# Patient Record
Sex: MALE | Race: WHITE | NOT HISPANIC OR LATINO | URBAN - METROPOLITAN AREA
[De-identification: names, ages, dates, MRNs, and addresses within clinical notes are randomized per-mention and may not be internally consistent; named-entity substitution may affect disease eponyms.]

---

## 2017-12-01 ENCOUNTER — EMERGENCY (EMERGENCY)
Facility: HOSPITAL | Age: 77
LOS: 0 days | Discharge: HOME | End: 2017-12-01

## 2017-12-01 DIAGNOSIS — R11.2 NAUSEA WITH VOMITING, UNSPECIFIED: ICD-10-CM

## 2017-12-01 DIAGNOSIS — R10.11 RIGHT UPPER QUADRANT PAIN: ICD-10-CM

## 2018-03-05 ENCOUNTER — RESULT REVIEW (OUTPATIENT)
Age: 78
End: 2018-03-05

## 2018-03-05 ENCOUNTER — OUTPATIENT (OUTPATIENT)
Dept: OUTPATIENT SERVICES | Facility: HOSPITAL | Age: 78
LOS: 1 days | Discharge: HOME | End: 2018-03-05

## 2018-03-05 VITALS
HEIGHT: 63 IN | RESPIRATION RATE: 20 BRPM | TEMPERATURE: 97 F | DIASTOLIC BLOOD PRESSURE: 73 MMHG | WEIGHT: 115.08 LBS | SYSTOLIC BLOOD PRESSURE: 139 MMHG | HEART RATE: 58 BPM

## 2018-03-05 VITALS — RESPIRATION RATE: 20 BRPM | DIASTOLIC BLOOD PRESSURE: 70 MMHG | HEART RATE: 60 BPM | SYSTOLIC BLOOD PRESSURE: 155 MMHG

## 2018-03-05 DIAGNOSIS — K26.9 DUODENAL ULCER, UNSPECIFIED AS ACUTE OR CHRONIC, WITHOUT HEMORRHAGE OR PERFORATION: ICD-10-CM

## 2018-03-05 DIAGNOSIS — D13.1 BENIGN NEOPLASM OF STOMACH: Chronic | ICD-10-CM

## 2018-03-05 DIAGNOSIS — Z96.611 PRESENCE OF RIGHT ARTIFICIAL SHOULDER JOINT: Chronic | ICD-10-CM

## 2018-03-06 LAB — SURGICAL PATHOLOGY STUDY: SIGNIFICANT CHANGE UP

## 2018-03-09 DIAGNOSIS — R10.13 EPIGASTRIC PAIN: ICD-10-CM

## 2018-03-09 DIAGNOSIS — K29.50 UNSPECIFIED CHRONIC GASTRITIS WITHOUT BLEEDING: ICD-10-CM

## 2018-03-09 DIAGNOSIS — K29.80 DUODENITIS WITHOUT BLEEDING: ICD-10-CM

## 2018-03-09 DIAGNOSIS — K26.9 DUODENAL ULCER, UNSPECIFIED AS ACUTE OR CHRONIC, WITHOUT HEMORRHAGE OR PERFORATION: ICD-10-CM

## 2018-03-09 DIAGNOSIS — K20.9 ESOPHAGITIS, UNSPECIFIED: ICD-10-CM

## 2018-03-09 DIAGNOSIS — K44.9 DIAPHRAGMATIC HERNIA WITHOUT OBSTRUCTION OR GANGRENE: ICD-10-CM

## 2018-03-26 ENCOUNTER — EMERGENCY (EMERGENCY)
Facility: HOSPITAL | Age: 78
LOS: 0 days | Discharge: HOME | End: 2018-03-27
Attending: EMERGENCY MEDICINE

## 2018-03-26 VITALS
OXYGEN SATURATION: 100 % | HEIGHT: 63 IN | HEART RATE: 71 BPM | RESPIRATION RATE: 20 BRPM | TEMPERATURE: 96 F | DIASTOLIC BLOOD PRESSURE: 86 MMHG | SYSTOLIC BLOOD PRESSURE: 153 MMHG | WEIGHT: 119.05 LBS
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
## 2018-03-26 DIAGNOSIS — S80.211A ABRASION, RIGHT KNEE, INITIAL ENCOUNTER: ICD-10-CM

## 2018-03-26 DIAGNOSIS — S60.512A ABRASION OF LEFT HAND, INITIAL ENCOUNTER: ICD-10-CM

## 2018-03-26 DIAGNOSIS — S80.212A ABRASION, LEFT KNEE, INITIAL ENCOUNTER: ICD-10-CM

## 2018-03-26 DIAGNOSIS — D13.1 BENIGN NEOPLASM OF STOMACH: Chronic | ICD-10-CM
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
## 2018-03-26 DIAGNOSIS — Z96.611 PRESENCE OF RIGHT ARTIFICIAL SHOULDER JOINT: Chronic | ICD-10-CM

## 2018-03-26 DIAGNOSIS — S62.336A DISPLACED FRACTURE OF NECK OF FIFTH METACARPAL BONE, RIGHT HAND, INITIAL ENCOUNTER FOR CLOSED FRACTURE: ICD-10-CM

## 2018-03-26 DIAGNOSIS — Z23 ENCOUNTER FOR IMMUNIZATION: ICD-10-CM

## 2018-03-26 DIAGNOSIS — S01.81XA LACERATION WITHOUT FOREIGN BODY OF OTHER PART OF HEAD, INITIAL ENCOUNTER: ICD-10-CM

## 2018-03-26 DIAGNOSIS — S00.31XA ABRASION OF NOSE, INITIAL ENCOUNTER: ICD-10-CM

## 2018-03-26 DIAGNOSIS — F32.9 MAJOR DEPRESSIVE DISORDER, SINGLE EPISODE, UNSPECIFIED: ICD-10-CM
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
## 2018-03-26 RX ORDER — AMANTADINE HCL 100 MG
0 CAPSULE ORAL
Qty: 0 | Refills: 0 | COMMUNITY

## 2018-03-26 RX ORDER — METOCLOPRAMIDE HCL 10 MG
0 TABLET ORAL
Qty: 0 | Refills: 0 | COMMUNITY

## 2018-03-26 RX ORDER — ONDANSETRON 8 MG/1
0 TABLET, FILM COATED ORAL
Qty: 0 | Refills: 0 | COMMUNITY

## 2018-03-26 RX ORDER — TETANUS TOXOID, REDUCED DIPHTHERIA TOXOID AND ACELLULAR PERTUSSIS VACCINE, ADSORBED 5; 2.5; 8; 8; 2.5 [IU]/.5ML; [IU]/.5ML; UG/.5ML; UG/.5ML; UG/.5ML
0.5 SUSPENSION INTRAMUSCULAR ONCE
Qty: 0 | Refills: 0 | Status: COMPLETED | OUTPATIENT
Start: 2018-03-26 | End: 2018-03-26

## 2018-03-26 NOTE — ED PROVIDER NOTE - OBJECTIVE STATEMENT
77 year old male with a PMHx of HIV presenting with forehead laceration after fall 1 hour ago. Pt was walking outside his house when he tripped and fell onto asphalt. Pt fell onto his hands/knees and hit his forehead on the ground. He denies any LOC, changes in vision, neck pain. Pt is not on any blood thinners 77 year old male with a PMHx of HIV presenting with forehead laceration after fall 1 hour ago. Pt was walking outside his house when he tripped and fell onto asphalt. Pt fell onto his hands/knees and hit his forehead on the ground. He was able to walk right after the event. He denies any LOC, changes in vision, neck pain. Pt is not on any blood thinners 77 year old male with a PMHx of HIV presenting with forehead laceration after fall 1 hour ago. Pt was walking outside his house when he tripped and fell onto asphalt. Pt fell onto his hands/knees and hit his forehead on the ground. He was able to walk right after the event. He denies any LOC, changes in vision, neck pain, paresthesias, lightheadedness. Pt is not on any blood thinners

## 2018-03-26 NOTE — ED PROVIDER NOTE - PHYSICAL EXAMINATION
CONST: Well appearing in NAD  EYES: PERRL, EOMI, Sclera and conjunctiva clear.  NECK: Non-tender cervical spine. Full ROM of cervical spine  CARD: Normal S1 S2; Normal rate and rhythm  RESP: Equal BS B/L, No wheezes, rhonchi or rales. No distress  MS: Normal ROM in all extremities. No patella tenderness. Full ROM of knees  SKIN: + 1.5 cm laceration medial to right eyebrow. + abrasion over nasal bridge. + abrasions over b/l patella.   NEURO: A&Ox3, No focal deficits. Cerebellar intact. No facial droop. No tongue deviation.

## 2018-03-26 NOTE — ED PROVIDER NOTE - NS ED ROS FT
CARD: No chest pain, palpitations  RESP: No SOB, cough, hemoptysis.   GI: No abdominal pain, N/V/D  MS: No joint pain, back pain or extremity pain/injury  SKIN: No rashes  NEURO: No headache, dizziness, paresthesias or LOC CARD: No chest pain, palpitations  RESP: No SOB, cough, hemoptysis.   GI: No abdominal pain, N/V/D  MS: denies neck/back pain  SKIN: + abrasions over b/l hands, nose, b/l knees  NEURO: No headache, dizziness, paresthesias or LOC

## 2018-03-26 NOTE — ED PROVIDER NOTE - CARE PLAN
Principal Discharge DX:	Laceration of face without complication  Secondary Diagnosis:	Boxers fracture

## 2018-03-26 NOTE — ED PROVIDER NOTE - ATTENDING CONTRIBUTION TO CARE
77yM pmhx as above,  not on antiplatelet or Anticoagulation p./w mechanical fall - [facility] -  slipped on wet  ground, fell prone -  no LOC  no vomiting  got up with help, ambulating  since fall. no headahce neck pain chest wall pain no sob abdominal pain +  pain to  right hand. PE alert nontoxic  laceration  to forehead,  ttp nasal bridge no septal hematoma  no c spine vertebral tenderness,  perrl eomi  cvs rrr resp cta chest wall b/l equal excursion nontender  abd soft nontender FROM all extremities  chronic deformity of right shoulder  NVI. ao x3 cn2-12intact,  Plan Ct, xray tetanus 77yM pmhx as above,  not on antiplatelet or Anticoagulation p./w mechanical fall - [facility] -  slipped on wet  ground, fell prone -  no LOC  no vomiting  got up with help, ambulating  since fall. no headahce neck pain chest wall pain no sob abdominal pain +  pain to  right hand. PE alert nontoxic  laceration  to forehead,  ttp nasal bridge no septal hematoma  no c spine vertebral tenderness,  perrl eomi  cvs rrr resp cta chest wall b/l equal excursion nontender  abd soft nontender FROM all extremities  chronic deformity of right shoulder ttp right 5th MCP  NVI. ao x3 cn2-12intact,  Plan Ct, xray tetanus

## 2018-03-26 NOTE — ED PROVIDER NOTE - PROGRESS NOTE DETAILS
Pt instructed to return to ED in 5 days for suture removal. I was directly involved in the care of this patient. PA Fellow Anita note/plan reviewed and agreed.

## 2018-03-26 NOTE — ED ADULT TRIAGE NOTE - CHIEF COMPLAINT QUOTE
BIBA. Patient fell outside. Patient has laceration to forehead and abrasion to chin and right hand. No LOC.

## 2018-03-27 VITALS
OXYGEN SATURATION: 99 % | HEART RATE: 61 BPM | SYSTOLIC BLOOD PRESSURE: 145 MMHG | DIASTOLIC BLOOD PRESSURE: 75 MMHG | TEMPERATURE: 97 F | RESPIRATION RATE: 20 BRPM

## 2018-03-27 RX ADMIN — TETANUS TOXOID, REDUCED DIPHTHERIA TOXOID AND ACELLULAR PERTUSSIS VACCINE, ADSORBED 0.5 MILLILITER(S): 5; 2.5; 8; 8; 2.5 SUSPENSION INTRAMUSCULAR at 00:15

## 2018-03-27 NOTE — ED PROCEDURE NOTE - ATTENDING CONTRIBUTION TO CARE
I was present for and supervised the key and critical aspects of the procedures performed during the care of the patient.
I was present for and supervised the key and critical aspects of the procedures performed during the care of the patient.

## 2018-04-03 ENCOUNTER — EMERGENCY (EMERGENCY)
Facility: HOSPITAL | Age: 78
LOS: 0 days | Discharge: HOME | End: 2018-04-03
Attending: EMERGENCY MEDICINE

## 2018-04-03 VITALS
OXYGEN SATURATION: 100 % | TEMPERATURE: 96 F | HEART RATE: 67 BPM | DIASTOLIC BLOOD PRESSURE: 69 MMHG | SYSTOLIC BLOOD PRESSURE: 126 MMHG | RESPIRATION RATE: 18 BRPM
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
## 2018-04-03 DIAGNOSIS — Z96.611 PRESENCE OF RIGHT ARTIFICIAL SHOULDER JOINT: Chronic | ICD-10-CM

## 2018-04-03 DIAGNOSIS — R20.2 PARESTHESIA OF SKIN: ICD-10-CM

## 2018-04-03 DIAGNOSIS — S01.81XD LACERATION WITHOUT FOREIGN BODY OF OTHER PART OF HEAD, SUBSEQUENT ENCOUNTER: ICD-10-CM
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
## 2018-04-03 DIAGNOSIS — Z79.899 OTHER LONG TERM (CURRENT) DRUG THERAPY: ICD-10-CM

## 2018-04-03 DIAGNOSIS — D13.1 BENIGN NEOPLASM OF STOMACH: Chronic | ICD-10-CM

## 2018-04-03 RX ORDER — ACETAMINOPHEN 500 MG
1000 TABLET ORAL ONCE
Qty: 0 | Refills: 0 | Status: COMPLETED | OUTPATIENT
Start: 2018-04-03 | End: 2018-04-03

## 2018-04-03 RX ADMIN — Medication 1000 MILLIGRAM(S): at 11:59

## 2018-04-03 NOTE — ED PROVIDER NOTE - NS ED ROS FT
Constitutional: See HPI.  Cardiac: No chest pain, SOB or edema. No chest pain with exertion.  Respiratory: No cough or respiratory distress.   MS: +numbness/tingling in right hand;   Neuro: No headache or weakness. No LOC.  Skin: +ecchymosis to right hand

## 2018-04-03 NOTE — ED PROVIDER NOTE - OBJECTIVE STATEMENT
76 y/o male with pmhx of HIV, stomach ulcers presents for suture removal. Patient was here 8 days ago s/p fall, sustained laceration to forehead and 5th metacarpal and phalanges fracture. 78 y/o male with pmhx of HIV, stomach ulcers presents for suture removal. Patient was here 8 days ago s/p fall, sustained laceration to forehead and right 5th metacarpal and phalanges fracture. Patient is also complaining of numbness/tingling to right sided numbness/tingling in the fingers that began on Sunday. Patient denies pain in hand, fevers/chills, SOB, chest pain.

## 2018-04-03 NOTE — ED ADULT NURSE NOTE - CHPI ED SYMPTOMS NEG
no vomiting/no nausea/no pain/no weakness/no decreased eating/drinking/no dizziness/no fever/no chills

## 2018-04-03 NOTE — ED PROVIDER NOTE - PROGRESS NOTE DETAILS
Advised patient to follow up with ortho today regarding symptoms in right hand. ATTENDING NOTE:   78 y/o M with PMH of HIV presents to ED for suture removal. Was seen last week s/p mechanical fall with forehead laceration that was repaired with sutures, also sustained right 5th digit fracture, placed in ulnar gutter splint, discharged to follow up with orthopedist (has appointment in 4 days). Pt additionally notes tingling to right second and third digits over the last 2 days. Denies new fall or trauma.   On exam: Pt is non-toxic, WA. Well healed wound to mid fore-head. Pt with no swelling inside of cast to R hand, good capillary refill, NVIT.   Sutures removed in ED. Plan to have pt go to hand clinic today for follow up.

## 2018-04-03 NOTE — ED ADULT NURSE NOTE - CAS TRG GEN SKIN CONDITION
soft splint to right hand had fx of finger fingers to right hand ecchymotic and more swollen then left c/o of having pins and needs to right hand soft cast does not appear tight/Warm

## 2018-04-03 NOTE — ED PROVIDER NOTE - PHYSICAL EXAMINATION
CONSTITUTIONAL: Well-developed; well-nourished; in no acute distress.   SKIN: warm, dry  HEAD: 1.5cm laceration to center forehead, well-healed, scab formed overlying sutures  EXT: Normal ROM to right hand. No edema noted to right hand  NEURO: decreased sensation to right 2nd and 3rd digits;   PSYCH: Cooperative, appropriate.

## 2018-05-08 ENCOUNTER — INPATIENT (INPATIENT)
Facility: HOSPITAL | Age: 78
LOS: 14 days | Discharge: HOME | End: 2018-05-23
Attending: STUDENT IN AN ORGANIZED HEALTH CARE EDUCATION/TRAINING PROGRAM | Admitting: STUDENT IN AN ORGANIZED HEALTH CARE EDUCATION/TRAINING PROGRAM
Payer: MEDICARE

## 2018-05-08 VITALS
SYSTOLIC BLOOD PRESSURE: 137 MMHG | OXYGEN SATURATION: 100 % | TEMPERATURE: 96 F | RESPIRATION RATE: 20 BRPM | DIASTOLIC BLOOD PRESSURE: 94 MMHG | HEART RATE: 59 BPM

## 2018-05-08 DIAGNOSIS — Z96.611 PRESENCE OF RIGHT ARTIFICIAL SHOULDER JOINT: Chronic | ICD-10-CM

## 2018-05-08 DIAGNOSIS — F32.9 MAJOR DEPRESSIVE DISORDER, SINGLE EPISODE, UNSPECIFIED: ICD-10-CM

## 2018-05-08 DIAGNOSIS — M50.00 CERVICAL DISC DISORDER WITH MYELOPATHY, UNSPECIFIED CERVICAL REGION: ICD-10-CM

## 2018-05-08 DIAGNOSIS — Z21 ASYMPTOMATIC HUMAN IMMUNODEFICIENCY VIRUS [HIV] INFECTION STATUS: ICD-10-CM

## 2018-05-08 DIAGNOSIS — M48.02 SPINAL STENOSIS, CERVICAL REGION: ICD-10-CM

## 2018-05-08 DIAGNOSIS — D13.1 BENIGN NEOPLASM OF STOMACH: Chronic | ICD-10-CM

## 2018-05-08 DIAGNOSIS — G95.20 UNSPECIFIED CORD COMPRESSION: ICD-10-CM

## 2018-05-08 DIAGNOSIS — M48.00 SPINAL STENOSIS, SITE UNSPECIFIED: ICD-10-CM

## 2018-05-08 DIAGNOSIS — Z96.611 PRESENCE OF RIGHT ARTIFICIAL SHOULDER JOINT: ICD-10-CM

## 2018-05-08 DIAGNOSIS — M50.10 CERVICAL DISC DISORDER WITH RADICULOPATHY, UNSPECIFIED CERVICAL REGION: ICD-10-CM

## 2018-05-08 DIAGNOSIS — Z87.891 PERSONAL HISTORY OF NICOTINE DEPENDENCE: ICD-10-CM

## 2018-05-08 DIAGNOSIS — R29.6 REPEATED FALLS: ICD-10-CM

## 2018-05-08 DIAGNOSIS — R26.81 UNSTEADINESS ON FEET: ICD-10-CM

## 2018-05-08 LAB
ALBUMIN SERPL ELPH-MCNC: 4.1 G/DL — SIGNIFICANT CHANGE UP (ref 3.5–5.2)
ALP SERPL-CCNC: 77 U/L — SIGNIFICANT CHANGE UP (ref 30–115)
ALT FLD-CCNC: 28 U/L — SIGNIFICANT CHANGE UP (ref 0–41)
ANION GAP SERPL CALC-SCNC: 13 MMOL/L — SIGNIFICANT CHANGE UP (ref 7–14)
APPEARANCE UR: (no result)
AST SERPL-CCNC: 31 U/L — SIGNIFICANT CHANGE UP (ref 0–41)
BACTERIA # UR AUTO: (no result) /HPF
BILIRUB DIRECT SERPL-MCNC: <0.2 MG/DL — SIGNIFICANT CHANGE UP (ref 0–0.2)
BILIRUB INDIRECT FLD-MCNC: >0.2 MG/DL — SIGNIFICANT CHANGE UP (ref 0.2–1.2)
BILIRUB SERPL-MCNC: 0.4 MG/DL — SIGNIFICANT CHANGE UP (ref 0.2–1.2)
BILIRUB UR-MCNC: NEGATIVE — SIGNIFICANT CHANGE UP
BUN SERPL-MCNC: 31 MG/DL — HIGH (ref 10–20)
CALCIUM SERPL-MCNC: 9.2 MG/DL — SIGNIFICANT CHANGE UP (ref 8.5–10.1)
CHLORIDE SERPL-SCNC: 105 MMOL/L — SIGNIFICANT CHANGE UP (ref 98–110)
CK MB CFR SERPL CALC: 6.9 NG/ML — HIGH (ref 0.6–6.3)
CK SERPL-CCNC: 215 U/L — SIGNIFICANT CHANGE UP (ref 0–225)
CO2 SERPL-SCNC: 22 MMOL/L — SIGNIFICANT CHANGE UP (ref 17–32)
COLOR SPEC: YELLOW — SIGNIFICANT CHANGE UP
CREAT SERPL-MCNC: 1.4 MG/DL — SIGNIFICANT CHANGE UP (ref 0.7–1.5)
DIFF PNL FLD: NEGATIVE — SIGNIFICANT CHANGE UP
GLUCOSE SERPL-MCNC: 100 MG/DL — HIGH (ref 70–99)
GLUCOSE UR QL: NEGATIVE MG/DL — SIGNIFICANT CHANGE UP
HCT VFR BLD CALC: 39.5 % — LOW (ref 42–52)
HGB BLD-MCNC: 13.5 G/DL — LOW (ref 14–18)
KETONES UR-MCNC: NEGATIVE — SIGNIFICANT CHANGE UP
LEUKOCYTE ESTERASE UR-ACNC: NEGATIVE — SIGNIFICANT CHANGE UP
MCHC RBC-ENTMCNC: 30.5 PG — SIGNIFICANT CHANGE UP (ref 27–31)
MCHC RBC-ENTMCNC: 34.2 G/DL — SIGNIFICANT CHANGE UP (ref 32–37)
MCV RBC AUTO: 89.2 FL — SIGNIFICANT CHANGE UP (ref 80–94)
NITRITE UR-MCNC: NEGATIVE — SIGNIFICANT CHANGE UP
NRBC # BLD: 0 /100 WBCS — SIGNIFICANT CHANGE UP (ref 0–0)
PH UR: 7.5 — SIGNIFICANT CHANGE UP (ref 5–8)
PLATELET # BLD AUTO: 139 K/UL — SIGNIFICANT CHANGE UP (ref 130–400)
POTASSIUM SERPL-MCNC: 4.9 MMOL/L — SIGNIFICANT CHANGE UP (ref 3.5–5)
POTASSIUM SERPL-SCNC: 4.9 MMOL/L — SIGNIFICANT CHANGE UP (ref 3.5–5)
PROT SERPL-MCNC: 6.7 G/DL — SIGNIFICANT CHANGE UP (ref 6–8)
PROT UR-MCNC: (no result) MG/DL
RBC # BLD: 4.43 M/UL — LOW (ref 4.7–6.1)
RBC # FLD: 14.2 % — SIGNIFICANT CHANGE UP (ref 11.5–14.5)
SODIUM SERPL-SCNC: 140 MMOL/L — SIGNIFICANT CHANGE UP (ref 135–146)
SP GR SPEC: 1.02 — SIGNIFICANT CHANGE UP (ref 1.01–1.03)
TROPONIN T SERPL-MCNC: <0.01 NG/ML — SIGNIFICANT CHANGE UP
UROBILINOGEN FLD QL: 0.2 MG/DL — SIGNIFICANT CHANGE UP (ref 0.2–0.2)
WBC # BLD: 7.02 K/UL — SIGNIFICANT CHANGE UP (ref 4.8–10.8)
WBC # FLD AUTO: 7.02 K/UL — SIGNIFICANT CHANGE UP (ref 4.8–10.8)

## 2018-05-08 RX ORDER — ONDANSETRON 8 MG/1
4 TABLET, FILM COATED ORAL EVERY 8 HOURS
Qty: 0 | Refills: 0 | Status: DISCONTINUED | OUTPATIENT
Start: 2018-05-08 | End: 2018-05-19

## 2018-05-08 RX ORDER — FLUOXETINE HCL 10 MG
40 CAPSULE ORAL DAILY
Qty: 0 | Refills: 0 | Status: DISCONTINUED | OUTPATIENT
Start: 2018-05-08 | End: 2018-05-19

## 2018-05-08 RX ORDER — EFAVIRENZ, EMTRICITABINE AND TENOFOVIR DISOPROXIL FUMARATE 600; 200; 300 MG/1; MG/1; MG/1
1 TABLET, FILM COATED ORAL AT BEDTIME
Qty: 0 | Refills: 0 | Status: DISCONTINUED | OUTPATIENT
Start: 2018-05-08 | End: 2018-05-19

## 2018-05-08 RX ORDER — PANTOPRAZOLE SODIUM 20 MG/1
40 TABLET, DELAYED RELEASE ORAL
Qty: 0 | Refills: 0 | Status: DISCONTINUED | OUTPATIENT
Start: 2018-05-08 | End: 2018-05-19

## 2018-05-08 RX ORDER — OXYCODONE AND ACETAMINOPHEN 5; 325 MG/1; MG/1
1 TABLET ORAL EVERY 6 HOURS
Qty: 0 | Refills: 0 | Status: DISCONTINUED | OUTPATIENT
Start: 2018-05-08 | End: 2018-05-08

## 2018-05-08 RX ORDER — SODIUM CHLORIDE 9 MG/ML
1000 INJECTION INTRAMUSCULAR; INTRAVENOUS; SUBCUTANEOUS
Qty: 0 | Refills: 0 | Status: DISCONTINUED | OUTPATIENT
Start: 2018-05-08 | End: 2018-05-10

## 2018-05-08 RX ORDER — METOCLOPRAMIDE HCL 10 MG
10 TABLET ORAL ONCE
Qty: 0 | Refills: 0 | Status: COMPLETED | OUTPATIENT
Start: 2018-05-08 | End: 2018-05-08

## 2018-05-08 RX ORDER — ENOXAPARIN SODIUM 100 MG/ML
30 INJECTION SUBCUTANEOUS EVERY 24 HOURS
Qty: 0 | Refills: 0 | Status: DISCONTINUED | OUTPATIENT
Start: 2018-05-08 | End: 2018-05-18

## 2018-05-08 RX ORDER — AMANTADINE HCL 100 MG
100 CAPSULE ORAL DAILY
Qty: 0 | Refills: 0 | Status: DISCONTINUED | OUTPATIENT
Start: 2018-05-08 | End: 2018-05-19

## 2018-05-08 RX ADMIN — ENOXAPARIN SODIUM 30 MILLIGRAM(S): 100 INJECTION SUBCUTANEOUS at 22:56

## 2018-05-08 RX ADMIN — Medication 100 MILLIGRAM(S): at 22:59

## 2018-05-08 RX ADMIN — Medication 40 MILLIGRAM(S): at 22:59

## 2018-05-08 RX ADMIN — Medication 10 MILLIGRAM(S): at 16:09

## 2018-05-08 RX ADMIN — EFAVIRENZ, EMTRICITABINE AND TENOFOVIR DISOPROXIL FUMARATE 1 TABLET(S): 600; 200; 300 TABLET, FILM COATED ORAL at 22:59

## 2018-05-08 RX ADMIN — SODIUM CHLORIDE 125 MILLILITER(S): 9 INJECTION INTRAMUSCULAR; INTRAVENOUS; SUBCUTANEOUS at 20:06

## 2018-05-08 RX ADMIN — SODIUM CHLORIDE 125 MILLILITER(S): 9 INJECTION INTRAMUSCULAR; INTRAVENOUS; SUBCUTANEOUS at 09:41

## 2018-05-08 RX ADMIN — OXYCODONE AND ACETAMINOPHEN 1 TABLET(S): 5; 325 TABLET ORAL at 18:06

## 2018-05-08 RX ADMIN — OXYCODONE AND ACETAMINOPHEN 1 TABLET(S): 5; 325 TABLET ORAL at 16:19

## 2018-05-08 NOTE — H&P ADULT - HISTORY OF PRESENT ILLNESS
78 y/o male with PMH of HIV on HAART and depression p/w the above complaint. yesterday patient had a mechanical fall and landed on his left shoulder. he hit his head but the was no LOC. No fever, n/v/d, CP, SOB, tingling, numbness or headache. patient is following with Dr. José Nunn for possible parkinson's disease. He was supposed to get MRI soon but he came to the ED.  Patient had been falling a lot lately secondary to ataxia. His hand tremors where getting worse for the last 2 months. otherwise he is at his baseline.    In patient was given 1 L of NS

## 2018-05-08 NOTE — H&P ADULT - NSHPPHYSICALEXAM_GEN_ALL_CORE
T(C): 35.8 (05-08-18 @ 08:14), Max: 35.8 (05-08-18 @ 08:14)  HR: 59 (05-08-18 @ 08:14) (59 - 59)  BP: 137/94 (05-08-18 @ 08:14) (137/94 - 137/94)  RR: 20 (05-08-18 @ 08:14) (20 - 20)  SpO2: 100% (05-08-18 @ 08:14) (100% - 100%)    PHYSICAL EXAM:  GENERAL: NAD, well-developed  HEAD:  Atraumatic, Normocephalic  EYES: EOMI, PERRLA, conjunctiva and sclera clear  ENT:No nasal obstruction or discharge. No tonsillar exudate, swelling or erythema.  NECK: Supple, No JVD  CHEST/LUNG: Clear to auscultation bilaterally; No wheeze  HEART: Regular rate and rhythm; No murmurs, rubs, or gallops  ABDOMEN: Soft, Nontender, Nondistended; Bowel sounds present  EXTREMITIES:  Right shoulder limited ROM, left arm in a sling  PSYCH: AAOx3  NEUROLOGY: non-focal  SKIN: No rashes or lesions

## 2018-05-08 NOTE — SBIRT NOTE. - NSSBIRTSERVICES_GEN_A_ED_FT
Provided SBIRT services:  Full Screen Positive. Brief Intervention Performed.    Screening results were reviewed with the patient and patient was provided information about healthy guidelines and potential negative consequences associated with level of risk. Motivation and readiness to reduce or stop use was discussed and goals and activities to make changes were suggested/offered.      AUDIT Score: 5  DAST-10 Score: 1  Duration = 15 Minutes     Naloxone Rescue Kit dispensed: Pt was educated about Naloxone and trained on how to assemble and utilize the kit.

## 2018-05-08 NOTE — ED PROVIDER NOTE - MEDICAL DECISION MAKING DETAILS
XRay shows a likely non displaced humeral head fracture, based on clinical exam.  CT shows no acute findings.  Given his frequent falls, inability to use a walker given his pre-existing right upper extremity disability and new left upper extremity disability, will admit.  Spoke with Dr. Gaviria for admission.  Neurology team at bedside.  MAR notified.

## 2018-05-08 NOTE — CONSULT NOTE ADULT - ASSESSMENT
Assessment:  This is a 77y Male with h/o falls who presents with a fall post putting on his shoes and falling.      Plan:   -follow up cxr, ct head, r shoulder xr  -c/w home medications  -monitor vitals    Pending attending attestation. Assessment:  This is a 77y Male with h/o falls who presents with a fall post putting on his shoes and falling.      Plan:   -follow up cxr, r shoulder xr  -c/w home medications  -monitor vitals    Pending attending attestation.
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
## 2018-05-08 NOTE — ED ADULT TRIAGE NOTE - CHIEF COMPLAINT QUOTE
Pt fell from standing yesterday, c/o L arm pain. Pt having frequent falls at home, unsteady gait. Pt denies head injury, no use of blood thinners. Pt aox3.

## 2018-05-08 NOTE — H&P ADULT - NSHPLABSRESULTS_GEN_ALL_CORE
13.5   7.02  )-----------( 139      ( 08 May 2018 09:29 )             39.5       05-08    140  |  105  |  31<H>  ----------------------------<  100<H>  4.9   |  22  |  1.4    Ca    9.2      08 May 2018 09:29    TPro  6.7  /  Alb  4.1  /  TBili  0.4  /  DBili  <0.2  /  AST  31  /  ALT  28  /  AlkPhos  77  05-08              Urinalysis Basic - ( 08 May 2018 11:53 )    Color: Yellow / Appearance: Cloudy / S.025 / pH: x  Gluc: x / Ketone: Negative  / Bili: Negative / Urobili: 0.2 mg/dL   Blood: x / Protein: Trace mg/dL / Nitrite: Negative   Leuk Esterase: Negative / RBC: x / WBC x   Sq Epi: x / Non Sq Epi: x / Bacteria: Moderate /HPF            CARDIAC MARKERS ( 08 May 2018 09:29 )  x     / <0.01 ng/mL / 215 U/L / x     / 6.9 ng/mL      Imaging:  Left shoulder and humerus xray  no evidence of fracture

## 2018-05-08 NOTE — ED PROVIDER NOTE - NS ED ROS FT
Constitutional: (-) fever  (-)chills  (-)sweats  Eyes/ENT: (-) blurry vision, (-) epistaxis  (-)rhinorrhea   (-) sore throat    Cardiovascular: (-) chest pain, (-) palpitations (-) edema   Respiratory: (-) cough, (-) shortness of breath   Gastrointestinal: (+)chronic nausea, (-) diarrhea  (-) abdominal pain   Musculoskeletal: (-) neck pain, (-) back pain, (+) left shoulder pain  Integumentary: (-) rash, (-) edema  Neurological: (-) headache, (-) altered mental status  (-)LOC  Psychiatric: (-) hallucinations  Allergic/Immunologic: (-) pruritus

## 2018-05-08 NOTE — ED ADULT NURSE NOTE - OBJECTIVE STATEMENT
s/p fall, increased unsteady gait over past few weeks, increased tremors. left arm pain from recent fall.

## 2018-05-08 NOTE — H&P ADULT - ASSESSMENT
78 y/o male with PMH of HIV on HAART and depression p/w frequent falls and imbalance.      #Frequent falls and imbalance  No evidence of fracture or brain bleed   pain control   PT and rehab    #Imbalance and tremors   r/o parkinson's disease VS HIV releated central etiologyy    - check CD4/CD8, VL  - MRI Brain w/w/o gado  - MRI Cervical w/w/o gado  - check ACE, B12, folate, Lyme, toxo Ag, SPEP, UPEP, serum immunofixation, anti-Hu ab, anti-Yo ab, anti-Tr ab, anti-Ri abs  -PT eval  - continue amantadine   -f/u neurology eval    #HIV  - check CD4/CD8, VL  -c/w home medications    #others  Regular diet  out of bed to chair  DVT and GI PPX  from home  Full code 76 y/o male with PMH of HIV on HAART and depression p/w frequent falls and imbalance.      #Frequent falls and imbalance  No evidence of fracture or brain bleed   pain control   PT and rehab    #Imbalance and tremors   r/o parkinson's disease VS HIV releated central etiologyy    - check CD4/CD8, VL  - MRI Brain w/w/o gado  - MRI Cervical w/w/o gado  - check ACE, B12, folate, Lyme, toxo Ag, SPEP, UPEP, serum immunofixation, anti-Hu ab, anti-Yo ab, anti-Tr ab, anti-Ri abs  -PT eval  - continue amantadine   -f/u neurology eval    #HIV  - check CD4/CD8, VL  -c/w home medications    pt with chronic nausea so will need anti-emetics prn    #others  Regular diet  out of bed to chair  DVT and GI PPX  from home  Full code

## 2018-05-08 NOTE — ED PROVIDER NOTE - CARE PLAN
Principal Discharge DX:	Frequent falls  Secondary Diagnosis:	Humerus head fracture, left, closed, initial encounter  Secondary Diagnosis:	HIV (human immunodeficiency virus infection)

## 2018-05-08 NOTE — ED PROVIDER NOTE - PHYSICAL EXAMINATION
Vital Signs: I have reviewed the initial vital signs.   Constitutional: WDWN in NAD.   Integumentary: No rash. MMM, good turgor  HEENT:  No conjunctival pallor or icterus.  PERRL, no nystagmus  NECK: Supple, non-tender, no JVD.  No midline tenderness. Cardiovascular: RRR, no murmur  Respiratory: CTA b/l, no wheeze or rales.   Gastrointestinal: Soft, NT/ND, no rebound or guarding.  Musculoskeletal: Left shoulder TTP at humeral head.  Pain with ROM.  No pelvic tenderness with rock.  Distal N/V normal.   Neurologic: AAOx3, motor strength 5/5 and sensation intact throughout upper and lowe ext, CN II-XII intact, No facial droop or slurring of speech. (+) tremor at rest.  (+) dysmetria.  (+) unsteady gait.
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
## 2018-05-09 LAB
% ALBUMIN: 60.2 % — SIGNIFICANT CHANGE UP
% ALPHA 1: 4.2 % — SIGNIFICANT CHANGE UP
% ALPHA 2: 9.4 % — SIGNIFICANT CHANGE UP
% BETA: 11 % — SIGNIFICANT CHANGE UP
% GAMMA: 15.2 % — SIGNIFICANT CHANGE UP
4/8 RATIO: 0.6 RATIO — LOW (ref 1.2–3.4)
ABS CD8: 708 /UL — HIGH (ref 206–494)
ALBUMIN SERPL ELPH-MCNC: 3.9 G/DL — SIGNIFICANT CHANGE UP (ref 3.6–5.5)
ALBUMIN/GLOB SERPL ELPH: 1.6 RATIO — SIGNIFICANT CHANGE UP
ALPHA1 GLOB SERPL ELPH-MCNC: 0.3 G/DL — SIGNIFICANT CHANGE UP (ref 0.1–0.4)
ALPHA2 GLOB SERPL ELPH-MCNC: 0.6 G/DL — SIGNIFICANT CHANGE UP (ref 0.5–1)
B BURGDOR C6 AB SER-ACNC: NEGATIVE — SIGNIFICANT CHANGE UP
B BURGDOR IGG+IGM SER-ACNC: <0.01 INDEX — SIGNIFICANT CHANGE UP (ref 0.01–0.89)
B-GLOBULIN SERPL ELPH-MCNC: 0.7 G/DL — SIGNIFICANT CHANGE UP (ref 0.5–1)
CD16+CD56+ CELLS NFR BLD: 10 % — SIGNIFICANT CHANGE UP (ref 4–20)
CD16+CD56+ CELLS NFR SPEC: 135 /UL — SIGNIFICANT CHANGE UP (ref 89–385)
CD19 BLASTS SPEC-ACNC: 2 % — LOW (ref 10–28)
CD19 BLASTS SPEC-ACNC: 28 /UL — SIGNIFICANT CHANGE UP (ref 72–502)
CD3 BLASTS SPEC-ACNC: 1158 /UL — SIGNIFICANT CHANGE UP (ref 800–2012)
CD3 BLASTS SPEC-ACNC: 86 % — HIGH (ref 59–81)
CD4 %: 33 % — SIGNIFICANT CHANGE UP (ref 42–58)
CD8 %: 53 % — SIGNIFICANT CHANGE UP (ref 14–30)
FOLATE SERPL-MCNC: >20 NG/ML — SIGNIFICANT CHANGE UP
GAMMA GLOBULIN: 1 G/DL — SIGNIFICANT CHANGE UP (ref 0.6–1.6)
INTERPRETATION SERPL IFE-IMP: SIGNIFICANT CHANGE UP
PROT PATTERN SERPL ELPH-IMP: SIGNIFICANT CHANGE UP
PROT SERPL-MCNC: 6.4 G/DL — SIGNIFICANT CHANGE UP (ref 6–8.3)
PROT SERPL-MCNC: 6.4 G/DL — SIGNIFICANT CHANGE UP (ref 6–8.3)
T GONDII IGM SER QL: <3 AU/ML — SIGNIFICANT CHANGE UP
T GONDII IGM SER QL: NEGATIVE — SIGNIFICANT CHANGE UP
T-CELL CD4 SUBSET PNL BLD: 445 /UL — LOW (ref 495–1312)
VIT B12 SERPL-MCNC: 990 PG/ML — SIGNIFICANT CHANGE UP (ref 232–1245)

## 2018-05-09 RX ADMIN — EFAVIRENZ, EMTRICITABINE AND TENOFOVIR DISOPROXIL FUMARATE 1 TABLET(S): 600; 200; 300 TABLET, FILM COATED ORAL at 22:37

## 2018-05-09 RX ADMIN — PANTOPRAZOLE SODIUM 40 MILLIGRAM(S): 20 TABLET, DELAYED RELEASE ORAL at 05:29

## 2018-05-09 RX ADMIN — Medication 40 MILLIGRAM(S): at 12:27

## 2018-05-09 RX ADMIN — SODIUM CHLORIDE 125 MILLILITER(S): 9 INJECTION INTRAMUSCULAR; INTRAVENOUS; SUBCUTANEOUS at 04:10

## 2018-05-09 RX ADMIN — Medication 100 MILLIGRAM(S): at 12:27

## 2018-05-09 RX ADMIN — ENOXAPARIN SODIUM 30 MILLIGRAM(S): 100 INJECTION SUBCUTANEOUS at 22:37

## 2018-05-09 NOTE — ED ADULT NURSE REASSESSMENT NOTE - NS ED NURSE REASSESS COMMENT FT1
report taken from MADDIE Larson. pt stable. awaiting a bed. no needs at this time.
Pt assessed, alert and oriented at this time. Pt s/p fall, pain scale 6/10 at this time, resting comfortably a this time. B/L lung sounds clear. Pt noted with tremors. Pending MRI of spine and head. IV fluids in progress. Will continue to monitor.

## 2018-05-09 NOTE — CONSULT NOTE ADULT - SUBJECTIVE AND OBJECTIVE BOX
Patient is a 77y old  Male who presents with a chief complaint of frequent Falls and imbalance (08 May 2018 13:49)    HPI:  78 y/o male with PMH of HIV on HAART and depression p/w the above complaint. yesterday patient had a mechanical fall and landed on his left shoulder. he hit his head but the was no LOC. No fever, n/v/d, CP, SOB, tingling, numbness or headache. patient is following with Dr. José Nunn for possible parkinson's disease. He was supposed to get MRI soon but he came to the ED.  Patient had been falling a lot lately secondary to ataxia. His hand tremors where getting worse for the last 2 months. otherwise he is at his baseline.    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      Plan:  - check CD4/CD8, VL  - MRI Brain w/w/o gado  - MRI Cervical w/w/o gado  - check ACE, B12, folate, Lyme, toxo Ag, SPEP, UPEP, serum immunofixation, anti-Hu ab, anti-Yo ab, anti-Tr ab, anti-Ri abs  -PT eval    TODAY'S SUBJECTIVE & REVIEW OF SYMPTOMS:     Constitutional WNL   Cardio WNL   Resp WNL   GI WNL  Heme WNL  Endo WNL  Skin WNL  MSK WNL  Neuro paresthesias feet  Cognitive WNL  Psych WNL  occ urinary retention dejuan when constipated    MEDICATIONS  (STANDING):  amantadine 100 milliGRAM(s) Oral daily  efavirenz 600/emtricitabine 200/tenofovir 300mg 1 Tablet(s) Oral at bedtime  enoxaparin Injectable 30 milliGRAM(s) SubCutaneous every 24 hours  FLUoxetine 40 milliGRAM(s) Oral daily  pantoprazole    Tablet 40 milliGRAM(s) Oral before breakfast  sodium chloride 0.9%. 1000 milliLiter(s) (125 mL/Hr) IV Continuous <Continuous>    MEDICATIONS  (PRN):  ondansetron    Tablet 4 milliGRAM(s) Oral every 8 hours PRN Nausea and/or Vomiting  oxyCODONE    5 mG/acetaminophen 325 mG 1 Tablet(s) Oral every 6 hours PRN Severe Pain (7 - 10)      FAMILY HISTORY:      Allergies    No Known Allergies    Intolerances        SOCIAL HISTORY:    [    ] Etoh  [    ] Smoking  [    ] Substance abuse     Home Environment:  [  x  ] Home Alone  [    ] Lives with Family  [    ] Home Health Aid    Dwelling:  [    ] Apartment  [  x  ] Private House  [    ] Adult Home  [    ] Skilled Nursing Facility      [    ] Short Term  [    ] Long Term  [ x   ] Stairs                           [    ] Elevator     FUNCTIONAL STATUS PTA: (Check all that apply)  Ambulation: [   x  ]Independent    [    ] Dependent     [    ] Non-Ambulatory  Assistive Device: [    ] SA Cane  [    ]  Q Cane  [    ] Walker  [    ]  Wheelchair  ADL : [   x ] Independent  [    ]  Dependent       Vital Signs Last 24 Hrs  T(C): 36.4 (09 May 2018 08:10), Max: 36.6 (09 May 2018 00:39)  T(F): 97.6 (09 May 2018 08:10), Max: 97.9 (09 May 2018 00:39)  HR: 56 (09 May 2018 08:10) (50 - 56)  BP: 155/70 (09 May 2018 08:10) (140/68 - 155/70)  BP(mean): --  RR: 18 (09 May 2018 08:10) (18 - 18)  SpO2: 98% (09 May 2018 08:10) (98% - 99%)      PHYSICAL EXAM: Alert & Oriented X3  GENERAL: NAD, well-groomed, well-developed  HEAD:  Atraumatic, Normocephalic  EYES: EOMI, PERRLA, conjunctiva and sclera clear  NECK: Supple, No JVD, Normal thyroid  CHEST/LUNG: Clear to percussion bilaterally; No rales, rhonchi, wheezing, or rubs  HEART: Regular rate and rhythm; No murmurs, rubs, or gallops  ABDOMEN: Soft, Nontender, Nondistended; Bowel sounds present  EXTREMITIES:  2+ Peripheral Pulses, No clubbing, cyanosis, or edema    NERVOUS SYSTEM:  Cranial Nerves 2-12 intact [  x  ] Abnormal  [    ]  tremors RUE  ROM: WFL all extremities [  x  ]  Abnormal [     ]  Motor Strength: WFL all extremities  [  x  ]  Abnormal [    ]  Sensation: intact to light touch [x    ] Abnormal [    ]  Reflexes: Symmetric [    ]  Abnormal [  x  ]Brisk All ext  +hoffmans, Uriel Plantars Downgoing  FUNCTIONAL STATUS:  Bed Mobility: [   ]  Independent [    ]  Supervision [  x  ]  Needs Assistance [  ]  N/A  Transfers: [    ]  Independent [    ]  Supervision [  x  ]  Needs Assistance [    ]  N/A    Ambulation:  [    ]  Independent [    ]  Supervision [   x ]  Needs Assistance [    ]  N/A   ADL:  [    ]   Independent [  x  ] Requires Assistance [    ] N/A       LABS:                        13.5   7.02  )-----------( 139      ( 08 May 2018 09:29 )             39.5     -08    140  |  105  |  31<H>  ----------------------------<  100<H>  4.9   |  22  |  1.4    Ca    9.2      08 May 2018 09:29    TPro  6.4  /  Alb  x   /  TBili  x   /  DBili  x   /  AST  x   /  ALT  x   /  AlkPhos  x         Urinalysis Basic - ( 08 May 2018 11:53 )    Color: Yellow / Appearance: Cloudy / S.025 / pH: x  Gluc: x / Ketone: Negative  / Bili: Negative / Urobili: 0.2 mg/dL   Blood: x / Protein: Trace mg/dL / Nitrite: Negative   Leuk Esterase: Negative / RBC: x / WBC x   Sq Epi: x / Non Sq Epi: x / Bacteria: Moderate /HPF        RADIOLOGY & ADDITIONAL STUDIES:

## 2018-05-09 NOTE — PROGRESS NOTE ADULT - SUBJECTIVE AND OBJECTIVE BOX
TOMY ESPINOSA 77y Male   MRN#: [id number]    Patient is a 77y old Male who presents with a chief complaint of frequent Falls and imbalance. Currently admitted to medicine with the primary diagnosis of frequent falls, rule out insidious causes. Today is hospital day 1d. This morning he is resting comfortably in bed and reports no new issues or overnight events.     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 1000 milliLiter(s) IV Continuous <Continuous>    PRN MEDICATIONS  ondansetron    Tablet 4 milliGRAM(s) Oral every 8 hours PRN  oxyCODONE    5 mG/acetaminophen 325 mG 1 Tablet(s) Oral every 6 hours PRN      VITALS:   T(F): 97.6  HR: 56  BP: 155/70  RR: 18  SpO2: 98%    LABS:                        13.5   7.02  )-----------( 139      ( 08 May 2018 09:29 )             39.5     05-08    140  |  105  |  31<H>  ----------------------------<  100<H>  4.9   |  22  |  1.4    Ca    9.2      08 May 2018 09:29    TPro  6.4  /  Alb  x   /  TBili  x   /  DBili  x   /  AST  x   /  ALT  x   /  AlkPhos  x   -      Urinalysis Basic - ( 08 May 2018 11:53 )    Color: Yellow / Appearance: Cloudy / S.025 / pH: x  Gluc: x / Ketone: Negative  / Bili: Negative / Urobili: 0.2 mg/dL   Blood: x / Protein: Trace mg/dL / Nitrite: Negative   Leuk Esterase: Negative / RBC: x / WBC x   Sq Epi: x / Non Sq Epi: x / Bacteria: Moderate /HPF      CARDIAC MARKERS ( 08 May 2018 09:29 )  x     / <0.01 ng/mL / 215 U/L / x     / 6.9 ng/mL      RADIOLOGY:  MRI Brain and C-Spine W&WO Reza Pending      PHYSICAL EXAM:    GENERAL: NAD, well-developed, AAOx3  HEENT:  Atraumatic, Normocephalic. EOMI, PERRLA, conjunctiva and sclera clear, No JVD  PULMONARY: Clear to auscultation bilaterally; No wheeze  CARDIOVASCULAR: Regular rate and rhythm; No murmurs, rubs, or gallops  GASTROINTESTINAL: Soft, Nontender, Nondistended; Bowel sounds present  MUSCULOSKELETAL:  2+ Peripheral Pulses, No clubbing, cyanosis, or edema  NEUROLOGY: non-focal  SKIN: No rashes or lesions

## 2018-05-09 NOTE — CONSULT NOTE ADULT - ASSESSMENT
IMPRESSION: Rehab of gait Ab R/O cental neuro process/?cervical myelopathy    PRECAUTIONS: [    ] Cardiac  [    ] Respiratory  [    ] Seizures [    ] Contact Isolation  [    ] Droplet Isolation  [    ] Other    Weight Bearing Status:     RECOMMENDATION:    Out of Bed to Chair     DVT/Decubiti Prophylaxis    REHAB PLAN:     [  x   ] Bedside P/T 3-5 times a week   [     ] Bedside O/T  2-3 times a week   [     ] No Rehab Therapy Indicated   [     ]  Speech Therapy   Conditioning/ROM                                 ADL  Bed Mobility                                            Conditioning/ROM  Transfers                                                  Bed Mobility  Sitting /Standing Balance                      Transfers                                        Gait Training                                            Sitting/Standing Balance  Stair Training [   ]Applicable                 Home equipment Eval                                                                     Splinting  [   ] Only      GOALS:   ADL   [ x   ]   Independent         Transfers  [  x  ] Independent            Ambulation  [   x  ] Independent     [   x  ] With device                            [    ]  CG                                               [    ]  CG                                                    [     ] CG                            [    ] Min A                                          [    ] Min A                                                [     ] Min  A          DISCHARGE PLAN:   [     ]  Good candidate for Intensive Rehabilitation/Hospital based-[facility]A [facility]                                             Will tolerate 3hrs Intensive Rehab Daily                                       [      ]  Short Term Rehab in Skilled Nursing Facility                                       [      ]  Home with Outpatient or VN services                                         [   x   ]  Possible Candidate for Intensive Hospital based Rehab

## 2018-05-10 ENCOUNTER — TRANSCRIPTION ENCOUNTER (OUTPATIENT)
Age: 78
End: 2018-05-10

## 2018-05-10 LAB
4/8 RATIO: 0.8 RATIO — LOW (ref 1.2–3.4)
ABS CD8: 537 /UL — HIGH (ref 206–494)
ACE SERPL-CCNC: 30 U/L — SIGNIFICANT CHANGE UP (ref 14–82)
CD16+CD56+ CELLS NFR BLD: 7 % — SIGNIFICANT CHANGE UP (ref 4–20)
CD16+CD56+ CELLS NFR SPEC: 78 /UL — SIGNIFICANT CHANGE UP (ref 89–385)
CD19 BLASTS SPEC-ACNC: 1 % — LOW (ref 10–28)
CD19 BLASTS SPEC-ACNC: 12 /UL — SIGNIFICANT CHANGE UP (ref 72–502)
CD3 BLASTS SPEC-ACNC: 90 % — HIGH (ref 59–81)
CD3 BLASTS SPEC-ACNC: 955 /UL — SIGNIFICANT CHANGE UP (ref 800–2012)
CD4 %: 40 % — SIGNIFICANT CHANGE UP (ref 42–58)
CD8 %: 51 % — SIGNIFICANT CHANGE UP (ref 14–30)
RIBOSOMAL P AB SER-ACNC: <0.2 AI — SIGNIFICANT CHANGE UP
T-CELL CD4 SUBSET PNL BLD: 419 /UL — LOW (ref 495–1312)

## 2018-05-10 PROCEDURE — 99223 1ST HOSP IP/OBS HIGH 75: CPT

## 2018-05-10 RX ORDER — FLUOXETINE HCL 10 MG
0 CAPSULE ORAL
Qty: 0 | Refills: 0 | COMMUNITY

## 2018-05-10 RX ORDER — ONDANSETRON 8 MG/1
4 TABLET, FILM COATED ORAL EVERY 6 HOURS
Qty: 0 | Refills: 0 | Status: DISCONTINUED | OUTPATIENT
Start: 2018-05-10 | End: 2018-05-10

## 2018-05-10 RX ADMIN — ENOXAPARIN SODIUM 30 MILLIGRAM(S): 100 INJECTION SUBCUTANEOUS at 22:35

## 2018-05-10 RX ADMIN — SODIUM CHLORIDE 125 MILLILITER(S): 9 INJECTION INTRAMUSCULAR; INTRAVENOUS; SUBCUTANEOUS at 12:23

## 2018-05-10 RX ADMIN — Medication 100 MILLIGRAM(S): at 12:22

## 2018-05-10 RX ADMIN — Medication 40 MILLIGRAM(S): at 12:22

## 2018-05-10 RX ADMIN — PANTOPRAZOLE SODIUM 40 MILLIGRAM(S): 20 TABLET, DELAYED RELEASE ORAL at 05:51

## 2018-05-10 RX ADMIN — EFAVIRENZ, EMTRICITABINE AND TENOFOVIR DISOPROXIL FUMARATE 1 TABLET(S): 600; 200; 300 TABLET, FILM COATED ORAL at 22:35

## 2018-05-10 NOTE — PROGRESS NOTE ADULT - SUBJECTIVE AND OBJECTIVE BOX
Name: TOMY ESPINOSA  Age: 77y  Gender: Male    Pt was seen and examined.   c/o: about same, no changes    Allergies:  No Known Allergies      PHYSICAL EXAM:    Vitals:  T(C): 36.2 (05-10-18 @ 16:43), Max: 36.2 (05-10-18 @ 16:43)  HR: 54 (05-10-18 @ 16:43) (52 - 54)  BP: 148/77 (05-10-18 @ 16:43) (148/77 - 153/87)  RR: 18 (05-10-18 @ 16:43) (18 - 20)  SpO2: 98% (05-10-18 @ 16:43) (98% - 100%)  Wt(kg): --Vital Signs Last 24 Hrs  T(C): 36.2 (10 May 2018 16:43), Max: 36.2 (10 May 2018 16:43)  T(F): 97.1 (10 May 2018 16:43), Max: 97.1 (10 May 2018 16:43)  HR: 54 (10 May 2018 16:43) (52 - 54)  BP: 148/77 (10 May 2018 16:43) (148/77 - 153/87)  BP(mean): --  RR: 18 (10 May 2018 16:43) (18 - 20)  SpO2: 98% (10 May 2018 16:43) (98% - 100%)      NECK: Supple, No JVD  CHEST/LUNG: CTA, B/L, No rales, rhonchi, wheezing  HEART: S1,S2, N1 Regular rate and rhythm; No murmurs, rubs, or gallops  ABDOMEN: Soft, Nontender, Nondistended; Bowel sounds present  EXTREMITIES:  2+ Peripheral Pulses, No clubbing, cyanosis, or edema      MEDICATIONS  (STANDING):  amantadine 100 milliGRAM(s) Oral daily  efavirenz 600/emtricitabine 200/tenofovir 300mg 1 Tablet(s) Oral at bedtime  enoxaparin Injectable 30 milliGRAM(s) SubCutaneous every 24 hours  FLUoxetine 40 milliGRAM(s) Oral daily  pantoprazole    Tablet 40 milliGRAM(s) Oral before breakfast        RADIOLOGY & ADDITIONAL TESTS:    Imaging Personally Reviewed:  [ ] YES  [ ] NO    Assessment and Plan:    Assessment:  · Assessment		  76 y/o male with PMH of HIV on HAART and depression p/w frequent falls and imbalance.      #Frequent falls and imbalance/ Ataxia    likely secondary to severe c-spine stenosis  will consult neurosurgery, dr. Goins in am      #Imbalance and tremors   r/o parkinson's disease VS HIV releated central etiologyy    - check CD4/CD8, VL  - MRI Brain w/w/o gado  - MRI Cervical w/w/o gado  - check ACE, B12, folate, Lyme, toxo Ag, SPEP, UPEP, serum immunofixation, anti-Hu ab, anti-Yo ab, anti-Tr ab, anti-Ri abs  -PT eval  - continue amantadine   -f/u neurology eval    #HIV  - check CD4/CD8, VL  -c/w home medications    pt with chronic nausea so will need anti-emetics prn    PLAN - PER NEUROSURGERY             HE HAS FAILED  CONSERVATIVE MEDICAL TREATMENT

## 2018-05-10 NOTE — PROGRESS NOTE ADULT - SUBJECTIVE AND OBJECTIVE BOX
TOMY ESPINOSA 77y Male   MRN#: [id number]    Patient is a 77y old Male who presents with a chief complaint of frequent Falls and imbalance. Currently admitted to medicine with the primary diagnosis of cervical spinal stenosis. Today is hospital day 1d. This morning he is resting at bedside and reports stable condition.     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 1000 milliLiter(s) IV Continuous <Continuous>    PRN MEDICATIONS  ondansetron    Tablet 4 milliGRAM(s) Oral every 8 hours PRN  oxyCODONE    5 mG/acetaminophen 325 mG 1 Tablet(s) Oral every 6 hours PRN      VITALS:   T(F): 96.7  HR: 52  BP: 153/87  RR: 20  SpO2: 100%    LABS:    TPro  6.4  /  Alb  3.9  /  TBili  x   /  DBili  x   /  AST  x   /  ALT  x   /  AlkPhos  x         Urinalysis Basic - ( 08 May 2018 11:53 )    Color: Yellow / Appearance: Cloudy / S.025 / pH: x  Gluc: x / Ketone: Negative  / Bili: Negative / Urobili: 0.2 mg/dL   Blood: x / Protein: Trace mg/dL / Nitrite: Negative   Leuk Esterase: Negative / RBC: x / WBC x   Sq Epi: x / Non Sq Epi: x / Bacteria: Moderate /HPF    RADIOLOGY:  MR Head w/wo IV Cont (18 @ 11:00)  No evidence of acute intracranial pathology. Mild parenchymal volume loss and chronic microvascular ischemic changes.    MR Cervical Spine w/wo IV Cont (18 @ 11:00)  1. Significantly limited study secondary to motion artifact.  2. Cord compression and severe spinal canal stenosis C3-C4, C4-C5 and C5-C6 secondary to chronic degenerative changes. Evaluation of cord   signal is limited.      PHYSICAL EXAM:    GENERAL: NAD, thin, AAOx3, resting tremor, flight of ideas  HEENT:  Atraumatic, Normocephalic. EOMI, PERRLA, conjunctiva and sclera clear, No JVD  PULMONARY: Clear to auscultation bilaterally; No wheeze  CARDIOVASCULAR: Regular rate and rhythm; No murmurs, rubs, or gallops  GASTROINTESTINAL: Soft, Nontender, Nondistended; Bowel sounds present  MUSCULOSKELETAL:  2+ Peripheral Pulses, No clubbing, cyanosis, or edema  NEUROLOGY: non-focal, unsteady gate  SKIN: No rashes or lesions

## 2018-05-10 NOTE — PHYSICAL THERAPY INITIAL EVALUATION ADULT - ADDITIONAL COMMENTS
Pt report he has been having tremors of R UE and R LE ~ 1 yr, has difficulty putting socks on, has had frequent falls past 6 weeks.

## 2018-05-10 NOTE — DISCHARGE NOTE ADULT - MEDICATION SUMMARY - MEDICATIONS TO TAKE
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

## 2018-05-10 NOTE — PROGRESS NOTE ADULT - SUBJECTIVE AND OBJECTIVE BOX
Name: TOMY ESPINOSA  Age: 77y  Gender: Male    Pt was seen and examined.   c/o:    Allergies:  No Known Allergies      PHYSICAL EXAM:    Vitals:  T(C): 35.8 (05-09-18 @ 17:58), Max: 36.6 (05-09-18 @ 00:39)  HR: 55 (05-09-18 @ 17:58) (50 - 56)  BP: 136/67 (05-09-18 @ 17:58) (136/67 - 155/70)  RR: 18 (05-09-18 @ 17:58) (18 - 18)  SpO2: 98% (05-09-18 @ 17:58) (98% - 99%)  Wt(kg): --Vital Signs Last 24 Hrs  T(C): 35.8 (09 May 2018 17:58), Max: 36.6 (09 May 2018 00:39)  T(F): 96.5 (09 May 2018 17:58), Max: 97.9 (09 May 2018 00:39)  HR: 55 (09 May 2018 17:58) (50 - 56)  BP: 136/67 (09 May 2018 17:58) (136/67 - 155/70)  BP(mean): --  RR: 18 (09 May 2018 17:58) (18 - 18)  SpO2: 98% (09 May 2018 17:58) (98% - 99%)      NECK: Supple, No JVD  CHEST/LUNG: CTA, B/L, No rales, rhonchi, wheezing, or rubs  HEART: S1,S2, N1 Regular rate and rhythm; No murmurs, rubs, or gallops  ABDOMEN: Soft, Nontender, Nondistended; Bowel sounds present  EXTREMITIES:  2+ Peripheral Pulses, No clubbing, cyanosis, or edema      LABS:                        13.5   7.02  )-----------( 139      ( 08 May 2018 09:29 )             39.5     05-08    140  |  105  |  31<H>  ----------------------------<  100<H>  4.9   |  22  |  1.4    Ca    9.2      08 May 2018 09:29    TPro  6.4  /  Alb  3.9  /  TBili  x   /  DBili  x   /  AST  x   /  ALT  x   /  AlkPhos  x   05-08    LIVER FUNCTIONS - ( 08 May 2018 18:10 )  Alb: 3.9 g/dL / Pro: 6.4 g/dL / ALK PHOS: x     / ALT: x     / AST: x     / GGT: x           CARDIAC MARKERS ( 08 May 2018 09:29 )  x     / <0.01 ng/mL / 215 U/L / x     / 6.9 ng/mL        MEDICATIONS  (STANDING):  amantadine 100 milliGRAM(s) Oral daily  efavirenz 600/emtricitabine 200/tenofovir 300mg 1 Tablet(s) Oral at bedtime  enoxaparin Injectable 30 milliGRAM(s) SubCutaneous every 24 hours  FLUoxetine 40 milliGRAM(s) Oral daily  pantoprazole    Tablet 40 milliGRAM(s) Oral before breakfast  sodium chloride 0.9%. 1000 milliLiter(s) (125 mL/Hr) IV Continuous <Continuous>        RADIOLOGY & ADDITIONAL TESTS:    Imaging Personally Reviewed:  [ ] YES  [ ] NO    Assessment and Plan:    Assessment:  · Assessment		  78 y/o male with PMH of HIV on HAART and depression p/w frequent falls and imbalance.      #Frequent falls and imbalance/ Ataxia    likely secondary to severe c-spine stenosis  will consult neurosurgery, dr. Goins in am      #Imbalance and tremors   r/o parkinson's disease VS HIV releated central etiologyy    - check CD4/CD8, VL  - MRI Brain w/w/o gado  - MRI Cervical w/w/o gado  - check ACE, B12, folate, Lyme, toxo Ag, SPEP, UPEP, serum immunofixation, anti-Hu ab, anti-Yo ab, anti-Tr ab, anti-Ri abs  -PT eval  - continue amantadine   -f/u neurology eval    #HIV  - check CD4/CD8, VL  -c/w home medications    pt with chronic nausea so will need anti-emetics prn    #others  Regular diet  out of bed to chair  DVT and GI PPX  from home  Full code

## 2018-05-10 NOTE — CONSULT NOTE ADULT - SUBJECTIVE AND OBJECTIVE BOX
GRICELTOMY HARDY  MRN-[id number]    77yMale Patient is a 77y old  Male who presents with a chief complaint of frequent Falls and imbalance (08 May 2018 13:49)        Current issues:    Patient states he has noticed feeling off balance for last 2-4 weeks. having difficult getting OOB and walking.   no incontinence   Hx of right should replacement.    +HIV from blood transfusion years ago  noticed tremor x 1 year    Exam:  Vital Signs Last 24 Hrs  T(C): 35.9 (10 May 2018 07:18), Max: 35.9 (10 May 2018 07:18)  T(F): 96.7 (10 May 2018 07:18), Max: 96.7 (10 May 2018 07:18)  HR: 52 (10 May 2018 07:18) (52 - 55)  BP: 153/87 (10 May 2018 07:18) (136/67 - 153/87)  BP(mean): --  RR: 20 (10 May 2018 07:18) (18 - 20)  SpO2: 100% (10 May 2018 07:18) (98% - 100%)    alert ox3  no neck tenderness  arms + hoffmans b/l  right triceps slightly weak 4+/5 only  legs good strenght bilat        Plan:  < from: MR Cervical Spine w/wo IV Cont (05.09.18 @ 11:00) >    EXAM:  MR SPINE CERVICAL WAW IC            PROCEDURE DATE:  05/09/2018  Impression:    1. Significantly limited study secondary to motion artifact.    2. Cord compression and severe spinal canal stenosis C3-C4, C4-C5 and   C5-C6 secondary to chronic degenerative changes. Evaluation of cord   signal is limited.    < end of copied text >    patient was seen and examined by dr staton ,  she recommended surgical treatment options and Patient will think them over  Preop medical clearance needed if he wishes to go to the OR.      Allergies    No Known Allergies    Intolerances      Home Medications:  amantadine 100 mg oral tablet:  (26 Mar 2018 23:13)  Atripla oral tablet: 1 tab(s) orally once a day (at bedtime) (26 Mar 2018 23:13)  FLUoxetine: 40 mg (26 Mar 2018 23:13)  FLUoxetine 40 mg oral capsule: 1 cap(s) orally once a day (26 Mar 2018 23:13)  metoclopramide 10 mg oral tablet:  (26 Mar 2018 23:13)  omeprazole 40 mg oral delayed release capsule: 1 cap(s) orally once a day (26 Mar 2018 23:13)  ondansetron 4 mg oral tablet:  (26 Mar 2018 23:13)    PAST MEDICAL & SURGICAL HISTORY:  Nausea & vomiting  Depression  HIV disease  H/O total shoulder replacement, right  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 1000 milliLiter(s) (125 mL/Hr) IV Continuous <Continuous>    MEDICATIONS  (PRN):  ondansetron    Tablet 4 milliGRAM(s) Oral every 8 hours PRN Nausea and/or Vomiting  oxyCODONE    5 mG/acetaminophen 325 mG 1 Tablet(s) Oral every 6 hours PRN Severe Pain (7 - 10)      I&O's Summary

## 2018-05-10 NOTE — CONSULT NOTE ADULT - ATTENDING COMMENTS
Pt with recent falls, decreased balance. On exam 4/5 all ext except right triceps 2/5 (?chronic), right shoulder frozen chronic. +blackmon's bilaterally. MRI cervical spine unable to ass cord itself secondary to severe motion degradation. Severe cervical stenosis with cord compression noted from C3-C7. Pt with clinical signs of myelopathy, multiple falls. Discussed with pt need for posterior cervical decompression and instrumented fusion. Pt reticent at this time. Discussed with patient that he at significant risk for continuous falls and could therefore end up with worsening spinal cord injury and paraplegia. PT would like to think about it and see his shaman. I will return to discuss it further with him.
78 y/o male with hx of HIV, on HAART, benign stomach mass, depression currently p/w rapidly progressive acute ataxia with unilateral tremors suspicious for central etiology, r/o BG pathology.      Plan:  - check CD4/CD8, VL  - MRI Brain w/w/o gado  - MRI Cervical w/w/o gado  - check ACE, B12, folate, Lyme, toxo Ag, SPEP, UPEP, serum immunofixation, anti-Hu ab, anti-Yo ab, anti-Tr ab, anti-Ri abs  -PT eval  - continue memantine for now  - get records from Dr. Nunn's office

## 2018-05-10 NOTE — PROGRESS NOTE ADULT - SUBJECTIVE AND OBJECTIVE BOX
Neurology Progress Note    Interval History:      HPI:  76 y/o male with PMH of HIV on HAART and depression p/w the above complaint. yesterday patient had a mechanical fall and landed on his left shoulder. he hit his head but the was no LOC. No fever, n/v/d, CP, SOB, tingling, numbness or headache. patient is following with Dr. José Nunn for possible parkinson's disease. He was supposed to get MRI soon but he came to the ED.  Patient had been falling a lot lately secondary to ataxia. His hand tremors where getting worse for the last 2 months. otherwise he is at his baseline.    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  Recent and remote memory intact.  Naming, repetition and comprehension intact.  Attention/concentration intact.  No dysarthria, no aphasia.  Fund of knowledge appropriate.    Cranial nerves: Pupils equally round and reactive to light, visual fields full, no nystagmus, extraocular muscles intact, V1 through V3 intact bilaterally and symmetric, face symmetric, hearing intact to finger rub, palate elevation symmetric, tongue was midline.  Motor:  MRC grading 5/5 b/l UE/LE.   strength 5/5.  Normal tone and bulk.  No abnormal movements.    Sensation: Intact to light touch, proprioception, and pinprick.   Coordination: No dysmetria on finger-to-nose and heel-to-shin.  No dysdiadokinesia.  Reflexes: 2+ in bilateral UE/LE, downgoing toes bilaterally. (-) [place].  Gait: Narrow and steady. No ataxia.  Romberg negative    amantadine 100 milliGRAM(s) Oral daily  efavirenz 600/emtricitabine 200/tenofovir 300mg 1 Tablet(s) Oral at bedtime  enoxaparin Injectable 30 milliGRAM(s) SubCutaneous every 24 hours  FLUoxetine 40 milliGRAM(s) Oral daily  ondansetron    Tablet 4 milliGRAM(s) Oral every 8 hours PRN  oxyCODONE    5 mG/acetaminophen 325 mG 1 Tablet(s) Oral every 6 hours PRN  pantoprazole    Tablet 40 milliGRAM(s) Oral before breakfast  sodium chloride 0.9%. 1000 milliLiter(s) IV Continuous <Continuous>    TPro  6.4  /  Alb  3.9  /  TBili  x   /  DBili  x   /  AST  x   /  ALT  x   /  AlkPhos  x       LIVER FUNCTIONS - ( 08 May 2018 18:10 )  Alb: 3.9 g/dL / Pro: 6.4 g/dL / ALK PHOS: x     / ALT: x     / AST: x     / GGT: x             Urinalysis Basic - ( 08 May 2018 11:53 )    Color: Yellow / Appearance: Cloudy / S.025 / pH: x  Gluc: x / Ketone: Negative  / Bili: Negative / Urobili: 0.2 mg/dL   Blood: x / Protein: Trace mg/dL / Nitrite: Negative   Leuk Esterase: Negative / RBC: x / WBC x   Sq Epi: x / Non Sq Epi: x / Bacteria: Moderate /HPF    < from: MR Head w/wo IV Cont (18 @ 11:00) >  IMPRESSION:     No evidence of acute intracranial pathology.    Mild parenchymal volume loss and chronic microvascular ischemic changes.    JENAE CANTRELL M.D., ATTENDING RADIOLOGIST    This document has been electronically signed. May  9 2018  2:35PM  < end of copied text >    < from: MR Cervical Spine w/wo IV Cont (18 @ 11:00) >  Impression:    1. Significantly limited study secondary to motion artifact.    2. Cord compression and severe spinal canal stenosis C3-C4, C4-C5 and   C5-C6 secondary to chronic degenerative changes. Evaluation of cord   signal is limited.    JENAE CANTRELL M.D., ATTENDING RADIOLOGIST  This document has been electronically signed. May  9 2018  2:45PM    < end of copied text >

## 2018-05-11 LAB
ALBUMIN SERPL ELPH-MCNC: 4.6 G/DL — SIGNIFICANT CHANGE UP (ref 3.5–5.2)
ALP SERPL-CCNC: 63 U/L — SIGNIFICANT CHANGE UP (ref 30–115)
ALT FLD-CCNC: 31 U/L — SIGNIFICANT CHANGE UP (ref 0–41)
ANION GAP SERPL CALC-SCNC: 17 MMOL/L — HIGH (ref 7–14)
APTT BLD: 30.4 SEC — SIGNIFICANT CHANGE UP (ref 27–39.2)
AST SERPL-CCNC: 40 U/L — SIGNIFICANT CHANGE UP (ref 0–41)
BILIRUB DIRECT SERPL-MCNC: 0.2 MG/DL — SIGNIFICANT CHANGE UP (ref 0–0.2)
BILIRUB INDIRECT FLD-MCNC: 0.6 MG/DL — SIGNIFICANT CHANGE UP (ref 0.2–1.2)
BILIRUB SERPL-MCNC: 0.8 MG/DL — SIGNIFICANT CHANGE UP (ref 0.2–1.2)
BUN SERPL-MCNC: 24 MG/DL — HIGH (ref 10–20)
CALCIUM SERPL-MCNC: 9.6 MG/DL — SIGNIFICANT CHANGE UP (ref 8.5–10.1)
CHLORIDE SERPL-SCNC: 102 MMOL/L — SIGNIFICANT CHANGE UP (ref 98–110)
CO2 SERPL-SCNC: 21 MMOL/L — SIGNIFICANT CHANGE UP (ref 17–32)
CREAT SERPL-MCNC: 1.3 MG/DL — SIGNIFICANT CHANGE UP (ref 0.7–1.5)
GLUCOSE SERPL-MCNC: 84 MG/DL — SIGNIFICANT CHANGE UP (ref 70–99)
HCT VFR BLD CALC: 44.4 % — SIGNIFICANT CHANGE UP (ref 42–52)
HGB BLD-MCNC: 15 G/DL — SIGNIFICANT CHANGE UP (ref 14–18)
HIV-1 VIRAL LOAD RESULT: SIGNIFICANT CHANGE UP
HIV1 RNA # SERPL NAA+PROBE: SIGNIFICANT CHANGE UP
HIV1 RNA SERPL NAA+PROBE-ACNC: SIGNIFICANT CHANGE UP
HIV1 RNA SERPL NAA+PROBE-LOG#: SIGNIFICANT CHANGE UP LG COP/ML
HU1 AB SER-ACNC: NEGATIVE — SIGNIFICANT CHANGE UP
INR BLD: 1.09 RATIO — SIGNIFICANT CHANGE UP (ref 0.65–1.3)
MAGNESIUM SERPL-MCNC: 2 MG/DL — SIGNIFICANT CHANGE UP (ref 1.8–2.4)
MCHC RBC-ENTMCNC: 29.9 PG — SIGNIFICANT CHANGE UP (ref 27–31)
MCHC RBC-ENTMCNC: 33.8 G/DL — SIGNIFICANT CHANGE UP (ref 32–37)
MCV RBC AUTO: 88.6 FL — SIGNIFICANT CHANGE UP (ref 80–94)
NRBC # BLD: 0 /100 WBCS — SIGNIFICANT CHANGE UP (ref 0–0)
PLATELET # BLD AUTO: 148 K/UL — SIGNIFICANT CHANGE UP (ref 130–400)
POTASSIUM SERPL-MCNC: 5.2 MMOL/L — HIGH (ref 3.5–5)
POTASSIUM SERPL-SCNC: 5.2 MMOL/L — HIGH (ref 3.5–5)
PROT SERPL-MCNC: 7.4 G/DL — SIGNIFICANT CHANGE UP (ref 6–8)
PROTHROM AB SERPL-ACNC: 11.8 SEC — SIGNIFICANT CHANGE UP (ref 9.95–12.87)
PURKINJE CELLS AB CSF QL IB: NEGATIVE — SIGNIFICANT CHANGE UP
RBC # BLD: 5.01 M/UL — SIGNIFICANT CHANGE UP (ref 4.7–6.1)
RBC # FLD: 14.1 % — SIGNIFICANT CHANGE UP (ref 11.5–14.5)
SODIUM SERPL-SCNC: 140 MMOL/L — SIGNIFICANT CHANGE UP (ref 135–146)
WBC # BLD: 5.22 K/UL — SIGNIFICANT CHANGE UP (ref 4.8–10.8)
WBC # FLD AUTO: 5.22 K/UL — SIGNIFICANT CHANGE UP (ref 4.8–10.8)

## 2018-05-11 RX ADMIN — Medication 40 MILLIGRAM(S): at 12:46

## 2018-05-11 RX ADMIN — EFAVIRENZ, EMTRICITABINE AND TENOFOVIR DISOPROXIL FUMARATE 1 TABLET(S): 600; 200; 300 TABLET, FILM COATED ORAL at 22:15

## 2018-05-11 RX ADMIN — PANTOPRAZOLE SODIUM 40 MILLIGRAM(S): 20 TABLET, DELAYED RELEASE ORAL at 05:59

## 2018-05-11 RX ADMIN — ENOXAPARIN SODIUM 30 MILLIGRAM(S): 100 INJECTION SUBCUTANEOUS at 22:15

## 2018-05-11 RX ADMIN — Medication 100 MILLIGRAM(S): at 12:46

## 2018-05-11 RX ADMIN — ONDANSETRON 4 MILLIGRAM(S): 8 TABLET, FILM COATED ORAL at 09:47

## 2018-05-11 NOTE — ED ADULT NURSE REASSESSMENT NOTE - REASSESS COMMUNICATION
inpatient nurse report called/Patient has assigned bed but not showing up on the board bed management aware

## 2018-05-11 NOTE — CHART NOTE - NSCHARTNOTEFT_GEN_A_CORE
Spoke on the phone with patient's daughter, Waleska [phone], about the patient's condition. During the call, Waleska patched her other sister Mari into the call.     I explained to the daughters that given their father's findings on the MRI C-spine, Neurosurgery would like to do a decompression. I also spoke with Neurosurgery ([id number]) for a second time today about calling the patient's family to discuss the case with them.    The family had numerous questions regarding whether the patient would be better suited for traction at the chiropractor and if they can get images for their chiropractor to review.    I answered as many of their questions as possible. I encouraged the family to speak with Jeovanny and for all of them to come to the hospital to discuss the plan with the Neurosurgeons in person.     Time spent: 60min    Will follow.

## 2018-05-12 RX ADMIN — ENOXAPARIN SODIUM 30 MILLIGRAM(S): 100 INJECTION SUBCUTANEOUS at 21:13

## 2018-05-12 RX ADMIN — PANTOPRAZOLE SODIUM 40 MILLIGRAM(S): 20 TABLET, DELAYED RELEASE ORAL at 06:29

## 2018-05-12 RX ADMIN — EFAVIRENZ, EMTRICITABINE AND TENOFOVIR DISOPROXIL FUMARATE 1 TABLET(S): 600; 200; 300 TABLET, FILM COATED ORAL at 21:10

## 2018-05-12 RX ADMIN — Medication 100 MILLIGRAM(S): at 11:06

## 2018-05-12 RX ADMIN — ONDANSETRON 4 MILLIGRAM(S): 8 TABLET, FILM COATED ORAL at 11:35

## 2018-05-12 RX ADMIN — Medication 40 MILLIGRAM(S): at 11:06

## 2018-05-12 NOTE — PROGRESS NOTE ADULT - SUBJECTIVE AND OBJECTIVE BOX
SUBJECTIVE:    Patient is a 77y old Male who presents with a chief complaint of frequent Falls and imbalance (08 May 2018 13:49)    Currently admitted to medicine with the primary diagnosis of Cord compression     Today is hospital day 4d. This morning he is resting comfortably in bed and reports no new issues or overnight events.     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     ALLERGIES:  No Known Allergies    MEDICATIONS:  STANDING MEDICATIONS  amantadine 100 milliGRAM(s) Oral daily  diazepam    Tablet 2 milliGRAM(s) Oral once  efavirenz 600/emtricitabine 200/tenofovir 300mg 1 Tablet(s) Oral at bedtime  enoxaparin Injectable 30 milliGRAM(s) SubCutaneous every 24 hours  FLUoxetine 40 milliGRAM(s) Oral daily  pantoprazole    Tablet 40 milliGRAM(s) Oral before breakfast    PRN MEDICATIONS  ondansetron    Tablet 4 milliGRAM(s) Oral every 8 hours PRN  oxyCODONE    5 mG/acetaminophen 325 mG 1 Tablet(s) Oral every 6 hours PRN    VITALS:   T(F): 97  HR: 64  BP: 143/91  RR: 18  SpO2: --    LABS:                        15.0   5.22  )-----------( 148      ( 11 May 2018 09:40 )             44.4     05-11    140  |  102  |  24<H>  ----------------------------<  84  5.2<H>   |  21  |  1.3    Ca    9.6      11 May 2018 09:40  Mg     2.0     05-11    TPro  7.4  /  Alb  4.6  /  TBili  0.8  /  DBili  0.2  /  AST  40  /  ALT  31  /  AlkPhos  63  05-11    PT/INR - ( 11 May 2018 09:40 )   PT: 11.80 sec;   INR: 1.09 ratio         PTT - ( 11 May 2018 09:40 )  PTT:30.4 sec              RADIOLOGY:    MR Head w/wo IV Cont (05.09.18 @ 11:00)  No evidence of acute intracranial pathology. Mild parenchymal volume loss and chronic microvascular ischemic changes.    MR Cervical Spine w/wo IV Cont (05.09.18 @ 11:00)  1. Significantly limited study secondary to motion artifact.  2. Cord compression and severe spinal canal stenosis C3-C4, C4-C5 and C5-C6 secondary to chronic degenerative changes. Evaluation of cord   signal is limited.    PHYSICAL EXAM:    GENERAL: fragile and thin, AAOx3, resting tremor appreciated  HEENT:  Atraumatic, Normocephalic. EOMI, PERRLA, conjunctiva and sclera clear, No JVD  PULMONARY: Clear to auscultation bilaterally; No wheeze  CARDIOVASCULAR: RRR, S1, S2  GASTROINTESTINAL: Soft, Nontender, Nondistended; Bowel sounds present  MUSCULOSKELETAL:  2+ Peripheral Pulses, No clubbing, cyanosis, or edema  NEUROLOGY: unsteady gate otherwise non focal

## 2018-05-12 NOTE — PROGRESS NOTE ADULT - SUBJECTIVE AND OBJECTIVE BOX
Name: TOMY ESPINOSA  Age: 77y  Gender: Male    Pt was seen and examined.   doing good he says    Allergies:  No Known Allergies      PHYSICAL EXAM:    Vitals:  T(C): 36.8 (05-12-18 @ 00:31), Max: 37.3 (05-11-18 @ 15:43)  HR: 53 (05-12-18 @ 00:31) (53 - 63)  BP: 137/69 (05-12-18 @ 00:31) (118/75 - 162/86)  RR: 18 (05-12-18 @ 00:31) (18 - 20)  SpO2: 100% (05-11-18 @ 15:43) (100% - 100%)  Wt(kg): --Vital Signs Last 24 Hrs  T(C): 36.8 (12 May 2018 00:31), Max: 37.3 (11 May 2018 15:43)  T(F): 98.3 (12 May 2018 00:31), Max: 99.1 (11 May 2018 15:43)  HR: 53 (12 May 2018 00:31) (53 - 63)  BP: 137/69 (12 May 2018 00:31) (118/75 - 162/86)  BP(mean): --  RR: 18 (12 May 2018 00:31) (18 - 20)  SpO2: 100% (11 May 2018 15:43) (100% - 100%)      NECK: Supple, No JVD  CHEST/LUNG: CTA, B/L, No rales, rhonchi, wheezing, or rubs  HEART: S1,S2, N1 Regular rate and rhythm; No murmurs, rubs, or gallops  ABDOMEN: Soft, Nontender, Nondistended; Bowel sounds present  EXTREMITIES:  2+ Peripheral Pulses, No clubbing, cyanosis, or edema      LABS:                        15.0   5.22  )-----------( 148      ( 11 May 2018 09:40 )             44.4     05-11    140  |  102  |  24<H>  ----------------------------<  84  5.2<H>   |  21  |  1.3    Ca    9.6      11 May 2018 09:40  Mg     2.0     05-11    TPro  7.4  /  Alb  4.6  /  TBili  0.8  /  DBili  0.2  /  AST  40  /  ALT  31  /  AlkPhos  63  05-11    LIVER FUNCTIONS - ( 11 May 2018 09:40 )  Alb: 4.6 g/dL / Pro: 7.4 g/dL / ALK PHOS: 63 U/L / ALT: 31 U/L / AST: 40 U/L / GGT: x           MEDICATIONS  (STANDING):  amantadine 100 milliGRAM(s) Oral daily  efavirenz 600/emtricitabine 200/tenofovir 300mg 1 Tablet(s) Oral at bedtime  enoxaparin Injectable 30 milliGRAM(s) SubCutaneous every 24 hours  FLUoxetine 40 milliGRAM(s) Oral daily  pantoprazole    Tablet 40 milliGRAM(s) Oral before breakfast        RADIOLOGY & ADDITIONAL TESTS:    Imaging Personally Reviewed:  [ ] YES  [ ] NO    Assessment and Plan:    Assessment:  · Assessment		  78 y/o male with PMH of HIV on HAART and depression p/w frequent falls and imbalance.      #Frequent falls and imbalance/ Ataxia    likely secondary to severe c-spine stenosis  pt is now agreable to surgery,  will recall neurosurgery       #Imbalance and tremors   r/o parkinson's disease VS HIV releated central etiologyy    - check CD4/CD8, VL  - MRI Brain w/w/o gado  - MRI Cervical w/w/o gado  - check ACE, B12, folate, Lyme, toxo Ag, SPEP, UPEP, serum immunofixation, anti-Hu ab, anti-Yo ab, anti-Tr ab, anti-Ri abs  -PT eval  - continue amantadine   -f/u neurology eval    #HIV  - check CD4/CD8, VL  -c/w home medications    pt with chronic nausea so will need anti-emetics prn    PLAN - PER NEUROSURGERY             HE HAS FAILED  CONSERVATIVE MEDICAL TREATMENT

## 2018-05-12 NOTE — PROGRESS NOTE ADULT - SUBJECTIVE AND OBJECTIVE BOX
Pt seen with Dr. Arvizu. Pt may be willing to have surgery however, prior to any surgical planning, pt will need a repeat MRI c-spine with possible valium to remain still. The original C-spine MRI showed a lot of motion artifact. Also we recommend getting a CT - C spine. Discussed this with patient and will discuss with daughter when she arrives later today.

## 2018-05-12 NOTE — PROGRESS NOTE ADULT - SUBJECTIVE AND OBJECTIVE BOX
Name: TOMY ESPINOSA  Age: 77y  Gender: Male    Pt was seen and examined.   c/o:  feels same    Allergies:  No Known Allergies      PHYSICAL EXAM:    Vitals:  T(C): 36.1 (05-12-18 @ 15:47), Max: 36.8 (05-12-18 @ 00:31)  HR: 64 (05-12-18 @ 15:47) (53 - 64)  BP: 143/91 (05-12-18 @ 15:47) (124/59 - 146/76)  RR: 18 (05-12-18 @ 15:47) (17 - 18)  SpO2: --  Wt(kg): --Vital Signs Last 24 Hrs  T(C): 36.1 (12 May 2018 15:47), Max: 36.8 (12 May 2018 00:31)  T(F): 97 (12 May 2018 15:47), Max: 98.3 (12 May 2018 00:31)  HR: 64 (12 May 2018 15:47) (53 - 64)  BP: 143/91 (12 May 2018 15:47) (124/59 - 146/76)  BP(mean): --  RR: 18 (12 May 2018 15:47) (17 - 18)  SpO2: --      NECK: Supple, No JVD  CHEST/LUNG: CTA, B/L, No rales, rhonchi, wheezing, or rubs  HEART: S1,S2, N1 Regular rate and rhythm; No murmurs, rubs, or gallops  ABDOMEN: Soft, Nontender, Nondistended; Bowel sounds present  EXTREMITIES:  2+ Peripheral Pulses, No clubbing, cyanosis, or edema      LABS:                        15.0   5.22  )-----------( 148      ( 11 May 2018 09:40 )             44.4     05-11    140  |  102  |  24<H>  ----------------------------<  84  5.2<H>   |  21  |  1.3    Ca    9.6      11 May 2018 09:40  Mg     2.0     05-11    TPro  7.4  /  Alb  4.6  /  TBili  0.8  /  DBili  0.2  /  AST  40  /  ALT  31  /  AlkPhos  63  05-11    LIVER FUNCTIONS - ( 11 May 2018 09:40 )  Alb: 4.6 g/dL / Pro: 7.4 g/dL / ALK PHOS: 63 U/L / ALT: 31 U/L / AST: 40 U/L / GGT: x                 MEDICATIONS  (STANDING):  amantadine 100 milliGRAM(s) Oral daily  efavirenz 600/emtricitabine 200/tenofovir 300mg 1 Tablet(s) Oral at bedtime  enoxaparin Injectable 30 milliGRAM(s) SubCutaneous every 24 hours  FLUoxetine 40 milliGRAM(s) Oral daily  pantoprazole    Tablet 40 milliGRAM(s) Oral before breakfast        RADIOLOGY & ADDITIONAL TESTS:    Imaging Personally Reviewed:  [ ] YES  [ ] NO    Assessment and Plan:    Assessment:  · Assessment		  76 y/o male with PMH of HIV on HAART and depression p/w frequent falls and imbalance.      #Frequent falls and imbalance/ Ataxia    likely secondary to severe c-spine stenosis  pt is now agreable to surgery,  will recall neurosurgery       #Imbalance and tremors   r/o parkinson's disease VS HIV releated central etiologyy    - check CD4/CD8, VL  - MRI Brain w/w/o gado  - MRI Cervical w/w/o gado  - check ACE, B12, folate, Lyme, toxo Ag, SPEP, UPEP, serum immunofixation, anti-Hu ab, anti-Yo ab, anti-Tr ab, anti-Ri abs  -PT eval  - continue amantadine   -f/u neurology eval    #HIV  - check CD4/CD8, VL  -c/w home medications    pt with chronic nausea so will need anti-emetics prn    PLAN - PER NEUROSURGERY             HE HAS FAILED  CONSERVATIVE MEDICAL TREATMENT

## 2018-05-13 LAB
ALBUMIN SERPL ELPH-MCNC: 4.3 G/DL — SIGNIFICANT CHANGE UP (ref 3.5–5.2)
ALP SERPL-CCNC: 62 U/L — SIGNIFICANT CHANGE UP (ref 30–115)
ALT FLD-CCNC: 57 U/L — HIGH (ref 0–41)
ANION GAP SERPL CALC-SCNC: 16 MMOL/L — HIGH (ref 7–14)
AST SERPL-CCNC: 52 U/L — HIGH (ref 0–41)
BILIRUB DIRECT SERPL-MCNC: <0.2 MG/DL — SIGNIFICANT CHANGE UP (ref 0–0.2)
BILIRUB INDIRECT FLD-MCNC: >0.3 MG/DL — SIGNIFICANT CHANGE UP (ref 0.2–1.2)
BILIRUB SERPL-MCNC: 0.5 MG/DL — SIGNIFICANT CHANGE UP (ref 0.2–1.2)
BUN SERPL-MCNC: 35 MG/DL — HIGH (ref 10–20)
CALCIUM SERPL-MCNC: 9.4 MG/DL — SIGNIFICANT CHANGE UP (ref 8.5–10.1)
CHLORIDE SERPL-SCNC: 104 MMOL/L — SIGNIFICANT CHANGE UP (ref 98–110)
CO2 SERPL-SCNC: 21 MMOL/L — SIGNIFICANT CHANGE UP (ref 17–32)
CREAT SERPL-MCNC: 1.4 MG/DL — SIGNIFICANT CHANGE UP (ref 0.7–1.5)
GLUCOSE SERPL-MCNC: 90 MG/DL — SIGNIFICANT CHANGE UP (ref 70–99)
HCT VFR BLD CALC: 42.2 % — SIGNIFICANT CHANGE UP (ref 42–52)
HGB BLD-MCNC: 14.4 G/DL — SIGNIFICANT CHANGE UP (ref 14–18)
MAGNESIUM SERPL-MCNC: 2.1 MG/DL — SIGNIFICANT CHANGE UP (ref 1.8–2.4)
MCHC RBC-ENTMCNC: 30.2 PG — SIGNIFICANT CHANGE UP (ref 27–31)
MCHC RBC-ENTMCNC: 34.1 G/DL — SIGNIFICANT CHANGE UP (ref 32–37)
MCV RBC AUTO: 88.5 FL — SIGNIFICANT CHANGE UP (ref 80–94)
NRBC # BLD: 0 /100 WBCS — SIGNIFICANT CHANGE UP (ref 0–0)
PLATELET # BLD AUTO: 167 K/UL — SIGNIFICANT CHANGE UP (ref 130–400)
POTASSIUM SERPL-MCNC: 4.5 MMOL/L — SIGNIFICANT CHANGE UP (ref 3.5–5)
POTASSIUM SERPL-SCNC: 4.5 MMOL/L — SIGNIFICANT CHANGE UP (ref 3.5–5)
PROT SERPL-MCNC: 6.9 G/DL — SIGNIFICANT CHANGE UP (ref 6–8)
RBC # BLD: 4.77 M/UL — SIGNIFICANT CHANGE UP (ref 4.7–6.1)
RBC # FLD: 14.2 % — SIGNIFICANT CHANGE UP (ref 11.5–14.5)
SODIUM SERPL-SCNC: 141 MMOL/L — SIGNIFICANT CHANGE UP (ref 135–146)
WBC # BLD: 4.4 K/UL — LOW (ref 4.8–10.8)
WBC # FLD AUTO: 4.4 K/UL — LOW (ref 4.8–10.8)

## 2018-05-13 RX ORDER — POLYETHYLENE GLYCOL 3350 17 G/17G
17 POWDER, FOR SOLUTION ORAL DAILY
Qty: 0 | Refills: 0 | Status: DISCONTINUED | OUTPATIENT
Start: 2018-05-13 | End: 2018-05-19

## 2018-05-13 RX ORDER — SENNA PLUS 8.6 MG/1
2 TABLET ORAL AT BEDTIME
Qty: 0 | Refills: 0 | Status: DISCONTINUED | OUTPATIENT
Start: 2018-05-13 | End: 2018-05-19

## 2018-05-13 RX ORDER — DOCUSATE SODIUM 100 MG
100 CAPSULE ORAL THREE TIMES A DAY
Qty: 0 | Refills: 0 | Status: DISCONTINUED | OUTPATIENT
Start: 2018-05-13 | End: 2018-05-19

## 2018-05-13 RX ADMIN — PANTOPRAZOLE SODIUM 40 MILLIGRAM(S): 20 TABLET, DELAYED RELEASE ORAL at 05:33

## 2018-05-13 RX ADMIN — EFAVIRENZ, EMTRICITABINE AND TENOFOVIR DISOPROXIL FUMARATE 1 TABLET(S): 600; 200; 300 TABLET, FILM COATED ORAL at 21:24

## 2018-05-13 RX ADMIN — ONDANSETRON 4 MILLIGRAM(S): 8 TABLET, FILM COATED ORAL at 13:57

## 2018-05-13 RX ADMIN — Medication 2 MILLIGRAM(S): at 19:13

## 2018-05-13 RX ADMIN — Medication 100 MILLIGRAM(S): at 11:17

## 2018-05-13 RX ADMIN — ENOXAPARIN SODIUM 30 MILLIGRAM(S): 100 INJECTION SUBCUTANEOUS at 21:25

## 2018-05-13 RX ADMIN — Medication 40 MILLIGRAM(S): at 11:17

## 2018-05-13 NOTE — PROGRESS NOTE ADULT - SUBJECTIVE AND OBJECTIVE BOX
Name: TOMY ESPINOSA  Age: 77y  Gender: Male    Pt was seen and examined.   c/o:  no changes    Allergies:  No Known Allergies      PHYSICAL EXAM:    Vitals:  T(C): 35.7 (05-13-18 @ 23:33), Max: 36.8 (05-13-18 @ 15:21)  HR: 65 (05-13-18 @ 23:33) (65 - 109)  BP: 134/68 (05-13-18 @ 23:33) (128/76 - 134/68)  RR: 20 (05-13-18 @ 23:33) (18 - 20)  SpO2: --  Wt(kg): --Vital Signs Last 24 Hrs  T(C): 35.7 (13 May 2018 23:33), Max: 36.8 (13 May 2018 15:21)  T(F): 96.3 (13 May 2018 23:33), Max: 98.2 (13 May 2018 15:21)  HR: 65 (13 May 2018 23:33) (65 - 109)  BP: 134/68 (13 May 2018 23:33) (128/76 - 134/68)  BP(mean): --  RR: 20 (13 May 2018 23:33) (18 - 20)  SpO2: --      NECK: Supple, No JVD  CHEST/LUNG: CTA, B/L, No rales, rhonchi, wheezing,   HEART: S1,S2, N1 Regular rate and rhythm; No murmurs, rubs, or gallops  ABDOMEN: Soft, Nontender, Nondistended; Bowel sounds present  EXTREMITIES:  2+ Peripheral Pulses, No clubbing, cyanosis, or edema      LABS:                        14.4   4.40  )-----------( 167      ( 13 May 2018 11:03 )             42.2     05-13    141  |  104  |  35<H>  ----------------------------<  90  4.5   |  21  |  1.4    Ca    9.4      13 May 2018 11:03  Mg     2.1     05-13    TPro  6.9  /  Alb  4.3  /  TBili  0.5  /  DBili  <0.2  /  AST  52<H>  /  ALT  57<H>  /  AlkPhos  62  05-13    LIVER FUNCTIONS - ( 13 May 2018 11:03 )  Alb: 4.3 g/dL / Pro: 6.9 g/dL / ALK PHOS: 62 U/L / ALT: 57 U/L / AST: 52 U/L / GGT: x                 MEDICATIONS  (STANDING):  amantadine 100 milliGRAM(s) Oral daily  docusate sodium 100 milliGRAM(s) Oral three times a day  efavirenz 600/emtricitabine 200/tenofovir 300mg 1 Tablet(s) Oral at bedtime  enoxaparin Injectable 30 milliGRAM(s) SubCutaneous every 24 hours  FLUoxetine 40 milliGRAM(s) Oral daily  pantoprazole    Tablet 40 milliGRAM(s) Oral before breakfast  senna 2 Tablet(s) Oral at bedtime        RADIOLOGY & ADDITIONAL TESTS:    Imaging Personally Reviewed:  [ ] YES  [ ] NO    Assessment and Plan:    Assessment:  · Assessment		  76 y/o male with PMH of HIV on HAART and depression p/w frequent falls and imbalance.      #Frequent falls and imbalance/ Ataxia    likely secondary to severe c-spine stenosis  pt is now agreable to surgery,  neurosurgery following      #Imbalance and tremors   r/o parkinson's disease VS HIV releated central etiologyy    - check CD4/CD8, VL  - MRI Brain w/w/o gado  - MRI Cervical w/w/o gado  - check ACE, B12, folate, Lyme, toxo Ag, SPEP, UPEP, serum immunofixation, anti-Hu ab, anti-Yo ab, anti-Tr ab, anti-Ri abs  -PT eval  - continue amantadine   -f/u neurology eval    #HIV  - check CD4/CD8, VL  -c/w home medications    pt with chronic nausea so will need anti-emetics prn    PLAN - PER NEUROSURGERY             HE HAS FAILED  CONSERVATIVE MEDICAL TREATMENT

## 2018-05-14 LAB
ALBUMIN SERPL ELPH-MCNC: 4.5 G/DL — SIGNIFICANT CHANGE UP (ref 3.5–5.2)
ALP SERPL-CCNC: 68 U/L — SIGNIFICANT CHANGE UP (ref 30–115)
ALT FLD-CCNC: 69 U/L — HIGH (ref 0–41)
ANION GAP SERPL CALC-SCNC: 15 MMOL/L — HIGH (ref 7–14)
AST SERPL-CCNC: 58 U/L — HIGH (ref 0–41)
B BURGDOR C6 AB SER-ACNC: NEGATIVE — SIGNIFICANT CHANGE UP
B BURGDOR IGG+IGM SER-ACNC: <0.01 INDEX — SIGNIFICANT CHANGE UP (ref 0.01–0.89)
BILIRUB SERPL-MCNC: 0.6 MG/DL — SIGNIFICANT CHANGE UP (ref 0.2–1.2)
BUN SERPL-MCNC: 35 MG/DL — HIGH (ref 10–20)
CALCIUM SERPL-MCNC: 9.7 MG/DL — SIGNIFICANT CHANGE UP (ref 8.5–10.1)
CHLORIDE SERPL-SCNC: 103 MMOL/L — SIGNIFICANT CHANGE UP (ref 98–110)
CO2 SERPL-SCNC: 20 MMOL/L — SIGNIFICANT CHANGE UP (ref 17–32)
CREAT SERPL-MCNC: 1.4 MG/DL — SIGNIFICANT CHANGE UP (ref 0.7–1.5)
FOLATE SERPL-MCNC: >20 NG/ML — SIGNIFICANT CHANGE UP
GLUCOSE SERPL-MCNC: 83 MG/DL — SIGNIFICANT CHANGE UP (ref 70–99)
HCT VFR BLD CALC: 42.3 % — SIGNIFICANT CHANGE UP (ref 42–52)
HGB BLD-MCNC: 14.3 G/DL — SIGNIFICANT CHANGE UP (ref 14–18)
MAGNESIUM SERPL-MCNC: 2.1 MG/DL — SIGNIFICANT CHANGE UP (ref 1.8–2.4)
MCHC RBC-ENTMCNC: 30 PG — SIGNIFICANT CHANGE UP (ref 27–31)
MCHC RBC-ENTMCNC: 33.8 G/DL — SIGNIFICANT CHANGE UP (ref 32–37)
MCV RBC AUTO: 88.7 FL — SIGNIFICANT CHANGE UP (ref 80–94)
NRBC # BLD: 0 /100 WBCS — SIGNIFICANT CHANGE UP (ref 0–0)
PLATELET # BLD AUTO: 176 K/UL — SIGNIFICANT CHANGE UP (ref 130–400)
POTASSIUM SERPL-MCNC: 4.7 MMOL/L — SIGNIFICANT CHANGE UP (ref 3.5–5)
POTASSIUM SERPL-SCNC: 4.7 MMOL/L — SIGNIFICANT CHANGE UP (ref 3.5–5)
PROT SERPL-MCNC: 7 G/DL — SIGNIFICANT CHANGE UP (ref 6–8)
RBC # BLD: 4.77 M/UL — SIGNIFICANT CHANGE UP (ref 4.7–6.1)
RBC # FLD: 14.4 % — SIGNIFICANT CHANGE UP (ref 11.5–14.5)
SODIUM SERPL-SCNC: 138 MMOL/L — SIGNIFICANT CHANGE UP (ref 135–146)
T GONDII IGG SER QL: 41.3 IU/ML — HIGH
T GONDII IGG SER QL: POSITIVE
T GONDII IGM SER QL: <3 AU/ML — SIGNIFICANT CHANGE UP
T GONDII IGM SER QL: NEGATIVE — SIGNIFICANT CHANGE UP
TSH SERPL-MCNC: 2.87 UIU/ML — SIGNIFICANT CHANGE UP (ref 0.27–4.2)
VIT B12 SERPL-MCNC: 1261 PG/ML — HIGH (ref 232–1245)
WBC # BLD: 4.45 K/UL — LOW (ref 4.8–10.8)
WBC # FLD AUTO: 4.45 K/UL — LOW (ref 4.8–10.8)

## 2018-05-14 RX ADMIN — Medication 40 MILLIGRAM(S): at 12:49

## 2018-05-14 RX ADMIN — SENNA PLUS 2 TABLET(S): 8.6 TABLET ORAL at 21:53

## 2018-05-14 RX ADMIN — EFAVIRENZ, EMTRICITABINE AND TENOFOVIR DISOPROXIL FUMARATE 1 TABLET(S): 600; 200; 300 TABLET, FILM COATED ORAL at 21:55

## 2018-05-14 RX ADMIN — ENOXAPARIN SODIUM 30 MILLIGRAM(S): 100 INJECTION SUBCUTANEOUS at 21:55

## 2018-05-14 RX ADMIN — Medication 100 MILLIGRAM(S): at 05:43

## 2018-05-14 RX ADMIN — Medication 100 MILLIGRAM(S): at 12:50

## 2018-05-14 RX ADMIN — Medication 100 MILLIGRAM(S): at 21:53

## 2018-05-14 RX ADMIN — PANTOPRAZOLE SODIUM 40 MILLIGRAM(S): 20 TABLET, DELAYED RELEASE ORAL at 05:43

## 2018-05-14 NOTE — PROGRESS NOTE ADULT - SUBJECTIVE AND OBJECTIVE BOX
SUBJECTIVE:    Patient is a 77y old Male who presents with a chief complaint of frequent Falls and imbalance (08 May 2018 13:49)    Currently admitted to medicine with the primary diagnosis of Cord compression     Today is hospital day 6d. This morning he is resting comfortably in bed and reports no new issues or overnight events.     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     ALLERGIES:  No Known Allergies    MEDICATIONS:  STANDING MEDICATIONS  amantadine 100 milliGRAM(s) Oral daily  docusate sodium 100 milliGRAM(s) Oral three times a day  efavirenz 600/emtricitabine 200/tenofovir 300mg 1 Tablet(s) Oral at bedtime  enoxaparin Injectable 30 milliGRAM(s) SubCutaneous every 24 hours  FLUoxetine 40 milliGRAM(s) Oral daily  pantoprazole    Tablet 40 milliGRAM(s) Oral before breakfast  senna 2 Tablet(s) Oral at bedtime    PRN MEDICATIONS  ondansetron    Tablet 4 milliGRAM(s) Oral every 8 hours PRN  oxyCODONE    5 mG/acetaminophen 325 mG 1 Tablet(s) Oral every 6 hours PRN  polyethylene glycol 3350 17 Gram(s) Oral daily PRN    VITALS:   T(F): 97.5  HR: 61  BP: 132/63  RR: 18  SpO2: --    LABS:                        14.3   4.45  )-----------( 176      ( 14 May 2018 09:19 )             42.3     05-14    138  |  103  |  35<H>  ----------------------------<  83  4.7   |  20  |  1.4    Ca    9.7      14 May 2018 09:19  Mg     2.1     05-14    TPro  7.0  /  Alb  4.5  /  TBili  0.6  /  DBili  x   /  AST  58<H>  /  ALT  69<H>  /  AlkPhos  68  05-14                  RADIOLOGY:  MR Head w/wo IV Cont (05.09.18 @ 11:00)  No evidence of acute intracranial pathology. Mild parenchymal volume loss and chronic microvascular ischemic changes.    MR Cervical Spine w/wo IV Cont (05.09.18 @ 11:00)  1. Significantly limited study secondary to motion artifact.  2. Cord compression and severe spinal canal stenosis C3-C4, C4-C5 and C5-C6 secondary to chronic degenerative changes. Evaluation of cord   signal is limited.    PHYSICAL EXAM:  GENERAL: fragile and thin, AAOx3, resting tremor appreciated  HEENT:  Atraumatic, Normocephalic. EOMI, PERRLA, conjunctiva and sclera clear, poor dentition  PULMONARY: Clear to auscultation bilaterally; No wheeze  CARDIOVASCULAR: RRR, S1, S2  GASTROINTESTINAL: Soft, Nontender, Nondistended; Bowel sounds present  MUSCULOSKELETAL:  2+ Peripheral Pulses, No clubbing, cyanosis, or edema  NEUROLOGY: unsteady gate otherwise non focal

## 2018-05-14 NOTE — PROGRESS NOTE ADULT - SUBJECTIVE AND OBJECTIVE BOX
Name: TOMY ESPINOSA  Age: 77y  Gender: Male    Pt was seen and examined.   c/o: feels same     Allergies:  No Known Allergies      PHYSICAL EXAM:    Vitals:  T(C): 36.4 (05-14-18 @ 15:42), Max: 36.8 (05-14-18 @ 08:05)  HR: 61 (05-14-18 @ 15:42) (61 - 65)  BP: 132/63 (05-14-18 @ 15:42) (132/63 - 134/78)  RR: 18 (05-14-18 @ 15:42) (18 - 20)  SpO2: --  Wt(kg): --Vital Signs Last 24 Hrs  T(C): 36.4 (14 May 2018 15:42), Max: 36.8 (14 May 2018 08:05)  T(F): 97.5 (14 May 2018 15:42), Max: 98.3 (14 May 2018 08:05)  HR: 61 (14 May 2018 15:42) (61 - 65)  BP: 132/63 (14 May 2018 15:42) (132/63 - 134/78)  BP(mean): --  RR: 18 (14 May 2018 15:42) (18 - 20)  SpO2: --      NECK: Supple, No JVD  CHEST/LUNG: CTA, B/L, No rales, rhonchi, wheezing  HEART: S1,S2, N1 Regular rate and rhythm; No murmurs, rubs, or gallops  ABDOMEN: Soft, Nontender, Nondistended; Bowel sounds present  EXTREMITIES:  2+ Peripheral Pulses, No clubbing, cyanosis, or edema      LABS:                        14.3   4.45  )-----------( 176      ( 14 May 2018 09:19 )             42.3     05-14    138  |  103  |  35<H>  ----------------------------<  83  4.7   |  20  |  1.4    Ca    9.7      14 May 2018 09:19  Mg     2.1     05-14    TPro  7.0  /  Alb  4.5  /  TBili  0.6  /  DBili  x   /  AST  58<H>  /  ALT  69<H>  /  AlkPhos  68  05-14    LIVER FUNCTIONS - ( 14 May 2018 09:19 )  Alb: 4.5 g/dL / Pro: 7.0 g/dL / ALK PHOS: 68 U/L / ALT: 69 U/L / AST: 58 U/L / GGT: x                 MEDICATIONS  (STANDING):  amantadine 100 milliGRAM(s) Oral daily  docusate sodium 100 milliGRAM(s) Oral three times a day  efavirenz 600/emtricitabine 200/tenofovir 300mg 1 Tablet(s) Oral at bedtime  enoxaparin Injectable 30 milliGRAM(s) SubCutaneous every 24 hours  FLUoxetine 40 milliGRAM(s) Oral daily  pantoprazole    Tablet 40 milliGRAM(s) Oral before breakfast  senna 2 Tablet(s) Oral at bedtime        RADIOLOGY & ADDITIONAL TESTS:    Imaging Personally Reviewed:  [ ] YES  [ ] NO    A/P:    #Frequent falls and imbalance/ Ataxia    likely secondary to severe c-spine stenosis, pt is agreeable to surgery,  repeated MRI of cervical spine w and wo iv contrast as per neurosurgery request, patient needs MRI with anaesthesia as he was not able tolerate the repeat MRI even with the help of ativan.  High Vitamin B-12 with normal folate level, Toxoplasma Ig G was positive with Ig M being negative, negative results for lyme, normal SPEP on 8th May, 2018 and normal ACE levels   requested for CD4-419 and CD8-537 ratio: 0.8  anti-Hu ab and anti-Yo ab pending    Plan:  MRI with anesthesia the reconsult neurosurgery

## 2018-05-15 LAB
4/8 RATIO: 0.6 RATIO — LOW (ref 1.2–3.4)
ABS CD8: 876 /UL — HIGH (ref 206–494)
ACE SERPL-CCNC: 33 U/L — SIGNIFICANT CHANGE UP (ref 14–82)
ALBUMIN SERPL ELPH-MCNC: 4.1 G/DL — SIGNIFICANT CHANGE UP (ref 3.5–5.2)
ALP SERPL-CCNC: 64 U/L — SIGNIFICANT CHANGE UP (ref 30–115)
ALT FLD-CCNC: 71 U/L — HIGH (ref 0–41)
ANION GAP SERPL CALC-SCNC: 13 MMOL/L — SIGNIFICANT CHANGE UP (ref 7–14)
AST SERPL-CCNC: 55 U/L — HIGH (ref 0–41)
BASOPHILS # BLD AUTO: 0.06 K/UL — SIGNIFICANT CHANGE UP (ref 0–0.2)
BASOPHILS NFR BLD AUTO: 1.4 % — HIGH (ref 0–1)
BILIRUB SERPL-MCNC: 0.5 MG/DL — SIGNIFICANT CHANGE UP (ref 0.2–1.2)
BUN SERPL-MCNC: 35 MG/DL — HIGH (ref 10–20)
CALCIUM SERPL-MCNC: 9.2 MG/DL — SIGNIFICANT CHANGE UP (ref 8.5–10.1)
CD16+CD56+ CELLS NFR BLD: 12 % — SIGNIFICANT CHANGE UP (ref 4–20)
CD16+CD56+ CELLS NFR SPEC: 194 /UL — SIGNIFICANT CHANGE UP (ref 89–385)
CD19 BLASTS SPEC-ACNC: 1 % — LOW (ref 10–28)
CD19 BLASTS SPEC-ACNC: 12 /UL — SIGNIFICANT CHANGE UP (ref 72–502)
CD3 BLASTS SPEC-ACNC: 1389 /UL — SIGNIFICANT CHANGE UP (ref 800–2012)
CD3 BLASTS SPEC-ACNC: 86 % — HIGH (ref 59–81)
CD4 %: 31 % — SIGNIFICANT CHANGE UP (ref 42–58)
CD8 %: 54 % — SIGNIFICANT CHANGE UP (ref 14–30)
CHLORIDE SERPL-SCNC: 103 MMOL/L — SIGNIFICANT CHANGE UP (ref 98–110)
CO2 SERPL-SCNC: 20 MMOL/L — SIGNIFICANT CHANGE UP (ref 17–32)
CREAT SERPL-MCNC: 1.3 MG/DL — SIGNIFICANT CHANGE UP (ref 0.7–1.5)
EOSINOPHIL # BLD AUTO: 0.13 K/UL — SIGNIFICANT CHANGE UP (ref 0–0.7)
EOSINOPHIL NFR BLD AUTO: 2.9 % — SIGNIFICANT CHANGE UP (ref 0–8)
GLUCOSE SERPL-MCNC: 92 MG/DL — SIGNIFICANT CHANGE UP (ref 70–99)
HCT VFR BLD CALC: 40.1 % — LOW (ref 42–52)
HGB BLD-MCNC: 13.6 G/DL — LOW (ref 14–18)
IMM GRANULOCYTES NFR BLD AUTO: 0.5 % — HIGH (ref 0.1–0.3)
LYMPHOCYTES # BLD AUTO: 1.59 K/UL — SIGNIFICANT CHANGE UP (ref 1.2–3.4)
LYMPHOCYTES # BLD AUTO: 35.8 % — SIGNIFICANT CHANGE UP (ref 20.5–51.1)
MAGNESIUM SERPL-MCNC: 2.1 MG/DL — SIGNIFICANT CHANGE UP (ref 1.8–2.4)
MCHC RBC-ENTMCNC: 30 PG — SIGNIFICANT CHANGE UP (ref 27–31)
MCHC RBC-ENTMCNC: 33.9 G/DL — SIGNIFICANT CHANGE UP (ref 32–37)
MCV RBC AUTO: 88.5 FL — SIGNIFICANT CHANGE UP (ref 80–94)
MONOCYTES # BLD AUTO: 0.4 K/UL — SIGNIFICANT CHANGE UP (ref 0.1–0.6)
MONOCYTES NFR BLD AUTO: 9 % — SIGNIFICANT CHANGE UP (ref 1.7–9.3)
NEUTROPHILS # BLD AUTO: 2.24 K/UL — SIGNIFICANT CHANGE UP (ref 1.4–6.5)
NEUTROPHILS NFR BLD AUTO: 50.4 % — SIGNIFICANT CHANGE UP (ref 42.2–75.2)
PLATELET # BLD AUTO: 158 K/UL — SIGNIFICANT CHANGE UP (ref 130–400)
POTASSIUM SERPL-MCNC: 4.3 MMOL/L — SIGNIFICANT CHANGE UP (ref 3.5–5)
POTASSIUM SERPL-SCNC: 4.3 MMOL/L — SIGNIFICANT CHANGE UP (ref 3.5–5)
PROT SERPL-MCNC: 6.4 G/DL — SIGNIFICANT CHANGE UP (ref 6–8)
PROT SERPL-MCNC: 7.3 G/DL — SIGNIFICANT CHANGE UP (ref 6–8.3)
PROT SERPL-MCNC: 7.3 G/DL — SIGNIFICANT CHANGE UP (ref 6–8.3)
RBC # BLD: 4.53 M/UL — LOW (ref 4.7–6.1)
RBC # FLD: 14.3 % — SIGNIFICANT CHANGE UP (ref 11.5–14.5)
SODIUM SERPL-SCNC: 136 MMOL/L — SIGNIFICANT CHANGE UP (ref 135–146)
T-CELL CD4 SUBSET PNL BLD: 496 /UL — SIGNIFICANT CHANGE UP (ref 495–1312)
WBC # BLD: 4.44 K/UL — LOW (ref 4.8–10.8)
WBC # FLD AUTO: 4.44 K/UL — LOW (ref 4.8–10.8)

## 2018-05-15 RX ADMIN — ENOXAPARIN SODIUM 30 MILLIGRAM(S): 100 INJECTION SUBCUTANEOUS at 22:46

## 2018-05-15 RX ADMIN — PANTOPRAZOLE SODIUM 40 MILLIGRAM(S): 20 TABLET, DELAYED RELEASE ORAL at 06:07

## 2018-05-15 RX ADMIN — Medication 100 MILLIGRAM(S): at 06:07

## 2018-05-15 RX ADMIN — Medication 100 MILLIGRAM(S): at 22:45

## 2018-05-15 RX ADMIN — SENNA PLUS 2 TABLET(S): 8.6 TABLET ORAL at 22:46

## 2018-05-15 NOTE — DIETITIAN INITIAL EVALUATION ADULT. - ORAL INTAKE PTA
good/Pt reports good appetite/po intake and consumes 3 meals daily. Pt states he follows a vegetarian lifestyle PTA and denies use of oral nutrition supplements. Pt does take vitamins at home, but was unable to name them. NKFA.

## 2018-05-15 NOTE — PROGRESS NOTE ADULT - SUBJECTIVE AND OBJECTIVE BOX
SUBJECTIVE:    Patient is a 77y old Male who presents with a chief complaint of frequent Falls and imbalance (08 May 2018 13:49)    Currently admitted to medicine with the primary diagnosis of Cord compression     Today is hospital day 7d. This morning he is resting comfortably in bed and reports no new issues or overnight events.     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     ALLERGIES:  No Known Allergies    MEDICATIONS:  STANDING MEDICATIONS  amantadine 100 milliGRAM(s) Oral daily  docusate sodium 100 milliGRAM(s) Oral three times a day  efavirenz 600/emtricitabine 200/tenofovir 300mg 1 Tablet(s) Oral at bedtime  enoxaparin Injectable 30 milliGRAM(s) SubCutaneous every 24 hours  FLUoxetine 40 milliGRAM(s) Oral daily  pantoprazole    Tablet 40 milliGRAM(s) Oral before breakfast  senna 2 Tablet(s) Oral at bedtime    PRN MEDICATIONS  ondansetron    Tablet 4 milliGRAM(s) Oral every 8 hours PRN  oxyCODONE    5 mG/acetaminophen 325 mG 1 Tablet(s) Oral every 6 hours PRN  polyethylene glycol 3350 17 Gram(s) Oral daily PRN    VITALS:   T(F): 96.5  HR: 55  BP: 171/74  RR: 16  SpO2: --    LABS:                        13.6   4.44  )-----------( 158      ( 15 May 2018 06:29 )             40.1     05-15    136  |  103  |  35<H>  ----------------------------<  92  4.3   |  20  |  1.3    Ca    9.2      15 May 2018 06:29  Mg     2.1     05-15    TPro  6.4  /  Alb  4.1  /  TBili  0.5  /  DBili  x   /  AST  55<H>  /  ALT  71<H>  /  AlkPhos  64  05-15                  RADIOLOGY:    MR Head w/wo IV Cont (05.09.18 @ 11:00)  No evidence of acute intracranial pathology. Mild parenchymal volume loss and chronic microvascular ischemic changes.    MR Cervical Spine w/wo IV Cont (05.09.18 @ 11:00)  1. Significantly limited study secondary to motion artifact.  2. Cord compression and severe spinal canal stenosis C3-C4, C4-C5 and C5-C6 secondary to chronic degenerative changes. Evaluation of cord   signal is limited.    PHYSICAL EXAM:  GENERAL: anxious and slightly angry, thin, AAOx3, resting tremor present  HEENT:  Atraumatic, Normocephalic. EOMI, PERRLA, conjunctiva and sclera clear, poor dentition  PULMONARY: breath sounds present bilaterally  CARDIOVASCULAR: RRR, S1, S2  GASTROINTESTINAL: Soft, Nontender, Nondistended; Bowel sounds present  MUSCULOSKELETAL:  2+ Peripheral Pulses, No clubbing, cyanosis, or edema  NEUROLOGY: unsteady gate otherwise non focal

## 2018-05-15 NOTE — DIETITIAN INITIAL EVALUATION ADULT. - OTHER INFO
Pt p/w frequent falls and imbalance. Patient has imbalance and ataxia likely secondary to severe c-spine stenosis, pt is agreeable to surgery. He is going for a repeated MRI of cervical spine w and wo iv contrast under anaesthesia today afternoon as per neurosurgery request. Per EMR, pt's weight has been fluctuating drastically likely d/t weight scale inaccuracy. Per pt report he has been stable at ~120# for quite some time now. Reason for Assessment: LOS

## 2018-05-15 NOTE — DIETITIAN INITIAL EVALUATION ADULT. - ENERGY NEEDS
Estimated Calorie Needs: 6834-7657 kcal/day (25-30 kcal/kg IBW)   Estimated Protein Needs: 56-67 gm/day (1-1.2 gm/kg IBW)   Estimated Fluids Needs: 1 ml/kcal

## 2018-05-16 LAB
% ALBUMIN: 59.1 % — SIGNIFICANT CHANGE UP
% ALPHA 1: 4.3 % — SIGNIFICANT CHANGE UP
% ALPHA 2: 11.1 % — SIGNIFICANT CHANGE UP
% BETA: 10 % — SIGNIFICANT CHANGE UP
% GAMMA: 15.5 % — SIGNIFICANT CHANGE UP
ALBUMIN SERPL ELPH-MCNC: 4.3 G/DL — SIGNIFICANT CHANGE UP (ref 3.6–5.5)
ALBUMIN/GLOB SERPL ELPH: 1.4 RATIO — SIGNIFICANT CHANGE UP
ALPHA1 GLOB SERPL ELPH-MCNC: 0.3 G/DL — SIGNIFICANT CHANGE UP (ref 0.1–0.4)
ALPHA2 GLOB SERPL ELPH-MCNC: 0.8 G/DL — SIGNIFICANT CHANGE UP (ref 0.5–1)
ANION GAP SERPL CALC-SCNC: 18 MMOL/L — HIGH (ref 7–14)
B-GLOBULIN SERPL ELPH-MCNC: 0.7 G/DL — SIGNIFICANT CHANGE UP (ref 0.5–1)
BASOPHILS # BLD AUTO: 0.07 K/UL — SIGNIFICANT CHANGE UP (ref 0–0.2)
BASOPHILS NFR BLD AUTO: 1.6 % — HIGH (ref 0–1)
BUN SERPL-MCNC: 34 MG/DL — HIGH (ref 10–20)
CALCIUM SERPL-MCNC: 9.4 MG/DL — SIGNIFICANT CHANGE UP (ref 8.5–10.1)
CHLORIDE SERPL-SCNC: 106 MMOL/L — SIGNIFICANT CHANGE UP (ref 98–110)
CO2 SERPL-SCNC: 16 MMOL/L — LOW (ref 17–32)
EOSINOPHIL # BLD AUTO: 0.08 K/UL — SIGNIFICANT CHANGE UP (ref 0–0.7)
EOSINOPHIL NFR BLD AUTO: 1.9 % — SIGNIFICANT CHANGE UP (ref 0–8)
GAMMA GLOBULIN: 1.1 G/DL — SIGNIFICANT CHANGE UP (ref 0.6–1.6)
GLUCOSE SERPL-MCNC: 98 MG/DL — SIGNIFICANT CHANGE UP (ref 70–99)
HCT VFR BLD CALC: 42.6 % — SIGNIFICANT CHANGE UP (ref 42–52)
HGB BLD-MCNC: 14.6 G/DL — SIGNIFICANT CHANGE UP (ref 14–18)
IMM GRANULOCYTES NFR BLD AUTO: 0.5 % — HIGH (ref 0.1–0.3)
INTERPRETATION SERPL IFE-IMP: SIGNIFICANT CHANGE UP
LYMPHOCYTES # BLD AUTO: 1.74 K/UL — SIGNIFICANT CHANGE UP (ref 1.2–3.4)
LYMPHOCYTES # BLD AUTO: 40.8 % — SIGNIFICANT CHANGE UP (ref 20.5–51.1)
MAGNESIUM SERPL-MCNC: 2 MG/DL — SIGNIFICANT CHANGE UP (ref 1.8–2.4)
MCHC RBC-ENTMCNC: 30.2 PG — SIGNIFICANT CHANGE UP (ref 27–31)
MCHC RBC-ENTMCNC: 34.3 G/DL — SIGNIFICANT CHANGE UP (ref 32–37)
MCV RBC AUTO: 88.2 FL — SIGNIFICANT CHANGE UP (ref 80–94)
MONOCYTES # BLD AUTO: 0.45 K/UL — SIGNIFICANT CHANGE UP (ref 0.1–0.6)
MONOCYTES NFR BLD AUTO: 10.6 % — HIGH (ref 1.7–9.3)
NEUTROPHILS # BLD AUTO: 1.9 K/UL — SIGNIFICANT CHANGE UP (ref 1.4–6.5)
NEUTROPHILS NFR BLD AUTO: 44.6 % — SIGNIFICANT CHANGE UP (ref 42.2–75.2)
PLATELET # BLD AUTO: 180 K/UL — SIGNIFICANT CHANGE UP (ref 130–400)
POTASSIUM SERPL-MCNC: 4.1 MMOL/L — SIGNIFICANT CHANGE UP (ref 3.5–5)
POTASSIUM SERPL-SCNC: 4.1 MMOL/L — SIGNIFICANT CHANGE UP (ref 3.5–5)
PROT PATTERN SERPL ELPH-IMP: SIGNIFICANT CHANGE UP
RBC # BLD: 4.83 M/UL — SIGNIFICANT CHANGE UP (ref 4.7–6.1)
RBC # FLD: 14.2 % — SIGNIFICANT CHANGE UP (ref 11.5–14.5)
SODIUM SERPL-SCNC: 140 MMOL/L — SIGNIFICANT CHANGE UP (ref 135–146)
WBC # BLD: 4.26 K/UL — LOW (ref 4.8–10.8)
WBC # FLD AUTO: 4.26 K/UL — LOW (ref 4.8–10.8)

## 2018-05-16 RX ADMIN — Medication 100 MILLIGRAM(S): at 06:01

## 2018-05-16 RX ADMIN — Medication 40 MILLIGRAM(S): at 11:45

## 2018-05-16 RX ADMIN — EFAVIRENZ, EMTRICITABINE AND TENOFOVIR DISOPROXIL FUMARATE 1 TABLET(S): 600; 200; 300 TABLET, FILM COATED ORAL at 21:31

## 2018-05-16 RX ADMIN — Medication 100 MILLIGRAM(S): at 11:45

## 2018-05-16 RX ADMIN — PANTOPRAZOLE SODIUM 40 MILLIGRAM(S): 20 TABLET, DELAYED RELEASE ORAL at 06:01

## 2018-05-16 RX ADMIN — ENOXAPARIN SODIUM 30 MILLIGRAM(S): 100 INJECTION SUBCUTANEOUS at 21:31

## 2018-05-16 NOTE — PROGRESS NOTE ADULT - SUBJECTIVE AND OBJECTIVE BOX
SUBJECTIVE:    Patient is a 77y old Male who presents with a chief complaint of frequent Falls and imbalance (08 May 2018 13:49)    Currently admitted to medicine with the primary diagnosis of Cord compression     Today is hospital day 8d. This morning he is resting comfortably in bed and reports no new issues or overnight events.     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     ALLERGIES:  No Known Allergies    MEDICATIONS:  STANDING MEDICATIONS  amantadine 100 milliGRAM(s) Oral daily  docusate sodium 100 milliGRAM(s) Oral three times a day  efavirenz 600/emtricitabine 200/tenofovir 300mg 1 Tablet(s) Oral at bedtime  enoxaparin Injectable 30 milliGRAM(s) SubCutaneous every 24 hours  FLUoxetine 40 milliGRAM(s) Oral daily  pantoprazole    Tablet 40 milliGRAM(s) Oral before breakfast  senna 2 Tablet(s) Oral at bedtime    PRN MEDICATIONS  ondansetron    Tablet 4 milliGRAM(s) Oral every 8 hours PRN  polyethylene glycol 3350 17 Gram(s) Oral daily PRN    VITALS:   T(F): 96.6  HR: 55  BP: 135/70  RR: 18  SpO2: --    LABS:                        14.6   4.26  )-----------( 180      ( 16 May 2018 09:56 )             42.6     05-15    136  |  103  |  35<H>  ----------------------------<  92  4.3   |  20  |  1.3    Ca    9.2      15 May 2018 06:29  Mg     2.1     05-15    TPro  6.4  /  Alb  4.1  /  TBili  0.5  /  DBili  x   /  AST  55<H>  /  ALT  71<H>  /  AlkPhos  64  05-15                  RADIOLOGY:  MR Head w/wo IV Cont (05.09.18 @ 11:00)  No evidence of acute intracranial pathology. Mild parenchymal volume loss and chronic microvascular ischemic changes.    MR Cervical Spine w/wo IV Cont (05.15.18 @ 18:18) >  Severe multilevel degenerative changes again seen. Mild cord compression   and mild cord signal abnormality C4-5 and C5-6 as described above.    PHYSICAL EXAM:  GENERAL: anxious, thin, AAOx3, resting tremor present  HEENT:  Atraumatic, Normocephalic. EOMI, PERRLA, conjunctiva and sclera clear, poor dentition  PULMONARY: breath sounds present bilaterally  CARDIOVASCULAR: RRR, S1, S2  GASTROINTESTINAL: Soft, Nontender, Nondistended; Bowel sounds present  MUSCULOSKELETAL:  2+ Peripheral Pulses, No clubbing, cyanosis, or edema  NEUROLOGY: unsteady gate otherwise non focal

## 2018-05-16 NOTE — PROGRESS NOTE ADULT - SUBJECTIVE AND OBJECTIVE BOX
Name: TOMY ESPINOSA  Age: 77y  Gender: Male    Pt was seen and examined.   feels about same, still in pain    Allergies:  No Known Allergies      PHYSICAL EXAM:    Vitals:  T(C): 35.8 (05-15-18 @ 23:52), Max: 35.8 (05-15-18 @ 07:34)  HR: 58 (05-15-18 @ 23:52) (55 - 58)  BP: 150/75 (05-15-18 @ 23:52) (150/75 - 171/74)  RR: 18 (05-15-18 @ 23:52) (16 - 18)  SpO2: --  Wt(kg): --Vital Signs Last 24 Hrs  T(C): 35.8 (15 May 2018 23:52), Max: 35.8 (15 May 2018 07:34)  T(F): 96.4 (15 May 2018 23:52), Max: 96.5 (15 May 2018 07:34)  HR: 58 (15 May 2018 23:52) (55 - 58)  BP: 150/75 (15 May 2018 23:52) (150/75 - 171/74)  BP(mean): --  RR: 18 (15 May 2018 23:52) (16 - 18)  SpO2: --      NECK: Supple, No JVD  CHEST/LUNG: CTA, B/L, No rales, rhonchi, wheezing, or rubs  HEART: S1,S2, N1 Regular rate and rhythm; No murmurs, rubs, or gallops  ABDOMEN: Soft, Nontender, Nondistended; Bowel sounds present  EXTREMITIES:  2+ Peripheral Pulses, No clubbing, cyanosis, or edema      LABS:                        13.6   4.44  )-----------( 158      ( 15 May 2018 06:29 )             40.1     05-15    136  |  103  |  35<H>  ----------------------------<  92  4.3   |  20  |  1.3    Ca    9.2      15 May 2018 06:29  Mg     2.1     05-15    TPro  6.4  /  Alb  4.1  /  TBili  0.5  /  DBili  x   /  AST  55<H>  /  ALT  71<H>  /  AlkPhos  64  05-15    LIVER FUNCTIONS - ( 15 May 2018 06:29 )  Alb: 4.1 g/dL / Pro: 6.4 g/dL / ALK PHOS: 64 U/L / ALT: 71 U/L / AST: 55 U/L / GGT: x                 MEDICATIONS  (STANDING):  amantadine 100 milliGRAM(s) Oral daily  docusate sodium 100 milliGRAM(s) Oral three times a day  efavirenz 600/emtricitabine 200/tenofovir 300mg 1 Tablet(s) Oral at bedtime  enoxaparin Injectable 30 milliGRAM(s) SubCutaneous every 24 hours  FLUoxetine 40 milliGRAM(s) Oral daily  pantoprazole    Tablet 40 milliGRAM(s) Oral before breakfast  senna 2 Tablet(s) Oral at bedtime        RADIOLOGY & ADDITIONAL TESTS:    Imaging Personally Reviewed:  [ ] YES  [ ] NO    A/P:    #Frequent falls and imbalance/ Ataxia    likely secondary to severe c-spine stenosis, pt is agreeable to surgery,  repeated MRI of cervical spine w and wo iv contrast as per neurosurgery request, patient needs MRI with anaesthesia as he was not able tolerate the repeat MRI even with the help of ativan.  High Vitamin B-12 with normal folate level, Toxoplasma Ig G was positive with Ig M being negative, negative results for lyme, normal SPEP on 8th May, 2018 and normal ACE levels   requested for CD4-419 and CD8-537 ratio: 0.8  anti-Hu ab and anti-Yo ab pending    Plan:  MRI with anesthesia the reconsult neurosurgery          seen by anesthesia already

## 2018-05-17 RX ORDER — DEXAMETHASONE 0.5 MG/5ML
4 ELIXIR ORAL EVERY 6 HOURS
Qty: 0 | Refills: 0 | Status: DISCONTINUED | OUTPATIENT
Start: 2018-05-17 | End: 2018-05-19

## 2018-05-17 RX ADMIN — Medication 4 MILLIGRAM(S): at 23:30

## 2018-05-17 RX ADMIN — Medication 100 MILLIGRAM(S): at 21:55

## 2018-05-17 RX ADMIN — EFAVIRENZ, EMTRICITABINE AND TENOFOVIR DISOPROXIL FUMARATE 1 TABLET(S): 600; 200; 300 TABLET, FILM COATED ORAL at 21:55

## 2018-05-17 RX ADMIN — SENNA PLUS 2 TABLET(S): 8.6 TABLET ORAL at 21:57

## 2018-05-17 RX ADMIN — PANTOPRAZOLE SODIUM 40 MILLIGRAM(S): 20 TABLET, DELAYED RELEASE ORAL at 05:38

## 2018-05-17 RX ADMIN — ENOXAPARIN SODIUM 30 MILLIGRAM(S): 100 INJECTION SUBCUTANEOUS at 21:56

## 2018-05-17 RX ADMIN — Medication 100 MILLIGRAM(S): at 13:17

## 2018-05-17 RX ADMIN — Medication 40 MILLIGRAM(S): at 12:00

## 2018-05-17 RX ADMIN — Medication 100 MILLIGRAM(S): at 12:00

## 2018-05-17 NOTE — PROGRESS NOTE ADULT - SUBJECTIVE AND OBJECTIVE BOX
Name: TOMY ESPINOSA  Age: 77y  Gender: Male    Pt was seen and examined.   c/o: doing same    Allergies:  No Known Allergies      PHYSICAL EXAM:    Vitals:  T(C): 35.7 (05-16-18 @ 15:47), Max: 35.9 (05-16-18 @ 08:16)  HR: 59 (05-16-18 @ 15:47) (55 - 59)  BP: 129/75 (05-16-18 @ 15:47) (129/75 - 135/70)  RR: 18 (05-16-18 @ 15:47) (18 - 18)  SpO2: --  Wt(kg): --Vital Signs Last 24 Hrs  T(C): 35.7 (16 May 2018 15:47), Max: 35.9 (16 May 2018 08:16)  T(F): 96.3 (16 May 2018 15:47), Max: 96.6 (16 May 2018 08:16)  HR: 59 (16 May 2018 15:47) (55 - 59)  BP: 129/75 (16 May 2018 15:47) (129/75 - 135/70)  BP(mean): --  RR: 18 (16 May 2018 15:47) (18 - 18)  SpO2: --      NECK: Supple, No JVD  CHEST/LUNG: CTA, B/L, No rales, rhonchi, wheezing,   HEART: S1,S2, N1 Regular rate and rhythm; No murmurs, rubs, or gallops  ABDOMEN: Soft, Nontender, Nondistended; Bowel sounds present  EXTREMITIES:  2+ Peripheral Pulses, No clubbing, cyanosis, or edema      LABS:                        14.6   4.26  )-----------( 180      ( 16 May 2018 09:56 )             42.6     05-16    140  |  106  |  34<H>  ----------------------------<  98  4.1   |  16<L>  |  x     Ca    9.4      16 May 2018 09:56  Mg     2.0     05-16    TPro  6.4  /  Alb  4.1  /  TBili  0.5  /  DBili  x   /  AST  55<H>  /  ALT  71<H>  /  AlkPhos  64  05-15    LIVER FUNCTIONS - ( 15 May 2018 06:29 )  Alb: 4.1 g/dL / Pro: 6.4 g/dL / ALK PHOS: 64 U/L / ALT: 71 U/L / AST: 55 U/L / GGT: x                 MEDICATIONS  (STANDING):  amantadine 100 milliGRAM(s) Oral daily  docusate sodium 100 milliGRAM(s) Oral three times a day  efavirenz 600/emtricitabine 200/tenofovir 300mg 1 Tablet(s) Oral at bedtime  enoxaparin Injectable 30 milliGRAM(s) SubCutaneous every 24 hours  FLUoxetine 40 milliGRAM(s) Oral daily  pantoprazole    Tablet 40 milliGRAM(s) Oral before breakfast  senna 2 Tablet(s) Oral at bedtime        RADIOLOGY & ADDITIONAL TESTS:    Imaging Personally Reviewed:  [ ] YES  [ ] NO    A/P:    #Frequent falls and imbalance/ Ataxia    likely secondary to severe c-spine stenosis, pt is agreeable to surgery,  repeated MRI of cervical spine w and wo iv contrast as per neurosurgery request, patient needs MRI with anaesthesia as he was not able tolerate the repeat MRI even with the help of ativan.  High Vitamin B-12 with normal folate level, Toxoplasma Ig G was positive with Ig M being negative, negative results for lyme, normal SPEP on 8th May, 2018 and normal ACE levels   requested for CD4-419 and CD8-537 ratio: 0.8  anti-Hu ab and anti-Yo ab pending    Plan:  CT c spine done, confirms severe stenosis          seen by anesthesia already       neurosurgery to see in am

## 2018-05-17 NOTE — PROGRESS NOTE ADULT - SUBJECTIVE AND OBJECTIVE BOX
SUBJECTIVE:    Patient is a 77y old Male who presents with a chief complaint of frequent Falls and imbalance (08 May 2018 13:49)    Currently admitted to medicine with the primary diagnosis of Cord compression     Today is hospital day 9d. This morning he is resting comfortably in bed and reports no new issues or overnight events.     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     ALLERGIES:  No Known Allergies    MEDICATIONS:  STANDING MEDICATIONS  amantadine 100 milliGRAM(s) Oral daily  dexamethasone     Tablet 4 milliGRAM(s) Oral every 6 hours  docusate sodium 100 milliGRAM(s) Oral three times a day  efavirenz 600/emtricitabine 200/tenofovir 300mg 1 Tablet(s) Oral at bedtime  enoxaparin Injectable 30 milliGRAM(s) SubCutaneous every 24 hours  FLUoxetine 40 milliGRAM(s) Oral daily  pantoprazole    Tablet 40 milliGRAM(s) Oral before breakfast  senna 2 Tablet(s) Oral at bedtime    PRN MEDICATIONS  ondansetron    Tablet 4 milliGRAM(s) Oral every 8 hours PRN  polyethylene glycol 3350 17 Gram(s) Oral daily PRN    VITALS:   T(F): 96.2  HR: 56  BP: 130/62  RR: 19  SpO2: --    LABS:                        14.6   4.26  )-----------( 180      ( 16 May 2018 09:56 )             42.6     05-16    140  |  106  |  34<H>  ----------------------------<  98  4.1   |  16<L>  |  x     Ca    9.4      16 May 2018 09:56  Mg     2.0     05-16                    RADIOLOGY:  MR Head w/wo IV Cont (05.09.18 @ 11:00)  No evidence of acute intracranial pathology. Mild parenchymal volume loss and chronic microvascular ischemic changes.    MR Cervical Spine w/wo IV Cont (05.15.18 @ 18:18) >  Severe multilevel degenerative changes again seen. Mild cord compression   and mild cord signal abnormality C4-5 and C5-6 as described above.    CT Cervical Spine No Cont (05.16.18 @ 16:57) >  Severe cervical spine degenerative changes with severe spinal stenosis   C3-4, C4-5, C5-6 and C6-7 and multilevel moderate foraminal narrowing.   Associated spinal cord compression better appreciated on the MRI from the  prior day.    PHYSICAL EXAM:  GENERAL: anxious, thin, AAOx3, resting tremor present  HEENT:  Atraumatic, Normocephalic. EOMI, PERRLA, conjunctiva and sclera clear, poor dentition  PULMONARY: breath sounds present bilaterally  CARDIOVASCULAR: RRR, S1, S2  GASTROINTESTINAL: Soft, Nontender, Nondistended; Bowel sounds present  MUSCULOSKELETAL:  2+ Peripheral Pulses, No clubbing, cyanosis, or edema  NEUROLOGY: unsteady gate otherwise non focal

## 2018-05-17 NOTE — PROGRESS NOTE ADULT - SUBJECTIVE AND OBJECTIVE BOX
Name: TOMY ESPINOSA  Age: 77y  Gender: Male    Pt was seen and examined.   c/o: about same, seen by neurosurgery he says    Allergies:  No Known Allergies      PHYSICAL EXAM:    Vitals:  T(C): 35.7 (05-17-18 @ 15:31), Max: 35.9 (05-17-18 @ 07:18)  HR: 56 (05-17-18 @ 15:31) (55 - 56)  BP: 130/62 (05-17-18 @ 15:31) (125/58 - 130/62)  RR: 19 (05-17-18 @ 15:31) (18 - 19)  SpO2: --  Wt(kg): --Vital Signs Last 24 Hrs  T(C): 35.7 (17 May 2018 15:31), Max: 35.9 (17 May 2018 07:18)  T(F): 96.2 (17 May 2018 15:31), Max: 96.7 (17 May 2018 07:18)  HR: 56 (17 May 2018 15:31) (55 - 56)  BP: 130/62 (17 May 2018 15:31) (125/58 - 130/62)  BP(mean): --  RR: 19 (17 May 2018 15:31) (18 - 19)  SpO2: --      NECK: Supple, No JVD  CHEST/LUNG: CTA, B/L, No rales, rhonchi, wheezing, or rubs  HEART: S1,S2, N1 Regular rate and rhythm; No murmurs, rubs, or gallops  ABDOMEN: Soft, Nontender, Nondistended; Bowel sounds present  EXTREMITIES:  2+ Peripheral Pulses, No clubbing, cyanosis, or edema      LABS:                        14.6   4.26  )-----------( 180      ( 16 May 2018 09:56 )             42.6     05-16    140  |  106  |  34<H>  ----------------------------<  98  4.1   |  16<L>  |  x     Ca    9.4      16 May 2018 09:56  Mg     2.0     05-16      MEDICATIONS  (STANDING):  amantadine 100 milliGRAM(s) Oral daily  dexamethasone     Tablet 4 milliGRAM(s) Oral every 6 hours  docusate sodium 100 milliGRAM(s) Oral three times a day  efavirenz 600/emtricitabine 200/tenofovir 300mg 1 Tablet(s) Oral at bedtime  enoxaparin Injectable 30 milliGRAM(s) SubCutaneous every 24 hours  FLUoxetine 40 milliGRAM(s) Oral daily  pantoprazole    Tablet 40 milliGRAM(s) Oral before breakfast  senna 2 Tablet(s) Oral at bedtime        RADIOLOGY & ADDITIONAL TESTS:    Imaging Personally Reviewed:  [ ] YES  [ ] NO    A/P:    #Frequent falls and imbalance/ Ataxia    likely secondary to severe c-spine stenosis, pt is agreeable to surgery,  repeated MRI of cervical spine w and wo iv contrast as per neurosurgery request, patient needs MRI with anaesthesia as he was not able tolerate the repeat MRI even with the help of ativan.    High Vitamin B-12 with normal folate level, Toxoplasma Ig G was positive with Ig M being negative, negative results for lyme, normal SPEP on 8th May, 2018 and normal ACE levels   requested for CD4-419 and CD8-537 ratio: 0.8    HIV positive for many years.  Pt has h/o prior HIV related diseases although currently asymptomatic    Plan:  CT c spine done, confirms severe stenosis          seen by anesthesia already       neurosurgery seen by today, surgery on Saturday per pt

## 2018-05-18 LAB
ALBUMIN SERPL ELPH-MCNC: 4.1 G/DL — SIGNIFICANT CHANGE UP (ref 3.5–5.2)
ALBUMIN SERPL ELPH-MCNC: 4.5 G/DL — SIGNIFICANT CHANGE UP (ref 3.5–5.2)
ALP SERPL-CCNC: 66 U/L — SIGNIFICANT CHANGE UP (ref 30–115)
ALP SERPL-CCNC: 77 U/L — SIGNIFICANT CHANGE UP (ref 30–115)
ALT FLD-CCNC: 47 U/L — HIGH (ref 0–41)
ALT FLD-CCNC: 55 U/L — HIGH (ref 0–41)
ANION GAP SERPL CALC-SCNC: 15 MMOL/L — HIGH (ref 7–14)
ANION GAP SERPL CALC-SCNC: 15 MMOL/L — HIGH (ref 7–14)
APTT BLD: 27.3 SEC — SIGNIFICANT CHANGE UP (ref 27–39.2)
AST SERPL-CCNC: 29 U/L — SIGNIFICANT CHANGE UP (ref 0–41)
AST SERPL-CCNC: 33 U/L — SIGNIFICANT CHANGE UP (ref 0–41)
BASOPHILS # BLD AUTO: 0.05 K/UL — SIGNIFICANT CHANGE UP (ref 0–0.2)
BASOPHILS # BLD AUTO: 0.07 K/UL — SIGNIFICANT CHANGE UP (ref 0–0.2)
BASOPHILS NFR BLD AUTO: 1 % — SIGNIFICANT CHANGE UP (ref 0–1)
BASOPHILS NFR BLD AUTO: 1.2 % — HIGH (ref 0–1)
BILIRUB SERPL-MCNC: 0.4 MG/DL — SIGNIFICANT CHANGE UP (ref 0.2–1.2)
BILIRUB SERPL-MCNC: 0.5 MG/DL — SIGNIFICANT CHANGE UP (ref 0.2–1.2)
BUN SERPL-MCNC: 37 MG/DL — HIGH (ref 10–20)
BUN SERPL-MCNC: 38 MG/DL — HIGH (ref 10–20)
CALCIUM SERPL-MCNC: 8.9 MG/DL — SIGNIFICANT CHANGE UP (ref 8.5–10.1)
CALCIUM SERPL-MCNC: 9.5 MG/DL — SIGNIFICANT CHANGE UP (ref 8.5–10.1)
CHLORIDE SERPL-SCNC: 105 MMOL/L — SIGNIFICANT CHANGE UP (ref 98–110)
CHLORIDE SERPL-SCNC: 106 MMOL/L — SIGNIFICANT CHANGE UP (ref 98–110)
CO2 SERPL-SCNC: 19 MMOL/L — SIGNIFICANT CHANGE UP (ref 17–32)
CO2 SERPL-SCNC: 20 MMOL/L — SIGNIFICANT CHANGE UP (ref 17–32)
CREAT SERPL-MCNC: 1.2 MG/DL — SIGNIFICANT CHANGE UP (ref 0.7–1.5)
CREAT SERPL-MCNC: 1.4 MG/DL — SIGNIFICANT CHANGE UP (ref 0.7–1.5)
EOSINOPHIL # BLD AUTO: 0.06 K/UL — SIGNIFICANT CHANGE UP (ref 0–0.7)
EOSINOPHIL # BLD AUTO: 0.07 K/UL — SIGNIFICANT CHANGE UP (ref 0–0.7)
EOSINOPHIL NFR BLD AUTO: 1.2 % — SIGNIFICANT CHANGE UP (ref 0–8)
EOSINOPHIL NFR BLD AUTO: 1.2 % — SIGNIFICANT CHANGE UP (ref 0–8)
GLUCOSE SERPL-MCNC: 102 MG/DL — HIGH (ref 70–99)
GLUCOSE SERPL-MCNC: 105 MG/DL — HIGH (ref 70–99)
HCT VFR BLD CALC: 39.6 % — LOW (ref 42–52)
HCT VFR BLD CALC: 44.8 % — SIGNIFICANT CHANGE UP (ref 42–52)
HGB BLD-MCNC: 13.4 G/DL — LOW (ref 14–18)
HGB BLD-MCNC: 15 G/DL — SIGNIFICANT CHANGE UP (ref 14–18)
HU1 AB SER-ACNC: NEGATIVE — SIGNIFICANT CHANGE UP
IMM GRANULOCYTES NFR BLD AUTO: 0.3 % — SIGNIFICANT CHANGE UP (ref 0.1–0.3)
IMM GRANULOCYTES NFR BLD AUTO: 0.6 % — HIGH (ref 0.1–0.3)
INR BLD: 1.11 RATIO — SIGNIFICANT CHANGE UP (ref 0.65–1.3)
LYMPHOCYTES # BLD AUTO: 1.6 K/UL — SIGNIFICANT CHANGE UP (ref 1.2–3.4)
LYMPHOCYTES # BLD AUTO: 2.19 K/UL — SIGNIFICANT CHANGE UP (ref 1.2–3.4)
LYMPHOCYTES # BLD AUTO: 31.3 % — SIGNIFICANT CHANGE UP (ref 20.5–51.1)
LYMPHOCYTES # BLD AUTO: 37.2 % — SIGNIFICANT CHANGE UP (ref 20.5–51.1)
MAGNESIUM SERPL-MCNC: 2 MG/DL — SIGNIFICANT CHANGE UP (ref 1.8–2.4)
MCHC RBC-ENTMCNC: 29.9 PG — SIGNIFICANT CHANGE UP (ref 27–31)
MCHC RBC-ENTMCNC: 30.2 PG — SIGNIFICANT CHANGE UP (ref 27–31)
MCHC RBC-ENTMCNC: 33.5 G/DL — SIGNIFICANT CHANGE UP (ref 32–37)
MCHC RBC-ENTMCNC: 33.8 G/DL — SIGNIFICANT CHANGE UP (ref 32–37)
MCV RBC AUTO: 89.2 FL — SIGNIFICANT CHANGE UP (ref 80–94)
MCV RBC AUTO: 89.4 FL — SIGNIFICANT CHANGE UP (ref 80–94)
MONOCYTES # BLD AUTO: 0.45 K/UL — SIGNIFICANT CHANGE UP (ref 0.1–0.6)
MONOCYTES # BLD AUTO: 0.46 K/UL — SIGNIFICANT CHANGE UP (ref 0.1–0.6)
MONOCYTES NFR BLD AUTO: 7.8 % — SIGNIFICANT CHANGE UP (ref 1.7–9.3)
MONOCYTES NFR BLD AUTO: 8.8 % — SIGNIFICANT CHANGE UP (ref 1.7–9.3)
NEUTROPHILS # BLD AUTO: 2.93 K/UL — SIGNIFICANT CHANGE UP (ref 1.4–6.5)
NEUTROPHILS # BLD AUTO: 3.07 K/UL — SIGNIFICANT CHANGE UP (ref 1.4–6.5)
NEUTROPHILS NFR BLD AUTO: 52.3 % — SIGNIFICANT CHANGE UP (ref 42.2–75.2)
NEUTROPHILS NFR BLD AUTO: 57.1 % — SIGNIFICANT CHANGE UP (ref 42.2–75.2)
PLATELET # BLD AUTO: 192 K/UL — SIGNIFICANT CHANGE UP (ref 130–400)
PLATELET # BLD AUTO: 226 K/UL — SIGNIFICANT CHANGE UP (ref 130–400)
POTASSIUM SERPL-MCNC: 4.2 MMOL/L — SIGNIFICANT CHANGE UP (ref 3.5–5)
POTASSIUM SERPL-MCNC: 4.5 MMOL/L — SIGNIFICANT CHANGE UP (ref 3.5–5)
POTASSIUM SERPL-SCNC: 4.2 MMOL/L — SIGNIFICANT CHANGE UP (ref 3.5–5)
POTASSIUM SERPL-SCNC: 4.5 MMOL/L — SIGNIFICANT CHANGE UP (ref 3.5–5)
PROT SERPL-MCNC: 6.7 G/DL — SIGNIFICANT CHANGE UP (ref 6–8)
PROT SERPL-MCNC: 7.7 G/DL — SIGNIFICANT CHANGE UP (ref 6–8)
PROTHROM AB SERPL-ACNC: 12 SEC — SIGNIFICANT CHANGE UP (ref 9.95–12.87)
RBC # BLD: 4.44 M/UL — LOW (ref 4.7–6.1)
RBC # BLD: 5.01 M/UL — SIGNIFICANT CHANGE UP (ref 4.7–6.1)
RBC # FLD: 14.2 % — SIGNIFICANT CHANGE UP (ref 11.5–14.5)
RBC # FLD: 14.4 % — SIGNIFICANT CHANGE UP (ref 11.5–14.5)
SODIUM SERPL-SCNC: 140 MMOL/L — SIGNIFICANT CHANGE UP (ref 135–146)
SODIUM SERPL-SCNC: 140 MMOL/L — SIGNIFICANT CHANGE UP (ref 135–146)
WBC # BLD: 5.12 K/UL — SIGNIFICANT CHANGE UP (ref 4.8–10.8)
WBC # BLD: 5.88 K/UL — SIGNIFICANT CHANGE UP (ref 4.8–10.8)
WBC # FLD AUTO: 5.12 K/UL — SIGNIFICANT CHANGE UP (ref 4.8–10.8)
WBC # FLD AUTO: 5.88 K/UL — SIGNIFICANT CHANGE UP (ref 4.8–10.8)

## 2018-05-18 RX ADMIN — PANTOPRAZOLE SODIUM 40 MILLIGRAM(S): 20 TABLET, DELAYED RELEASE ORAL at 05:53

## 2018-05-18 RX ADMIN — Medication 100 MILLIGRAM(S): at 12:07

## 2018-05-18 RX ADMIN — Medication 4 MILLIGRAM(S): at 12:07

## 2018-05-18 RX ADMIN — SENNA PLUS 2 TABLET(S): 8.6 TABLET ORAL at 22:11

## 2018-05-18 RX ADMIN — Medication 4 MILLIGRAM(S): at 05:51

## 2018-05-18 RX ADMIN — Medication 4 MILLIGRAM(S): at 17:45

## 2018-05-18 RX ADMIN — Medication 100 MILLIGRAM(S): at 22:10

## 2018-05-18 RX ADMIN — Medication 4 MILLIGRAM(S): at 23:10

## 2018-05-18 RX ADMIN — Medication 100 MILLIGRAM(S): at 05:52

## 2018-05-18 RX ADMIN — EFAVIRENZ, EMTRICITABINE AND TENOFOVIR DISOPROXIL FUMARATE 1 TABLET(S): 600; 200; 300 TABLET, FILM COATED ORAL at 22:11

## 2018-05-18 RX ADMIN — Medication 40 MILLIGRAM(S): at 12:07

## 2018-05-18 NOTE — PROGRESS NOTE ADULT - SUBJECTIVE AND OBJECTIVE BOX
Name: TOMY ESPINOSA  Age: 77y  Gender: Male    Pt was seen and examined.   feel same, no complaints    Allergies:  No Known Allergies      PHYSICAL EXAM:    Vitals:  T(C): 35.8 (05-18-18 @ 15:53), Max: 36.3 (05-18-18 @ 07:36)  HR: 60 (05-18-18 @ 15:53) (52 - 60)  BP: 125/63 (05-18-18 @ 15:53) (123/82 - 130/59)  RR: 18 (05-18-18 @ 15:53) (18 - 18)  SpO2: --  Wt(kg): --Vital Signs Last 24 Hrs  T(C): 35.8 (18 May 2018 15:53), Max: 36.3 (18 May 2018 07:36)  T(F): 96.4 (18 May 2018 15:45), Max: 97.4 (18 May 2018 07:36)  HR: 60 (18 May 2018 15:53) (52 - 60)  BP: 125/63 (18 May 2018 15:53) (123/82 - 130/59)  BP(mean): --  RR: 18 (18 May 2018 15:53) (18 - 18)  SpO2: --      NECK: Supple, No JVD  CHEST/LUNG: CTA, B/L, No rales, rhonchi, wheezing  HEART: S1,S2, N1 Regular rate and rhythm; No murmurs, rubs, or gallops  ABDOMEN: Soft, Nontender, Nondistended; Bowel sounds present  EXTREMITIES:  2+ Peripheral Pulses, No clubbing, cyanosis, or edema      LABS:                        15.0   5.88  )-----------( 226      ( 18 May 2018 07:19 )             44.8     05-18    140  |  105  |  38<H>  ----------------------------<  105<H>  4.5   |  20  |  1.4    Ca    9.5      18 May 2018 07:19    TPro  7.7  /  Alb  4.5  /  TBili  0.5  /  DBili  x   /  AST  33  /  ALT  55<H>  /  AlkPhos  77  05-18    LIVER FUNCTIONS - ( 18 May 2018 07:19 )  Alb: 4.5 g/dL / Pro: 7.7 g/dL / ALK PHOS: 77 U/L / ALT: 55 U/L / AST: 33 U/L / GGT: x                 MEDICATIONS  (STANDING):  amantadine 100 milliGRAM(s) Oral daily  dexamethasone     Tablet 4 milliGRAM(s) Oral every 6 hours  docusate sodium 100 milliGRAM(s) Oral three times a day  efavirenz 600/emtricitabine 200/tenofovir 300mg 1 Tablet(s) Oral at bedtime  FLUoxetine 40 milliGRAM(s) Oral daily  pantoprazole    Tablet 40 milliGRAM(s) Oral before breakfast  senna 2 Tablet(s) Oral at bedtime        RADIOLOGY & ADDITIONAL TESTS:    Imaging Personally Reviewed:  [ ] YES  [ ] NO    A/P:    1.  Cervical spine severe stenosis    Frequent falls and imbalance/ Ataxia  likely secondary to severe c-spine stenosis, pt is agreeable to surgery,  The pt states that he has tried all possible options to feel better but everything has failed.  He wishes to proceed with surgery as he cannot live like this any longer due to his extremely limited   quality of life    2.  HIV positive for many years.  Pt has h/o prior HIV related diseases although currently asymptomatic    FROM MEDICAL AND CARDIAC STANDPOINT THE PT HAS NO CONTRAINDICATIONS FOR SURGERY  HE IS MEDICALLY OPTIMIZED AT THIS TIME AND MAY PROCEED AS SCHEDULED Name: TOMY ESPINOSA  Age: 77y  Gender: Male    Pt was seen and examined.   feel same, no complaints  he denies any recent chest pains, dyspnea, or dyspnea on exertion.  He can easily walk up to a mile  without any cardiopulmonary symptoms.    Allergies:  No Known Allergies      PHYSICAL EXAM:    Vitals:  T(C): 35.8 (05-18-18 @ 15:53), Max: 36.3 (05-18-18 @ 07:36)  HR: 60 (05-18-18 @ 15:53) (52 - 60)  BP: 125/63 (05-18-18 @ 15:53) (123/82 - 130/59)  RR: 18 (05-18-18 @ 15:53) (18 - 18)  SpO2: --  Wt(kg): --Vital Signs Last 24 Hrs  T(C): 35.8 (18 May 2018 15:53), Max: 36.3 (18 May 2018 07:36)  T(F): 96.4 (18 May 2018 15:45), Max: 97.4 (18 May 2018 07:36)  HR: 60 (18 May 2018 15:53) (52 - 60)  BP: 125/63 (18 May 2018 15:53) (123/82 - 130/59)  BP(mean): --  RR: 18 (18 May 2018 15:53) (18 - 18)  SpO2: --      NECK: Supple, No JVD  CHEST/LUNG: CTA, B/L, No rales, rhonchi, wheezing  HEART: S1,S2, N1 Regular rate and rhythm; No murmurs, rubs, or gallops  ABDOMEN: Soft, Nontender, Nondistended; Bowel sounds present  EXTREMITIES:  2+ Peripheral Pulses, No clubbing, cyanosis, or edema      LABS:                        15.0   5.88  )-----------( 226      ( 18 May 2018 07:19 )             44.8     05-18    140  |  105  |  38<H>  ----------------------------<  105<H>  4.5   |  20  |  1.4    Ca    9.5      18 May 2018 07:19    TPro  7.7  /  Alb  4.5  /  TBili  0.5  /  DBili  x   /  AST  33  /  ALT  55<H>  /  AlkPhos  77  05-18    LIVER FUNCTIONS - ( 18 May 2018 07:19 )  Alb: 4.5 g/dL / Pro: 7.7 g/dL / ALK PHOS: 77 U/L / ALT: 55 U/L / AST: 33 U/L / GGT: x                 MEDICATIONS  (STANDING):  amantadine 100 milliGRAM(s) Oral daily  dexamethasone     Tablet 4 milliGRAM(s) Oral every 6 hours  docusate sodium 100 milliGRAM(s) Oral three times a day  efavirenz 600/emtricitabine 200/tenofovir 300mg 1 Tablet(s) Oral at bedtime  FLUoxetine 40 milliGRAM(s) Oral daily  pantoprazole    Tablet 40 milliGRAM(s) Oral before breakfast  senna 2 Tablet(s) Oral at bedtime    EKG 5/8/18 - SB@53bpm, LAD, LAFB,      RADIOLOGY & ADDITIONAL TESTS:    Imaging Personally Reviewed:  [ ] YES  [ ] NO    A/P:    1.  Cervical spine severe stenosis    Frequent falls and imbalance/ Ataxia  likely secondary to severe c-spine stenosis, pt is agreeable to surgery,  The pt states that he has tried all possible options to feel better but everything has failed.  He wishes to proceed with surgery as he cannot live like this any longer due to his extremely limited   quality of life.    2.  HIV positive for many years.  Pt has h/o prior HIV related diseases although currently asymptomatic          Viral load currently undetectable    FROM MEDICAL AND CARDIAC STANDPOINT THE PT HAS NO CONTRAINDICATIONS FOR SURGERY  HE IS MEDICALLY OPTIMIZED AT THIS TIME AND MAY PROCEED AS SCHEDULED Name: TOMY ESPINOSA  Age: 77y  Gender: Male    Pt was seen and examined.   feel same, no complaints  he denies any recent chest pains, dyspnea, or dyspnea on exertion.  He can easily walk up to a mile  without any cardiopulmonary symptoms.    Allergies:  No Known Allergies      PHYSICAL EXAM:    Vitals:  T(C): 35.8 (05-18-18 @ 15:53), Max: 36.3 (05-18-18 @ 07:36)  HR: 60 (05-18-18 @ 15:53) (52 - 60)  BP: 125/63 (05-18-18 @ 15:53) (123/82 - 130/59)  RR: 18 (05-18-18 @ 15:53) (18 - 18)  SpO2: --  Wt(kg): --Vital Signs Last 24 Hrs  T(C): 35.8 (18 May 2018 15:53), Max: 36.3 (18 May 2018 07:36)  T(F): 96.4 (18 May 2018 15:45), Max: 97.4 (18 May 2018 07:36)  HR: 60 (18 May 2018 15:53) (52 - 60)  BP: 125/63 (18 May 2018 15:53) (123/82 - 130/59)  BP(mean): --  RR: 18 (18 May 2018 15:53) (18 - 18)  SpO2: --      NECK: Supple, No JVD  CHEST/LUNG: CTA, B/L, No rales, rhonchi, wheezing  HEART: S1,S2, N1 Regular rate and rhythm; No murmurs, rubs, or gallops  ABDOMEN: Soft, Nontender, Nondistended; Bowel sounds present  EXTREMITIES:  2+ Peripheral Pulses, No clubbing, cyanosis, or edema      LABS:                        15.0   5.88  )-----------( 226      ( 18 May 2018 07:19 )             44.8     05-18    140  |  105  |  38<H>  ----------------------------<  105<H>  4.5   |  20  |  1.4    Ca    9.5      18 May 2018 07:19    TPro  7.7  /  Alb  4.5  /  TBili  0.5  /  DBili  x   /  AST  33  /  ALT  55<H>  /  AlkPhos  77  05-18    LIVER FUNCTIONS - ( 18 May 2018 07:19 )  Alb: 4.5 g/dL / Pro: 7.7 g/dL / ALK PHOS: 77 U/L / ALT: 55 U/L / AST: 33 U/L / GGT: x                 MEDICATIONS  (STANDING):  amantadine 100 milliGRAM(s) Oral daily  dexamethasone     Tablet 4 milliGRAM(s) Oral every 6 hours  docusate sodium 100 milliGRAM(s) Oral three times a day  efavirenz 600/emtricitabine 200/tenofovir 300mg 1 Tablet(s) Oral at bedtime  FLUoxetine 40 milliGRAM(s) Oral daily  pantoprazole    Tablet 40 milliGRAM(s) Oral before breakfast  senna 2 Tablet(s) Oral at bedtime    EKG 5/8/18 - SB@53bpm, LAD, LAFB,      CXR from 5/18 noted, ?ascending aorta enlarged but the CXR from 5/8/18 reveals no such findings.                   I doubt the ascending aorta has progressed to a signif aneurysm in the last 10 days so will not order any w/u at this time.    RADIOLOGY & ADDITIONAL TESTS:    Imaging Personally Reviewed:  [x ] YES  [ ] NO    A/P:    1.  Cervical spine severe stenosis    Frequent falls and imbalance/ Ataxia  likely secondary to severe c-spine stenosis, pt is agreeable to surgery,  The pt states that he has tried all possible options to feel better but everything has failed.  He wishes to proceed with surgery as he cannot live like this any longer due to his extremely limited   quality of life.    2.  HIV positive for many years.  Pt has h/o prior HIV related diseases although currently asymptomatic          Viral load currently undetectable    FROM MEDICAL AND CARDIAC STANDPOINT THE PT HAS NO CONTRAINDICATIONS FOR SURGERY  HE IS MEDICALLY OPTIMIZED AT THIS TIME AND MAY PROCEED AS SCHEDULED

## 2018-05-18 NOTE — PROGRESS NOTE ADULT - SUBJECTIVE AND OBJECTIVE BOX
SUBJECTIVE:    Patient is a 77y old Male who presents with a chief complaint of frequent Falls and imbalance (08 May 2018 13:49)    Currently admitted to medicine with the primary diagnosis of Cord compression     Today is hospital day 10d. This morning he is resting comfortably in bed and reports no new issues or overnight events.     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     ALLERGIES:  No Known Allergies    MEDICATIONS:  STANDING MEDICATIONS  amantadine 100 milliGRAM(s) Oral daily  dexamethasone     Tablet 4 milliGRAM(s) Oral every 6 hours  docusate sodium 100 milliGRAM(s) Oral three times a day  efavirenz 600/emtricitabine 200/tenofovir 300mg 1 Tablet(s) Oral at bedtime  enoxaparin Injectable 30 milliGRAM(s) SubCutaneous every 24 hours  FLUoxetine 40 milliGRAM(s) Oral daily  pantoprazole    Tablet 40 milliGRAM(s) Oral before breakfast  senna 2 Tablet(s) Oral at bedtime    PRN MEDICATIONS  ondansetron    Tablet 4 milliGRAM(s) Oral every 8 hours PRN  polyethylene glycol 3350 17 Gram(s) Oral daily PRN    VITALS:   T(F): 97.4  HR: 52  BP: 123/82  RR: 18  SpO2: --    LABS:                        15.0   5.88  )-----------( 226      ( 18 May 2018 07:19 )             44.8     05-18    140  |  105  |  38<H>  ----------------------------<  105<H>  4.5   |  20  |  1.4    Ca    9.5      18 May 2018 07:19    TPro  7.7  /  Alb  4.5  /  TBili  0.5  /  DBili  x   /  AST  33  /  ALT  55<H>  /  AlkPhos  77  05-18                  RADIOLOGY:    MR Head w/wo IV Cont (05.09.18 @ 11:00)  No evidence of acute intracranial pathology. Mild parenchymal volume loss and chronic microvascular ischemic changes.    MR Cervical Spine w/wo IV Cont (05.15.18 @ 18:18) >  Severe multilevel degenerative changes again seen. Mild cord compression   and mild cord signal abnormality C4-5 and C5-6 as described above.    CT Cervical Spine No Cont (05.16.18 @ 16:57) >  Severe cervical spine degenerative changes with severe spinal stenosis   C3-4, C4-5, C5-6 and C6-7 and multilevel moderate foraminal narrowing.   Associated spinal cord compression better appreciated on the MRI from the  prior day.    PHYSICAL EXAM:  GENERAL: anxious, thin, AAOx3, resting tremor present  HEENT:  Atraumatic, Normocephalic. EOMI, PERRLA, conjunctiva and sclera clear, poor dentition  PULMONARY: breath sounds present bilaterally  CARDIOVASCULAR: RRR, S1, S2  GASTROINTESTINAL: Soft, Nontender, Nondistended; Bowel sounds present  MUSCULOSKELETAL:  2+ Peripheral Pulses, No clubbing, cyanosis, or edema  NEUROLOGY: unsteady gate otherwise non focal

## 2018-05-18 NOTE — PROGRESS NOTE ADULT - ASSESSMENT
76 y/o male with hx of HIV, on HAART, benign stomach mass, depression currently p/w rapidly progressive acute ataxia with unilateral tremors w/ diffuse hyperreflexia found with cervical myelopathy secondary spinal stenosis with cord compression.    Plan:  - Neurosurgery evaluation  - f/u b/w  - check post-void residual
77 year old male with pertinent medical history of HIV on HAART and depression presented with frequent falls and imbalance.      #Frequent falls and imbalance/ Ataxia  likely secondary to severe c-spine stenosis, pt is agreeable to surgery,  repeated MRI of cervical spine w and wo iv contrast as per neurosurgery request  requested for CD4/CD8  requested for ACE, B12, folate, Lyme, toxo Ag, SPEP, UPEP, serum immunofixation, anti-Hu ab, anti-Yo ab as per PMD request  PT evaluation requested
Patient is a 77y old Male who presents with a chief complaint of frequent Falls and imbalance. Currently admitted to medicine with the primary diagnosis of cervical spinal stenosis.    1. Frequent Falls and Gait imbalance   - Neuro following  - MRI Brain and C-Spine completed showing cord compression and spinal canal stenosis  - Rehab eval  - Fall precautions  - NeuroSx eval pending for Dr. Goins     2. HIV on HAART  - Continue current HAART therapy
TOMY ESPINOSA 77y Male  MRN#: [id number]   CODE STATUS: Full Code      SUBJECTIVE  Patient is a 77y old Male who presents with a chief complaint of frequent Falls and imbalance.  Currently admitted to medicine with the primary diagnosis of Cord compression of cervical spine.  Today is hospital day 3d, and this morning he is feeling ok and reports no overnight events.   Spoke with the patient's son, Jeovanny, on the phone this morning at about 8am to discuss the patient's plan of care. He is interested in his father possibly getting surgery, the patient seems to defer decisions to his son. I also spoke with [facility] and gave them Jeovanny's number. They said they would call him and discuss the risks and benefits of the surgery.    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      ALLERGIES:  No Known Allergies      MEDICATIONS:  STANDING MEDICATIONS  amantadine 100 milliGRAM(s) Oral daily  efavirenz 600/emtricitabine 200/tenofovir 300mg 1 Tablet(s) Oral at bedtime  enoxaparin Injectable 30 milliGRAM(s) SubCutaneous every 24 hours  FLUoxetine 40 milliGRAM(s) Oral daily  pantoprazole    Tablet 40 milliGRAM(s) Oral before breakfast    PRN MEDICATIONS  ondansetron    Tablet 4 milliGRAM(s) Oral every 8 hours PRN  oxyCODONE    5 mG/acetaminophen 325 mG 1 Tablet(s) Oral every 6 hours PRN      VITAL SIGNS: Last 24 Hours  T(C): 36.5 (11 May 2018 08:29), Max: 36.5 (11 May 2018 08:29)  T(F): 97.7 (11 May 2018 08:29), Max: 97.7 (11 May 2018 08:29)  HR: 57 (11 May 2018 08:29) (54 - 57)  BP: 162/86 (11 May 2018 08:29) (137/72 - 162/86)  BP(mean): --  RR: 20 (11 May 2018 08:29) (18 - 20)  SpO2: 100% (11 May 2018 08:29) (98% - 100%)      LABS:                        15.0   5.22  )-----------( 148      ( 11 May 2018 09:40 )             44.4     RADIOLOGY:   MR Cervical Spine w/wo IV Cont (05.09.18 @ 11:00)  1. Significantly limited study secondary to motion artifact.    2. Cord compression and severe spinal canal stenosis C3-C4, C4-C5 and   C5-C6 secondary to chronic degenerative changes. Evaluation of cord   signal is limited.    PHYSICAL EXAM:    GENERAL: NAD, well-developed, AAOx3, resting tremor, worse on intention, odd affect  HEENT:  Atraumatic, Normocephalic. EOMI, PERRLA, conjunctiva and sclera clear, No JVD  PULMONARY: Clear to auscultation bilaterally; No wheeze  CARDIOVASCULAR: Regular rate and rhythm; No murmurs, rubs, or gallops  GASTROINTESTINAL: Soft, Nontender, Nondistended; Bowel sounds present  MUSCULOSKELETAL:  2+ Peripheral Pulses, No clubbing, cyanosis, or edema  NEUROLOGY: unsteady gait  SKIN: No rashes or lesions      ASSESSMENT  Patient is a 77y old Male who presents with a chief complaint of frequent Falls and imbalance.  Currently admitted to medicine with the primary diagnosis of Cord compression of cervical spine.  Hospital course has been complicated by decision of whether or not to pursue neurosurgical intervention.     Present today:  ( ) Acute on Chronic Decompensated Congestive Heart Failure, Yes? ( )Systolic / ( )Diastolic  ( ) Complicated Pneumonia  ( ) Morbid Obesity, Yes? BMI:  ( ) Functional Paraplegia  ( ) Delirium / ( ) Encephalopathy  (X ) Discussion with patient and/or family regarding goals of care, Yes? Time Spent: 15min      PLAN  1. Cervical spine cord compression and severe spinal stenosis  - Neurosurgical evaluation, and to discuss with patient and his son regarding further Neurosurgical evaluation  - Patient stable from when he came in  - Neuro follow up    2. HIV - continue HAART therapy    # CODE STATUS: FULL    # Planned Disposition: Home
77 year old male with pertinent medical history of HIV on HAART and depression presented with frequent falls and imbalance.      #Frequent falls  -Patient has imbalance and ataxia likely secondary to severe c-spine stenosis with cord compression  -Neurosurgery informed about the radiological results of cervical spine results and they are following the patient for a possible surgical intervention  -High Vitamin B-12 with normal folate level  -Toxoplasma Ig G was positive with Ig M being negative, negative results for lyme, normal SPEP on 8th May, 2018 and normal ACE levels   -CD4-419 and CD8-537 ratio: 0.8,  HIV medication  -Slightly elevated LFT's      He comes from home, is full code and is on both GI and DVT prophyalaxis
77 year old male with pertinent medical history of HIV on HAART and depression presented with frequent falls and imbalance.      #Frequent falls  Patient has imbalance and ataxia likely secondary to severe c-spine stenosis with mild cord compression, he is agreeable to surgery.  Neurosurgery informed about the MRI of cervical spine results and Dr. Arvizu will follow up as per their PA  High Vitamin B-12 with normal folate level  Toxoplasma Ig G was positive with Ig M being negative, negative results for lyme, normal SPEP on 8th May, 2018 and normal ACE levels   CD4-419 and CD8-537 ratio: 0.8, pharmacy approved the HIV medication and he will get it toay  Slightly elevated LFT's  Patient was seen by rehab, they have not given any recommendations yet    He comes from home, is full code and is on both GI and DVT prophyalaxis
77 year old male with pertinent medical history of HIV on HAART and depression presented with frequent falls and imbalance.      #Frequent falls  Patient has imbalance and ataxia likely secondary to severe c-spine stenosis, pt is agreeable to surgery.  He is going for a repeated MRI of cervical spine w and wo iv contrast under anaesthesia today afternoon as per neurosurgery request  We will follow up with neurosurgery once the the MRI is done  High Vitamin B-12 with normal folate level  Toxoplasma Ig G was positive with Ig M being negative, negative results for lyme, normal SPEP on 8th May, 2018 and normal ACE levels   CD4-419 and CD8-537 ratio: 0.8  Slightly elevated LFT's  anti-Hu ab and anti-Yo ab still pending  Patient was seen by rehab, will follow up with their recommendations    He comes from home, is full code and is on both GI and DVT prophyalaxis
77 year old male with pertinent medical history of HIV on HAART and depression presented with frequent falls and imbalance.      #Frequent falls and imbalance/ Ataxia  likely secondary to severe c-spine stenosis, pt is agreeable to surgery,  repeated MRI of cervical spine w and wo iv contrast as per neurosurgery request, patient needs MRI with anaesthesia as he was not able tolerate the repeat MRI even with the help of ativan.  High Vitamin B-12 with normal folate level, Toxoplasma Ig G was positive with Ig M being negative, negative results for lyme, normal SPEP on 8th May, 2018 and normal ACE levels   requested for CD4-419 and CD8-537 ratio: 0.8  anti-Hu ab and anti-Yo ab pending  Patient was seen by rehab yet no recommendations were made
77 year old male with pertinent medical history of HIV on HAART and depression presented with frequent falls and imbalance.    #Frequent falls  -Ataxia likely secondary to severe c-spine stenosis with cord compression  -Neurosurgery are going to take him for a procedure tomorrow and all the pre-op labs and xray is ordered for today  -High Vitamin B-12 with normal folate level  -Toxoplasma Ig G was positive with Ig M being negative, negative results for lyme, normal SPEP on 8th May, 2018 and normal ACE levels   -CD4-419 and CD8-537 ratio: 0.8, on HIV medications  -He comes from home, is full code and is on both GI and DVT prophyalaxis
Patient is a 77y old Male who presents with a chief complaint of frequent Falls and imbalance. Currently admitted to medicine with the primary diagnosis of frequent falls, rule out insidious causes.     1. Frequent Falls and Gait imbalance   - Neuro following  - MRI Brain and C-Spine w/&w/o gado pending  - ACE, B12, folate, Lyme, toxo Ag, SPEP, UPEP, serum immunofixation, anti-Hu ab, anti-Yo ab, anti-Tr ab, anti-Ri abs all pending for work up of insidious causes  - Rehab eval  - Fall precautions    2. HIV on HAART  - Check cd4 count and viral load, continue current HAART therapy

## 2018-05-18 NOTE — PRE-ANESTHESIA EVALUATION ADULT - NSANTHPEFT_GEN_ALL_CORE
Lungs clear bilaterally, denies SOB.  Heart S1S2. Denies chest pain.  abdomen soft and non tender  No edema, pulse palpable upper and lower extremeties

## 2018-05-19 LAB
ABO RH CONFIRMATION: SIGNIFICANT CHANGE UP
TYPE + AB SCN PNL BLD: SIGNIFICANT CHANGE UP

## 2018-05-19 PROCEDURE — 20936 SP BONE AGRFT LOCAL ADD-ON: CPT

## 2018-05-19 PROCEDURE — 22600 ARTHRD PST TQ 1NTRSPC CRV: CPT | Mod: 82

## 2018-05-19 PROCEDURE — 63045 LAM FACETEC & FORAMOT CRV: CPT

## 2018-05-19 PROCEDURE — 22842 INSERT SPINE FIXATION DEVICE: CPT

## 2018-05-19 PROCEDURE — 63045 LAM FACETEC & FORAMOT CRV: CPT | Mod: 82

## 2018-05-19 PROCEDURE — 63048 LAM FACETEC &FORAMOT EA ADDL: CPT

## 2018-05-19 PROCEDURE — 22614 ARTHRD PST TQ 1NTRSPC EA ADD: CPT | Mod: 82

## 2018-05-19 PROCEDURE — 22614 ARTHRD PST TQ 1NTRSPC EA ADD: CPT

## 2018-05-19 PROCEDURE — 20930 SP BONE ALGRFT MORSEL ADD-ON: CPT

## 2018-05-19 PROCEDURE — 22842 INSERT SPINE FIXATION DEVICE: CPT | Mod: 82

## 2018-05-19 PROCEDURE — 22600 ARTHRD PST TQ 1NTRSPC CRV: CPT

## 2018-05-19 PROCEDURE — 63048 LAM FACETEC &FORAMOT EA ADDL: CPT | Mod: 82

## 2018-05-19 RX ORDER — DEXAMETHASONE 0.5 MG/5ML
4 ELIXIR ORAL EVERY 6 HOURS
Qty: 0 | Refills: 0 | Status: DISCONTINUED | OUTPATIENT
Start: 2018-05-19 | End: 2018-05-22

## 2018-05-19 RX ORDER — SODIUM CHLORIDE 9 MG/ML
1000 INJECTION, SOLUTION INTRAVENOUS
Qty: 0 | Refills: 0 | Status: DISCONTINUED | OUTPATIENT
Start: 2018-05-19 | End: 2018-05-19

## 2018-05-19 RX ORDER — POLYETHYLENE GLYCOL 3350 17 G/17G
17 POWDER, FOR SOLUTION ORAL DAILY
Qty: 0 | Refills: 0 | Status: DISCONTINUED | OUTPATIENT
Start: 2018-05-19 | End: 2018-05-23

## 2018-05-19 RX ORDER — HYDROMORPHONE HYDROCHLORIDE 2 MG/ML
0.5 INJECTION INTRAMUSCULAR; INTRAVENOUS; SUBCUTANEOUS
Qty: 0 | Refills: 0 | Status: DISCONTINUED | OUTPATIENT
Start: 2018-05-19 | End: 2018-05-19

## 2018-05-19 RX ORDER — AMANTADINE HCL 100 MG
100 CAPSULE ORAL DAILY
Qty: 0 | Refills: 0 | Status: DISCONTINUED | OUTPATIENT
Start: 2018-05-19 | End: 2018-05-23

## 2018-05-19 RX ORDER — SENNA PLUS 8.6 MG/1
2 TABLET ORAL AT BEDTIME
Qty: 0 | Refills: 0 | Status: DISCONTINUED | OUTPATIENT
Start: 2018-05-19 | End: 2018-05-23

## 2018-05-19 RX ORDER — CEFAZOLIN SODIUM 1 G
1000 VIAL (EA) INJECTION EVERY 8 HOURS
Qty: 0 | Refills: 0 | Status: COMPLETED | OUTPATIENT
Start: 2018-05-19 | End: 2018-05-20

## 2018-05-19 RX ORDER — ONDANSETRON 8 MG/1
4 TABLET, FILM COATED ORAL ONCE
Qty: 0 | Refills: 0 | Status: DISCONTINUED | OUTPATIENT
Start: 2018-05-19 | End: 2018-05-19

## 2018-05-19 RX ORDER — PANTOPRAZOLE SODIUM 20 MG/1
40 TABLET, DELAYED RELEASE ORAL
Qty: 0 | Refills: 0 | Status: DISCONTINUED | OUTPATIENT
Start: 2018-05-19 | End: 2018-05-23

## 2018-05-19 RX ORDER — ONDANSETRON 8 MG/1
4 TABLET, FILM COATED ORAL EVERY 8 HOURS
Qty: 0 | Refills: 0 | Status: DISCONTINUED | OUTPATIENT
Start: 2018-05-19 | End: 2018-05-23

## 2018-05-19 RX ORDER — DOCUSATE SODIUM 100 MG
100 CAPSULE ORAL THREE TIMES A DAY
Qty: 0 | Refills: 0 | Status: DISCONTINUED | OUTPATIENT
Start: 2018-05-19 | End: 2018-05-23

## 2018-05-19 RX ORDER — SODIUM CHLORIDE 9 MG/ML
1000 INJECTION, SOLUTION INTRAVENOUS
Qty: 0 | Refills: 0 | Status: DISCONTINUED | OUTPATIENT
Start: 2018-05-19 | End: 2018-05-23

## 2018-05-19 RX ORDER — FLUOXETINE HCL 10 MG
40 CAPSULE ORAL DAILY
Qty: 0 | Refills: 0 | Status: DISCONTINUED | OUTPATIENT
Start: 2018-05-19 | End: 2018-05-23

## 2018-05-19 RX ORDER — EFAVIRENZ, EMTRICITABINE AND TENOFOVIR DISOPROXIL FUMARATE 600; 200; 300 MG/1; MG/1; MG/1
1 TABLET, FILM COATED ORAL AT BEDTIME
Qty: 0 | Refills: 0 | Status: DISCONTINUED | OUTPATIENT
Start: 2018-05-19 | End: 2018-05-23

## 2018-05-19 RX ORDER — MEPERIDINE HYDROCHLORIDE 50 MG/ML
12.5 INJECTION INTRAMUSCULAR; INTRAVENOUS; SUBCUTANEOUS
Qty: 0 | Refills: 0 | Status: DISCONTINUED | OUTPATIENT
Start: 2018-05-19 | End: 2018-05-19

## 2018-05-19 RX ORDER — HYDROMORPHONE HYDROCHLORIDE 2 MG/ML
30 INJECTION INTRAMUSCULAR; INTRAVENOUS; SUBCUTANEOUS
Qty: 0 | Refills: 0 | Status: DISCONTINUED | OUTPATIENT
Start: 2018-05-19 | End: 2018-05-20

## 2018-05-19 RX ADMIN — PANTOPRAZOLE SODIUM 40 MILLIGRAM(S): 20 TABLET, DELAYED RELEASE ORAL at 21:34

## 2018-05-19 RX ADMIN — Medication 100 MILLIGRAM(S): at 21:32

## 2018-05-19 RX ADMIN — SODIUM CHLORIDE 100 MILLILITER(S): 9 INJECTION, SOLUTION INTRAVENOUS at 13:20

## 2018-05-19 RX ADMIN — HYDROMORPHONE HYDROCHLORIDE 30 MILLILITER(S): 2 INJECTION INTRAMUSCULAR; INTRAVENOUS; SUBCUTANEOUS at 15:05

## 2018-05-19 RX ADMIN — SENNA PLUS 2 TABLET(S): 8.6 TABLET ORAL at 21:34

## 2018-05-19 RX ADMIN — HYDROMORPHONE HYDROCHLORIDE 0.5 MILLIGRAM(S): 2 INJECTION INTRAMUSCULAR; INTRAVENOUS; SUBCUTANEOUS at 14:42

## 2018-05-19 RX ADMIN — Medication 100 MILLIGRAM(S): at 21:34

## 2018-05-19 RX ADMIN — SODIUM CHLORIDE 75 MILLILITER(S): 9 INJECTION, SOLUTION INTRAVENOUS at 16:00

## 2018-05-19 RX ADMIN — Medication 100 MILLIGRAM(S): at 05:57

## 2018-05-19 RX ADMIN — Medication 40 MILLIGRAM(S): at 21:34

## 2018-05-19 RX ADMIN — Medication 4 MILLIGRAM(S): at 19:07

## 2018-05-19 RX ADMIN — Medication 4 MILLIGRAM(S): at 05:57

## 2018-05-19 RX ADMIN — PANTOPRAZOLE SODIUM 40 MILLIGRAM(S): 20 TABLET, DELAYED RELEASE ORAL at 05:57

## 2018-05-19 RX ADMIN — EFAVIRENZ, EMTRICITABINE AND TENOFOVIR DISOPROXIL FUMARATE 1 TABLET(S): 600; 200; 300 TABLET, FILM COATED ORAL at 21:35

## 2018-05-19 RX ADMIN — HYDROMORPHONE HYDROCHLORIDE 0.5 MILLIGRAM(S): 2 INJECTION INTRAMUSCULAR; INTRAVENOUS; SUBCUTANEOUS at 14:27

## 2018-05-19 NOTE — PROGRESS NOTE ADULT - SUBJECTIVE AND OBJECTIVE BOX
NEUROSURGERY POST OP NOTE:    S/P Cervical 3-6 Posterior decompression and stabilization  Pt seen and examined at bedside  Pt reposrts incisinal pain posterior neck spasm pain  denies radicular pain or parasthesia           T(C): 35.8 (05-19-18 @ 17:07), Max: 36.5 (05-19-18 @ 13:20)  HR: 55 (05-19-18 @ 17:07) (55 - 66)  BP: 140/66 (05-19-18 @ 17:07) (120/60 - 168/70)  RR: 20 (05-19-18 @ 17:07) (13 - 20)  SpO2: 99% (05-19-18 @ 16:30) (99% - 100%)        amantadine 100 milliGRAM(s) Oral daily  ceFAZolin   IVPB 1000 milliGRAM(s) IV Intermittent every 8 hours  dexamethasone     Tablet 4 milliGRAM(s) Oral every 6 hours  dextrose 5% + sodium chloride 0.45%. 1000 milliLiter(s) IV Continuous <Continuous>  docusate sodium 100 milliGRAM(s) Oral three times a day  efavirenz 600/emtricitabine 200/tenofovir 300mg 1 Tablet(s) Oral at bedtime  FLUoxetine 40 milliGRAM(s) Oral daily  HYDROmorphone PCA (1 mG/mL) 30 milliLiter(s) PCA Continuous PCA Continuous  ondansetron    Tablet 4 milliGRAM(s) Oral every 8 hours PRN  pantoprazole    Tablet 40 milliGRAM(s) Oral before breakfast  polyethylene glycol 3350 17 Gram(s) Oral daily PRN  senna 2 Tablet(s) Oral at bedtime      Exam:  A&Ox3 NAD speech clear  PERRLA EOMI  Cervical Soft Collar in place   Incision dressing with mild sataining  Hemovac in place draing frankl blood  Motor THOMAS x 4  Sensory grossly intact  (+) B/L Hoffmans        Assessment: S/P posterior cervical decompression and stabilization      Plan: Stable  Pain control-PCA  serial neuro checks  Monitor Hemovac  PT/Rehab  D/W attending

## 2018-05-19 NOTE — CHART NOTE - NSCHARTNOTEFT_GEN_A_CORE
PACU ANESTHESIA ADMISSION NOTE      Procedure: posterior cervical 3- cervical 6 decompression and fusion   Post op diagnosis:      ____  Intubated  TV:______       Rate: ______      FiO2: ______    _x___  Patent Airway    _x___  Full return of protective reflexes    _x___  Full recovery from anesthesia / back to baseline status    Vitals:  T(F): 97.7  HR: 72  BP: 140/59  RR: 15  SpO2: 99%    Mental Status:  _x___ Awake   __x___ Alert   _____ Drowsy   _____ Sedated    Nausea/Vomiting:  _x___  NO       ______Yes,   See Post - Op Orders         Pain Scale (0-10):  __0___    Treatment: ____ None    _x___ See Post - Op/PCA Orders    Post - Operative Fluids:   ____ Oral   _x___ See Post - Op Orders    Plan: Discharge:   ____Home       __x___Floor     _____Critical Care    _____  Other:_________________    Comments:  No anesthesia issues or complications noted.  Discharge when criteria met.    Report given to RN, all questions answered.

## 2018-05-19 NOTE — BRIEF OPERATIVE NOTE - PROCEDURE
<<-----Click on this checkbox to enter Procedure Cervical laminectomy with spinal fusion using bone graft  05/19/2018  C3-C6 laminectomy posterolateral fusion with instrumentation  Active  SMOTIVALA

## 2018-05-19 NOTE — BRIEF OPERATIVE NOTE - PRE-OP DX
Cervical myelopathy with cervical radiculopathy  05/19/2018    Active  Jermaine Arvizu  Cervical stenosis of spinal canal  05/19/2018    Active  Jermaine Arvizu

## 2018-05-19 NOTE — PRE-OP CHECKLIST - IV STARTED
----- Message from [email or web address] sent at 1/23/2017  3:54 AM CST -----  Regarding: Appointment Request  Contact: [phone]  Appointment Request From: Ryanne Mora    With Provider: Hailey Shannon yes

## 2018-05-20 LAB
ALBUMIN SERPL ELPH-MCNC: 3.6 G/DL — SIGNIFICANT CHANGE UP (ref 3.5–5.2)
ALP SERPL-CCNC: 55 U/L — SIGNIFICANT CHANGE UP (ref 30–115)
ALT FLD-CCNC: 40 U/L — SIGNIFICANT CHANGE UP (ref 0–41)
ANION GAP SERPL CALC-SCNC: 14 MMOL/L — SIGNIFICANT CHANGE UP (ref 7–14)
AST SERPL-CCNC: 27 U/L — SIGNIFICANT CHANGE UP (ref 0–41)
BASOPHILS # BLD AUTO: 0.03 K/UL — SIGNIFICANT CHANGE UP (ref 0–0.2)
BASOPHILS NFR BLD AUTO: 0.7 % — SIGNIFICANT CHANGE UP (ref 0–1)
BILIRUB SERPL-MCNC: 0.3 MG/DL — SIGNIFICANT CHANGE UP (ref 0.2–1.2)
BUN SERPL-MCNC: 20 MG/DL — SIGNIFICANT CHANGE UP (ref 10–20)
CALCIUM SERPL-MCNC: 8.2 MG/DL — LOW (ref 8.5–10.1)
CHLORIDE SERPL-SCNC: 105 MMOL/L — SIGNIFICANT CHANGE UP (ref 98–110)
CO2 SERPL-SCNC: 21 MMOL/L — SIGNIFICANT CHANGE UP (ref 17–32)
CREAT SERPL-MCNC: 1 MG/DL — SIGNIFICANT CHANGE UP (ref 0.7–1.5)
EOSINOPHIL # BLD AUTO: 0.03 K/UL — SIGNIFICANT CHANGE UP (ref 0–0.7)
EOSINOPHIL NFR BLD AUTO: 0.7 % — SIGNIFICANT CHANGE UP (ref 0–8)
GLUCOSE SERPL-MCNC: 84 MG/DL — SIGNIFICANT CHANGE UP (ref 70–99)
HCT VFR BLD CALC: 34.8 % — LOW (ref 42–52)
HGB BLD-MCNC: 11.4 G/DL — LOW (ref 14–18)
IMM GRANULOCYTES NFR BLD AUTO: 0.4 % — HIGH (ref 0.1–0.3)
LYMPHOCYTES # BLD AUTO: 1.33 K/UL — SIGNIFICANT CHANGE UP (ref 1.2–3.4)
LYMPHOCYTES # BLD AUTO: 29.4 % — SIGNIFICANT CHANGE UP (ref 20.5–51.1)
MCHC RBC-ENTMCNC: 29.7 PG — SIGNIFICANT CHANGE UP (ref 27–31)
MCHC RBC-ENTMCNC: 32.8 G/DL — SIGNIFICANT CHANGE UP (ref 32–37)
MCV RBC AUTO: 90.6 FL — SIGNIFICANT CHANGE UP (ref 80–94)
MONOCYTES # BLD AUTO: 0.44 K/UL — SIGNIFICANT CHANGE UP (ref 0.1–0.6)
MONOCYTES NFR BLD AUTO: 9.7 % — HIGH (ref 1.7–9.3)
NEUTROPHILS # BLD AUTO: 2.67 K/UL — SIGNIFICANT CHANGE UP (ref 1.4–6.5)
NEUTROPHILS NFR BLD AUTO: 59.1 % — SIGNIFICANT CHANGE UP (ref 42.2–75.2)
PLATELET # BLD AUTO: 142 K/UL — SIGNIFICANT CHANGE UP (ref 130–400)
POTASSIUM SERPL-MCNC: 3.8 MMOL/L — SIGNIFICANT CHANGE UP (ref 3.5–5)
POTASSIUM SERPL-SCNC: 3.8 MMOL/L — SIGNIFICANT CHANGE UP (ref 3.5–5)
PROT SERPL-MCNC: 5.3 G/DL — LOW (ref 6–8)
RBC # BLD: 3.84 M/UL — LOW (ref 4.7–6.1)
RBC # FLD: 14.3 % — SIGNIFICANT CHANGE UP (ref 11.5–14.5)
SODIUM SERPL-SCNC: 140 MMOL/L — SIGNIFICANT CHANGE UP (ref 135–146)
WBC # BLD: 4.52 K/UL — LOW (ref 4.8–10.8)
WBC # FLD AUTO: 4.52 K/UL — LOW (ref 4.8–10.8)

## 2018-05-20 RX ORDER — KETOROLAC TROMETHAMINE 30 MG/ML
15 SYRINGE (ML) INJECTION EVERY 6 HOURS
Qty: 0 | Refills: 0 | Status: DISCONTINUED | OUTPATIENT
Start: 2018-05-20 | End: 2018-05-23

## 2018-05-20 RX ORDER — DIPHENHYDRAMINE HCL 50 MG
25 CAPSULE ORAL EVERY 6 HOURS
Qty: 0 | Refills: 0 | Status: DISCONTINUED | OUTPATIENT
Start: 2018-05-20 | End: 2018-05-23

## 2018-05-20 RX ORDER — HYDROMORPHONE HYDROCHLORIDE 2 MG/ML
0.5 INJECTION INTRAMUSCULAR; INTRAVENOUS; SUBCUTANEOUS
Qty: 0 | Refills: 0 | Status: DISCONTINUED | OUTPATIENT
Start: 2018-05-20 | End: 2018-05-23

## 2018-05-20 RX ORDER — ONDANSETRON 8 MG/1
4 TABLET, FILM COATED ORAL EVERY 6 HOURS
Qty: 0 | Refills: 0 | Status: DISCONTINUED | OUTPATIENT
Start: 2018-05-20 | End: 2018-05-23

## 2018-05-20 RX ORDER — OXYCODONE AND ACETAMINOPHEN 5; 325 MG/1; MG/1
2 TABLET ORAL EVERY 4 HOURS
Qty: 0 | Refills: 0 | Status: DISCONTINUED | OUTPATIENT
Start: 2018-05-20 | End: 2018-05-23

## 2018-05-20 RX ORDER — OXYCODONE AND ACETAMINOPHEN 5; 325 MG/1; MG/1
1 TABLET ORAL EVERY 4 HOURS
Qty: 0 | Refills: 0 | Status: DISCONTINUED | OUTPATIENT
Start: 2018-05-20 | End: 2018-05-23

## 2018-05-20 RX ORDER — HEPARIN SODIUM 5000 [USP'U]/ML
5000 INJECTION INTRAVENOUS; SUBCUTANEOUS EVERY 8 HOURS
Qty: 0 | Refills: 0 | Status: DISCONTINUED | OUTPATIENT
Start: 2018-05-20 | End: 2018-05-23

## 2018-05-20 RX ADMIN — SENNA PLUS 2 TABLET(S): 8.6 TABLET ORAL at 21:54

## 2018-05-20 RX ADMIN — EFAVIRENZ, EMTRICITABINE AND TENOFOVIR DISOPROXIL FUMARATE 1 TABLET(S): 600; 200; 300 TABLET, FILM COATED ORAL at 21:53

## 2018-05-20 RX ADMIN — Medication 4 MILLIGRAM(S): at 06:51

## 2018-05-20 RX ADMIN — Medication 100 MILLIGRAM(S): at 14:35

## 2018-05-20 RX ADMIN — HEPARIN SODIUM 5000 UNIT(S): 5000 INJECTION INTRAVENOUS; SUBCUTANEOUS at 14:36

## 2018-05-20 RX ADMIN — Medication 4 MILLIGRAM(S): at 00:38

## 2018-05-20 RX ADMIN — Medication 100 MILLIGRAM(S): at 06:51

## 2018-05-20 RX ADMIN — SODIUM CHLORIDE 75 MILLILITER(S): 9 INJECTION, SOLUTION INTRAVENOUS at 03:00

## 2018-05-20 RX ADMIN — ONDANSETRON 4 MILLIGRAM(S): 8 TABLET, FILM COATED ORAL at 14:40

## 2018-05-20 RX ADMIN — Medication 4 MILLIGRAM(S): at 23:04

## 2018-05-20 RX ADMIN — Medication 100 MILLIGRAM(S): at 14:28

## 2018-05-20 RX ADMIN — ONDANSETRON 4 MILLIGRAM(S): 8 TABLET, FILM COATED ORAL at 22:45

## 2018-05-20 RX ADMIN — Medication 100 MILLIGRAM(S): at 06:53

## 2018-05-20 RX ADMIN — HEPARIN SODIUM 5000 UNIT(S): 5000 INJECTION INTRAVENOUS; SUBCUTANEOUS at 21:53

## 2018-05-20 RX ADMIN — Medication 40 MILLIGRAM(S): at 13:36

## 2018-05-20 RX ADMIN — Medication 4 MILLIGRAM(S): at 18:02

## 2018-05-20 RX ADMIN — PANTOPRAZOLE SODIUM 40 MILLIGRAM(S): 20 TABLET, DELAYED RELEASE ORAL at 06:51

## 2018-05-20 RX ADMIN — Medication 4 MILLIGRAM(S): at 13:39

## 2018-05-20 RX ADMIN — Medication 100 MILLIGRAM(S): at 14:34

## 2018-05-20 RX ADMIN — Medication 100 MILLIGRAM(S): at 21:53

## 2018-05-20 NOTE — PROGRESS NOTE ADULT - SUBJECTIVE AND OBJECTIVE BOX
Name: TOMY ESPINOSA  Age: 77y  Gender: Male    Pt was seen and examined 2 approx 7:35pm 5/19/2018  c/o: incisional neck pain but otherwise says feels good    Allergies:  No Known Allergies      PHYSICAL EXAM:    Vitals:  T(C): 36.6 (05-20-18 @ 16:14), Max: 36.8 (05-20-18 @ 11:00)  HR: 60 (05-20-18 @ 16:14) (56 - 63)  BP: 162/78 (05-20-18 @ 16:14) (119/58 - 162/78)  RR: 18 (05-20-18 @ 16:14) (18 - 20)  SpO2: --  Wt(kg): --Vital Signs Last 24 Hrs  T(C): 36.6 (20 May 2018 16:14), Max: 36.8 (20 May 2018 11:00)  T(F): 97.8 (20 May 2018 16:14), Max: 98.2 (20 May 2018 11:00)  HR: 60 (20 May 2018 16:14) (56 - 63)  BP: 162/78 (20 May 2018 16:14) (119/58 - 162/78)  BP(mean): --  RR: 18 (20 May 2018 16:14) (18 - 20)  SpO2: --      NECK: surgical wound/incision not examined  CHEST/LUNG: CTA, B/L, No rales, rhonchi, wheezing  HEART: S1,S2, N1 Regular rate and rhythm; No murmurs, rubs, or gallops  ABDOMEN: Soft, Nontender, Nondistended; Bowel sounds present  EXTREMITIES:  2+ Peripheral Pulses, No clubbing, cyanosis, or edema      LABS:                        11.4   4.52  )-----------( 142      ( 20 May 2018 08:09 )             34.8     05-20    140  |  105  |  20  ----------------------------<  84  3.8   |  21  |  1.0    Ca    8.2<L>      20 May 2018 08:09    TPro  5.3<L>  /  Alb  3.6  /  TBili  0.3  /  DBili  x   /  AST  27  /  ALT  40  /  AlkPhos  55  05-20    LIVER FUNCTIONS - ( 20 May 2018 08:09 )  Alb: 3.6 g/dL / Pro: 5.3 g/dL / ALK PHOS: 55 U/L / ALT: 40 U/L / AST: 27 U/L / GGT: x             MEDICATIONS  (STANDING):  amantadine 100 milliGRAM(s) Oral daily  dexamethasone     Tablet 4 milliGRAM(s) Oral every 6 hours  dextrose 5% + sodium chloride 0.45%. 1000 milliLiter(s) (75 mL/Hr) IV Continuous <Continuous>  docusate sodium 100 milliGRAM(s) Oral three times a day  efavirenz 600/emtricitabine 200/tenofovir 300mg 1 Tablet(s) Oral at bedtime  FLUoxetine 40 milliGRAM(s) Oral daily  heparin  Injectable 5000 Unit(s) SubCutaneous every 8 hours  pantoprazole    Tablet 40 milliGRAM(s) Oral before breakfast  senna 2 Tablet(s) Oral at bedtime        RADIOLOGY & ADDITIONAL TESTS:    Imaging Personally Reviewed:  [ ] YES  [ ] NO    1.  Cervical spine severe stenosis    s/p c-spine surgery earlier today  doing good x c/o pain      2.  HIV positive for many years.  Pt has h/o prior HIV related diseases although currently asymptomatic          Viral load currently undetectable    Overall pt doing well

## 2018-05-20 NOTE — PROGRESS NOTE ADULT - SUBJECTIVE AND OBJECTIVE BOX
POD # 1    S/P posterior Cervical 3-6 decompression and stabilization  Pt seen and examined at bedside  reports Incisional pain posterior neck spasm      Vital Signs Last 24 Hrs  T(C): 36.8 (20 May 2018 11:00), Max: 36.8 (20 May 2018 11:00)  T(F): 98.2 (20 May 2018 11:00), Max: 98.2 (20 May 2018 11:00)  HR: 59 (20 May 2018 11:00) (55 - 66)  BP: 139/64 (20 May 2018 11:00) (119/58 - 168/70)  BP(mean): --  RR: 18 (20 May 2018 11:00) (13 - 20)  SpO2: 99% (19 May 2018 16:30) (99% - 100%)    PHYSICAL EXAM:  A&Ox3 NAD speech clear  PERRLA EOMI  incision C/D/I  hemovac draining 160 cc  motor THOMAS x 4  sensory grossly intact  (+) Hoffmnan R>L          MEDICATIONS:  Antibiotics:  ceFAZolin   IVPB 1000 milliGRAM(s) IV Intermittent every 8 hours  efavirenz 600/emtricitabine 200/tenofovir 300mg 1 Tablet(s) Oral at bedtime    Neuro:  amantadine 100 milliGRAM(s) Oral daily  FLUoxetine 40 milliGRAM(s) Oral daily  HYDROmorphone PCA (1 mG/mL) 30 milliLiter(s) PCA Continuous PCA Continuous  ondansetron    Tablet 4 milliGRAM(s) Oral every 8 hours PRN    Anticoagulation:  heparin  Injectable 5000 Unit(s) SubCutaneous every 8 hours    OTHER:  dexamethasone     Tablet 4 milliGRAM(s) Oral every 6 hours  docusate sodium 100 milliGRAM(s) Oral three times a day  pantoprazole    Tablet 40 milliGRAM(s) Oral before breakfast  polyethylene glycol 3350 17 Gram(s) Oral daily PRN  senna 2 Tablet(s) Oral at bedtime    IVF:  dextrose 5% + sodium chloride 0.45%. 1000 milliLiter(s) IV Continuous <Continuous>        LABS:                        11.4   4.52  )-----------( 142      ( 20 May 2018 08:09 )             34.8     05-18    140  |  106  |  37<H>  ----------------------------<  102<H>  4.2   |  19  |  1.2    Ca    8.9      18 May 2018 20:54  Mg     2.0     05-18    TPro  6.7  /  Alb  4.1  /  TBili  0.4  /  DBili  x   /  AST  29  /  ALT  47<H>  /  AlkPhos  66  05-18    PT/INR - ( 18 May 2018 20:54 )   PT: 12.00 sec;   INR: 1.11 ratio         PTT - ( 18 May 2018 20:54 )  PTT:27.3 sec      Assessment: s/p Posterior cervical decompression and stabilization      Plan: Stable  Pain Control- cont PCA  D/C Alicea  PT/Oli  D/W attending  Cont soft collar in bed Hard Collar when ambulating  continue monitor HV

## 2018-05-21 LAB — PURKINJE CELLS AB CSF QL IB: NEGATIVE — SIGNIFICANT CHANGE UP

## 2018-05-21 RX ORDER — METOCLOPRAMIDE HCL 10 MG
10 TABLET ORAL EVERY 6 HOURS
Qty: 0 | Refills: 0 | Status: DISCONTINUED | OUTPATIENT
Start: 2018-05-21 | End: 2018-05-23

## 2018-05-21 RX ADMIN — HEPARIN SODIUM 5000 UNIT(S): 5000 INJECTION INTRAVENOUS; SUBCUTANEOUS at 22:22

## 2018-05-21 RX ADMIN — ONDANSETRON 4 MILLIGRAM(S): 8 TABLET, FILM COATED ORAL at 05:47

## 2018-05-21 RX ADMIN — Medication 4 MILLIGRAM(S): at 11:54

## 2018-05-21 RX ADMIN — EFAVIRENZ, EMTRICITABINE AND TENOFOVIR DISOPROXIL FUMARATE 1 TABLET(S): 600; 200; 300 TABLET, FILM COATED ORAL at 22:22

## 2018-05-21 RX ADMIN — Medication 15 MILLIGRAM(S): at 07:56

## 2018-05-21 RX ADMIN — HEPARIN SODIUM 5000 UNIT(S): 5000 INJECTION INTRAVENOUS; SUBCUTANEOUS at 06:30

## 2018-05-21 RX ADMIN — Medication 40 MILLIGRAM(S): at 11:54

## 2018-05-21 RX ADMIN — SODIUM CHLORIDE 75 MILLILITER(S): 9 INJECTION, SOLUTION INTRAVENOUS at 23:53

## 2018-05-21 RX ADMIN — Medication 100 MILLIGRAM(S): at 11:55

## 2018-05-21 RX ADMIN — Medication 10 MILLIGRAM(S): at 08:55

## 2018-05-21 RX ADMIN — Medication 4 MILLIGRAM(S): at 23:52

## 2018-05-21 RX ADMIN — HEPARIN SODIUM 5000 UNIT(S): 5000 INJECTION INTRAVENOUS; SUBCUTANEOUS at 15:31

## 2018-05-21 RX ADMIN — Medication 4 MILLIGRAM(S): at 17:57

## 2018-05-21 RX ADMIN — Medication 10 MILLIGRAM(S): at 15:32

## 2018-05-21 NOTE — PROGRESS NOTE ADULT - SUBJECTIVE AND OBJECTIVE BOX
Name: TOMY ESPINOSA  Age: 77y  Gender: Male    Pt was seen and examined.   c/o: neck pain, N+ V    Allergies:  No Known Allergies      PHYSICAL EXAM:    Vitals:  T(C): 35.5 (05-20-18 @ 23:25), Max: 36.8 (05-20-18 @ 11:00)  HR: 59 (05-20-18 @ 23:25) (56 - 60)  BP: 163/75 (05-20-18 @ 23:25) (119/58 - 163/75)  RR: 18 (05-20-18 @ 23:25) (18 - 18)  SpO2: --  Wt(kg): --Vital Signs Last 24 Hrs  T(C): 35.5 (20 May 2018 23:25), Max: 36.8 (20 May 2018 11:00)  T(F): 95.9 (20 May 2018 23:25), Max: 98.2 (20 May 2018 11:00)  HR: 59 (20 May 2018 23:25) (56 - 60)  BP: 163/75 (20 May 2018 23:25) (119/58 - 163/75)  BP(mean): --  RR: 18 (20 May 2018 23:25) (18 - 18)  SpO2: --      NECK: in soft collar, not examined  CHEST/LUNG: CTA, B/L, No rales, rhonchi, wheezing, or rubs  HEART: S1,S2, N1 Regular rate and rhythm; No murmurs, rubs, or gallops  ABDOMEN: Soft, Nontender, Nondistended; Bowel sounds present  EXTREMITIES:  2+ Peripheral Pulses, No clubbing, cyanosis, or edema      LABS:                        11.4   4.52  )-----------( 142      ( 20 May 2018 08:09 )             34.8     05-20    140  |  105  |  20  ----------------------------<  84  3.8   |  21  |  1.0    Ca    8.2<L>      20 May 2018 08:09    TPro  5.3<L>  /  Alb  3.6  /  TBili  0.3  /  DBili  x   /  AST  27  /  ALT  40  /  AlkPhos  55  05-20    LIVER FUNCTIONS - ( 20 May 2018 08:09 )  Alb: 3.6 g/dL / Pro: 5.3 g/dL / ALK PHOS: 55 U/L / ALT: 40 U/L / AST: 27 U/L / GGT: x                 MEDICATIONS  (STANDING):  amantadine 100 milliGRAM(s) Oral daily  dexamethasone     Tablet 4 milliGRAM(s) Oral every 6 hours  dextrose 5% + sodium chloride 0.45%. 1000 milliLiter(s) (75 mL/Hr) IV Continuous <Continuous>  docusate sodium 100 milliGRAM(s) Oral three times a day  efavirenz 600/emtricitabine 200/tenofovir 300mg 1 Tablet(s) Oral at bedtime  FLUoxetine 40 milliGRAM(s) Oral daily  heparin  Injectable 5000 Unit(s) SubCutaneous every 8 hours  pantoprazole    Tablet 40 milliGRAM(s) Oral before breakfast  senna 2 Tablet(s) Oral at bedtime        RADIOLOGY & ADDITIONAL TESTS:    Imaging Personally Reviewed:  [ ] YES  [ ] NO    A/P:  1.  Cervical spine severe stenosis    s/p c-spine surgery earlier today  doing good x c/o pain      2.  HIV positive for many years.  Pt has h/o prior HIV related diseases although currently asymptomatic          Viral load currently undetectable    Overall pt doing well  did have nausea and vomiting earlier,   will d/c morphine, toradol and zofran for now

## 2018-05-21 NOTE — PROGRESS NOTE ADULT - SUBJECTIVE AND OBJECTIVE BOX
POD # 2    S/P C3-6 Posterior Decompression and Fusion    Pt seen and examined at bedside. Pt c/o incisional pain at this time. Denies upper extremity radiculopathy. Sts some improvement in his pre-op hand / feet numbness.      Vital Signs Last 24 Hrs  T(C): 36.1 (21 May 2018 08:03), Max: 36.8 (20 May 2018 11:00)  T(F): 97 (21 May 2018 08:03), Max: 98.2 (20 May 2018 11:00)  HR: 71 (21 May 2018 08:03) (59 - 71)  BP: 180/81 (21 May 2018 08:03) (139/64 - 180/81)  BP(mean): --  RR: 20 (21 May 2018 08:03) (18 - 20)  SpO2: --  HVC 60cc / 24hrs    PHYSICAL EXAM:  Strength 5/5 distally, bi/tri, Left deltoid  uto L deltoid (TSR)  Sensation intact to light touch  +B/L hoffmans  Sensation intact to light touch  BB  2+ B/L  Dressing C/D/I    MEDICATIONS:  Antibiotics:  efavirenz 600/emtricitabine 200/tenofovir 300mg 1 Tablet(s) Oral at bedtime    Neuro:  amantadine 100 milliGRAM(s) Oral daily  diphenhydrAMINE   Capsule 25 milliGRAM(s) Oral every 6 hours PRN  FLUoxetine 40 milliGRAM(s) Oral daily  HYDROmorphone  Injectable 0.5 milliGRAM(s) IV Push every 3 hours PRN  ketorolac   Injectable 15 milliGRAM(s) IV Push every 6 hours PRN  metoclopramide Injectable 10 milliGRAM(s) IV Push every 6 hours PRN  ondansetron    Tablet 4 milliGRAM(s) Oral every 8 hours PRN  ondansetron Injectable 4 milliGRAM(s) IV Push every 6 hours PRN  oxyCODONE    5 mG/acetaminophen 325 mG 1 Tablet(s) Oral every 4 hours PRN  oxyCODONE    5 mG/acetaminophen 325 mG 2 Tablet(s) Oral every 4 hours PRN    Anticoagulation:  heparin  Injectable 5000 Unit(s) SubCutaneous every 8 hours    OTHER:  dexamethasone     Tablet 4 milliGRAM(s) Oral every 6 hours  docusate sodium 100 milliGRAM(s) Oral three times a day  pantoprazole    Tablet 40 milliGRAM(s) Oral before breakfast  polyethylene glycol 3350 17 Gram(s) Oral daily PRN  senna 2 Tablet(s) Oral at bedtime    IVF:  dextrose 5% + sodium chloride 0.45%. 1000 milliLiter(s) IV Continuous <Continuous>        LABS:                        11.4   4.52  )-----------( 142      ( 20 May 2018 08:09 )             34.8     05-20    140  |  105  |  20  ----------------------------<  84  3.8   |  21  |  1.0    Ca    8.2<L>      20 May 2018 08:09    TPro  5.3<L>  /  Alb  3.6  /  TBili  0.3  /  DBili  x   /  AST  27  /  ALT  40  /  AlkPhos  55  05-20    Assessment:  As above    Plan:  Monitor HVC o/p  D/C Deanne  PT/Rehab

## 2018-05-21 NOTE — PROGRESS NOTE ADULT - SUBJECTIVE AND OBJECTIVE BOX
Name: TOMY ESPINOSA  Age: 77y  Gender: Male    Pt was seen and examined.   feels better    Allergies:  No Known Allergies      PHYSICAL EXAM:    Vitals:  T(C): 36.3 (05-21-18 @ 16:17), Max: 36.3 (05-21-18 @ 16:17)  HR: 65 (05-21-18 @ 16:17) (59 - 71)  BP: 159/72 (05-21-18 @ 16:17) (159/72 - 180/81)  RR: 18 (05-21-18 @ 16:17) (18 - 20)  SpO2: --  Wt(kg): --Vital Signs Last 24 Hrs  T(C): 36.3 (21 May 2018 16:17), Max: 36.3 (21 May 2018 16:17)  T(F): 97.4 (21 May 2018 16:17), Max: 97.4 (21 May 2018 16:17)  HR: 65 (21 May 2018 16:17) (59 - 71)  BP: 159/72 (21 May 2018 16:17) (159/72 - 180/81)  BP(mean): --  RR: 18 (21 May 2018 16:17) (18 - 20)  SpO2: --      NECK: incision not examined  CHEST/LUNG: CTA, B/L, No rales, rhonchi, wheezing,  HEART: S1,S2, N1 Regular rate and rhythm; No murmurs, rubs, or gallops  ABDOMEN: Soft, Nontender, Nondistended; Bowel sounds present  EXTREMITIES:  2+ Peripheral Pulses, No clubbing, cyanosis, or edema      LABS:                        11.4   4.52  )-----------( 142      ( 20 May 2018 08:09 )             34.8     05-20    140  |  105  |  20  ----------------------------<  84  3.8   |  21  |  1.0    Ca    8.2<L>      20 May 2018 08:09    TPro  5.3<L>  /  Alb  3.6  /  TBili  0.3  /  DBili  x   /  AST  27  /  ALT  40  /  AlkPhos  55  05-20    LIVER FUNCTIONS - ( 20 May 2018 08:09 )  Alb: 3.6 g/dL / Pro: 5.3 g/dL / ALK PHOS: 55 U/L / ALT: 40 U/L / AST: 27 U/L / GGT: x             MEDICATIONS  (STANDING):  amantadine 100 milliGRAM(s) Oral daily  dexamethasone     Tablet 4 milliGRAM(s) Oral every 6 hours  dextrose 5% + sodium chloride 0.45%. 1000 milliLiter(s) (75 mL/Hr) IV Continuous <Continuous>  docusate sodium 100 milliGRAM(s) Oral three times a day  efavirenz 600/emtricitabine 200/tenofovir 300mg 1 Tablet(s) Oral at bedtime  FLUoxetine 40 milliGRAM(s) Oral daily  heparin  Injectable 5000 Unit(s) SubCutaneous every 8 hours  pantoprazole    Tablet 40 milliGRAM(s) Oral before breakfast  senna 2 Tablet(s) Oral at bedtime        RADIOLOGY & ADDITIONAL TESTS:    Imaging Personally Reviewed:  [ ] YES  [ ] NO    A/P:  1.  Cervical spine severe stenosis    s/p c-spine C3-6 fusion  doing good x c/o pain but mild to moderate, started on toradol prn      2.  HIV positive for many years.  Pt has h/o prior HIV related diseases although currently asymptomatic          Viral load currently undetectable    Overall pt doing well

## 2018-05-22 RX ORDER — DEXAMETHASONE 0.5 MG/5ML
4 ELIXIR ORAL EVERY 6 HOURS
Qty: 0 | Refills: 0 | Status: DISCONTINUED | OUTPATIENT
Start: 2018-05-22 | End: 2018-05-23

## 2018-05-22 RX ORDER — FAMOTIDINE 10 MG/ML
20 INJECTION INTRAVENOUS DAILY
Qty: 0 | Refills: 0 | Status: DISCONTINUED | OUTPATIENT
Start: 2018-05-22 | End: 2018-05-23

## 2018-05-22 RX ORDER — DEXAMETHASONE 0.5 MG/5ML
0.5 ELIXIR ORAL ONCE
Qty: 0 | Refills: 0 | Status: DISCONTINUED | OUTPATIENT
Start: 2018-05-22 | End: 2018-05-22

## 2018-05-22 RX ORDER — DEXAMETHASONE 0.5 MG/5ML
10 ELIXIR ORAL ONCE
Qty: 0 | Refills: 0 | Status: COMPLETED | OUTPATIENT
Start: 2018-05-22 | End: 2018-05-22

## 2018-05-22 RX ORDER — FAMOTIDINE 10 MG/ML
20 INJECTION INTRAVENOUS
Qty: 0 | Refills: 0 | Status: DISCONTINUED | OUTPATIENT
Start: 2018-05-22 | End: 2018-05-23

## 2018-05-22 RX ADMIN — Medication 10 MILLIGRAM(S): at 17:32

## 2018-05-22 RX ADMIN — Medication 10 MILLIGRAM(S): at 10:23

## 2018-05-22 RX ADMIN — HEPARIN SODIUM 5000 UNIT(S): 5000 INJECTION INTRAVENOUS; SUBCUTANEOUS at 06:27

## 2018-05-22 RX ADMIN — Medication 4 MILLIGRAM(S): at 06:27

## 2018-05-22 RX ADMIN — PANTOPRAZOLE SODIUM 40 MILLIGRAM(S): 20 TABLET, DELAYED RELEASE ORAL at 06:28

## 2018-05-22 RX ADMIN — ONDANSETRON 4 MILLIGRAM(S): 8 TABLET, FILM COATED ORAL at 12:48

## 2018-05-22 RX ADMIN — SODIUM CHLORIDE 75 MILLILITER(S): 9 INJECTION, SOLUTION INTRAVENOUS at 11:29

## 2018-05-22 RX ADMIN — FAMOTIDINE 104 MILLIGRAM(S): 10 INJECTION INTRAVENOUS at 14:58

## 2018-05-22 RX ADMIN — Medication 100 MILLIGRAM(S): at 06:27

## 2018-05-22 RX ADMIN — Medication 102 MILLIGRAM(S): at 14:59

## 2018-05-22 RX ADMIN — HEPARIN SODIUM 5000 UNIT(S): 5000 INJECTION INTRAVENOUS; SUBCUTANEOUS at 14:22

## 2018-05-22 NOTE — CHART NOTE - NSCHARTNOTEFT_GEN_A_CORE
Registered Dietitian Follow-Up     Patient Profile Reviewed                           Yes [x]   No []     Nutrition History Previously Obtained        Yes [x]  No []       Pertinent Subjective Information: RN and patient report a poor appetite due to nausea and vomiting. Pt reports that the vomiting started about 2 days ago (5/20) and as a result he had a poor appetite yesterday (5/21). Today Pt reports he ate a good breakfast however was unable to eat lunch because he started vomiting. Pt was NPO on (5/20) for a procedure (laminectomy). Before the Pt started vomiting the Pt had a good PO intake %.     Pertinent Medical Interventions: Pt has an order for zofran.      Diet order: Pt was on a regular diet during initial assessment. Pt is currently on a lacto-ovo diet as per Pt request.      Pertinent Lab Data: (5/20) H/H 11.4/34.8, calcium 8.2     Pertinent Meds: famotidine, docusate sodium, zofran, pantoprazole, senna     Physical Findings:  - Last BM: Pt reports his last BM was about 2 days ago. Pt denies constipation    - Skin: Jassi Score 17. Skin intact. Surgical incision located on neck. EMR reports pain at incision sight.      Nutrition Requirements  Continue from initial assessment (5/15)  Estimated Calorie Needs: 7394-1482 kcal/day (25-30 kcal/kg IBW)   Estimated Protein Needs: 56-67 gm/day (1-1.2 gm/kg IBW)   Estimated Fluids Needs: 1 ml/kcal     Nutrient Intake:  Pt reports a current poor PO intake due to nausea and vomiting. EMR reports a % intake. Pt was eating poorly on 5(/20) 10-25% most likely due to vomiting.         Nutrition Diagnostic #1  Problem: Inadequate oral intake  Etiology: vomiting   Statement: currently consuming <50% of his meals      Nutrition Intervention: Meals and snacks: continue current diet.                                    Other: continue zofran until vomiting subsides       Goal/Expected Outcome: Consume >75% of meals provided for 3 days 5/25      Indicator/Monitoring: Will monitor intake, nutrition focused physical findings and labs. At risk.

## 2018-05-22 NOTE — PROGRESS NOTE ADULT - SUBJECTIVE AND OBJECTIVE BOX
Subjective: 77yMale with a pmhx of FREQUENT FALLS;HUMERUS HEAD FRACTURE,LEFT,CLOSED INITIAL ENCOUNTER;HIV  ^FREQUENT FALLS;HUMERUS HEAD FRACTURE,LEFT,CLOSED INITIAL ENCOUNTER;HIV  No h/o HF  Handoff  MEWS Score  Nausea & vomiting  Depression  HIV disease  Cervical myelopathy with cervical radiculopathy  Cervical stenosis of spinal canal  Cervical myelopathy with cervical radiculopathy  Cervical stenosis of spinal canal  Cord compression  Frequent falls  Cervical laminectomy with spinal fusion using bone graft  H/O total shoulder replacement, right  Stomach tumor (benign)  DIZZY/FALL/ARM INJURY  34  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 Pt c/o incisional pain at this time. Denies upper extremity radiculopathy. States some improvement in his pre-op hand / feet numbness.    T(C): 37.3 (05-22-18 @ 08:31), Max: 37.3 (05-22-18 @ 08:31)  HR: 58 (05-22-18 @ 08:31) (58 - 65)  BP: 136/70 (05-22-18 @ 08:31) (136/70 - 159/72)  RR: 18 (05-22-18 @ 08:31) (18 - 18)      MEDICATIONS  (STANDING):  amantadine 100 milliGRAM(s) Oral daily  dexamethasone     Tablet 4 milliGRAM(s) Oral every 6 hours  dextrose 5% + sodium chloride 0.45%. 1000 milliLiter(s) (75 mL/Hr) IV Continuous <Continuous>  docusate sodium 100 milliGRAM(s) Oral three times a day  efavirenz 600/emtricitabine 200/tenofovir 300mg 1 Tablet(s) Oral at bedtime  FLUoxetine 40 milliGRAM(s) Oral daily  heparin  Injectable 5000 Unit(s) SubCutaneous every 8 hours  pantoprazole    Tablet 40 milliGRAM(s) Oral before breakfast  senna 2 Tablet(s) Oral at bedtime    MEDICATIONS  (PRN):  diphenhydrAMINE   Capsule 25 milliGRAM(s) Oral every 6 hours PRN Rash and/or Itching  HYDROmorphone  Injectable 0.5 milliGRAM(s) IV Push every 3 hours PRN severe breakthrough pain  ketorolac   Injectable 15 milliGRAM(s) IV Push every 6 hours PRN Moderate Pain (4 - 6)  metoclopramide Injectable 10 milliGRAM(s) IV Push every 6 hours PRN Nausea and Vomitting  ondansetron    Tablet 4 milliGRAM(s) Oral every 8 hours PRN Nausea and/or Vomiting  ondansetron Injectable 4 milliGRAM(s) IV Push every 6 hours PRN Nausea and/or Vomiting  oxyCODONE    5 mG/acetaminophen 325 mG 1 Tablet(s) Oral every 4 hours PRN Mild Pain (1 - 3)  oxyCODONE    5 mG/acetaminophen 325 mG 2 Tablet(s) Oral every 4 hours PRN Moderate Pain (4 - 6)  polyethylene glycol 3350 17 Gram(s) Oral daily PRN Constipation        Exam:  Strength 5/5 distally, bi/tri, Left deltoid  old right shoulder inj with decreased strength  Sensation intact to light touch  +B/L hoffmans  BB  2+ B/L  Dressing C/D/I  hemovac- 9cc in 24 hr, will remove    Imaging:  < from: Xray Chest 1 View- PORTABLE-Routine (05.18.18 @ 09:49) >    Impression:      No consolidation effusion or pneumothorax. Prominent ascending aorta.    < end of copied text >      < from: CT Cervical Spine No Cont (05.16.18 @ 16:57) >  IMPRESSION:    Severe cervical spine degenerative changes with severe spinal stenosis   C3-4, C4-5, C5-6 and C6-7 and multilevel moderate foraminal narrowing.   Associated spinal cord compression better appreciated on the MRI from the  prior day.    < end of copied text >    Assessment/Plan: as above  will d/c hemovac today  pt/rehab  screen for 4A.  d/w attending

## 2018-05-22 NOTE — PROGRESS NOTE ADULT - SUBJECTIVE AND OBJECTIVE BOX
Hemovac discontinued , patient is complaining  of nausea and vomiting has been getting zofran and reglan and pepcid was added today.  Will order an obstructive series since patient has not had a BM in 2 days and has hypoactive blowel sounds.  An ID consult was also placed for evaluation and BMP , amylase ands lipase were ordered for the am rounds .

## 2018-05-22 NOTE — PROGRESS NOTE ADULT - ATTENDING COMMENTS
Pt improved today. No nausea/vomiting. HV to be d/c'd. Pending eval for 4A. Neck pain as expected. Soft collar in bed. Hard collar when OOB.

## 2018-05-23 ENCOUNTER — INPATIENT (INPATIENT)
Facility: HOSPITAL | Age: 78
LOS: 12 days | Discharge: HOME HEALTH | End: 2018-06-05
Attending: PHYSICAL MEDICINE & REHABILITATION | Admitting: PHYSICAL MEDICINE & REHABILITATION

## 2018-05-23 ENCOUNTER — TRANSCRIPTION ENCOUNTER (OUTPATIENT)
Age: 78
End: 2018-05-23

## 2018-05-23 VITALS
RESPIRATION RATE: 20 BRPM | TEMPERATURE: 99 F | SYSTOLIC BLOOD PRESSURE: 130 MMHG | DIASTOLIC BLOOD PRESSURE: 69 MMHG | HEART RATE: 58 BPM

## 2018-05-23 DIAGNOSIS — B20 HUMAN IMMUNODEFICIENCY VIRUS [HIV] DISEASE: ICD-10-CM

## 2018-05-23 DIAGNOSIS — D13.1 BENIGN NEOPLASM OF STOMACH: Chronic | ICD-10-CM

## 2018-05-23 DIAGNOSIS — Z96.611 PRESENCE OF RIGHT ARTIFICIAL SHOULDER JOINT: Chronic | ICD-10-CM

## 2018-05-23 RX ORDER — OXYCODONE AND ACETAMINOPHEN 5; 325 MG/1; MG/1
1 TABLET ORAL EVERY 4 HOURS
Qty: 0 | Refills: 0 | Status: DISCONTINUED | OUTPATIENT
Start: 2018-05-23 | End: 2018-05-28

## 2018-05-23 RX ORDER — METOCLOPRAMIDE HCL 10 MG
5 TABLET ORAL EVERY 8 HOURS
Qty: 0 | Refills: 0 | Status: DISCONTINUED | OUTPATIENT
Start: 2018-05-23 | End: 2018-05-23

## 2018-05-23 RX ORDER — OMEPRAZOLE 10 MG/1
1 CAPSULE, DELAYED RELEASE ORAL
Qty: 0 | Refills: 0 | COMMUNITY

## 2018-05-23 RX ORDER — DIPHENHYDRAMINE HCL 50 MG
1 CAPSULE ORAL
Qty: 0 | Refills: 0 | COMMUNITY
Start: 2018-05-23

## 2018-05-23 RX ORDER — OXYCODONE AND ACETAMINOPHEN 5; 325 MG/1; MG/1
2 TABLET ORAL EVERY 4 HOURS
Qty: 0 | Refills: 0 | Status: DISCONTINUED | OUTPATIENT
Start: 2018-05-23 | End: 2018-05-26

## 2018-05-23 RX ORDER — SENNA PLUS 8.6 MG/1
2 TABLET ORAL
Qty: 0 | Refills: 0 | COMMUNITY
Start: 2018-05-23

## 2018-05-23 RX ORDER — FLUOXETINE HCL 10 MG
40 CAPSULE ORAL DAILY
Qty: 0 | Refills: 0 | Status: DISCONTINUED | OUTPATIENT
Start: 2018-05-23 | End: 2018-06-05

## 2018-05-23 RX ORDER — ACETAMINOPHEN 500 MG
650 TABLET ORAL EVERY 4 HOURS
Qty: 0 | Refills: 0 | Status: DISCONTINUED | OUTPATIENT
Start: 2018-05-23 | End: 2018-05-30

## 2018-05-23 RX ORDER — DOCUSATE SODIUM 100 MG
100 CAPSULE ORAL THREE TIMES A DAY
Qty: 0 | Refills: 0 | Status: DISCONTINUED | OUTPATIENT
Start: 2018-05-23 | End: 2018-06-05

## 2018-05-23 RX ORDER — EFAVIRENZ, EMTRICITABINE AND TENOFOVIR DISOPROXIL FUMARATE 600; 200; 300 MG/1; MG/1; MG/1
1 TABLET, FILM COATED ORAL AT BEDTIME
Qty: 0 | Refills: 0 | Status: DISCONTINUED | OUTPATIENT
Start: 2018-05-23 | End: 2018-06-05

## 2018-05-23 RX ORDER — ONDANSETRON 8 MG/1
0 TABLET, FILM COATED ORAL
Qty: 0 | Refills: 0 | COMMUNITY

## 2018-05-23 RX ORDER — FAMOTIDINE 10 MG/ML
2 INJECTION INTRAVENOUS
Qty: 0 | Refills: 0 | COMMUNITY
Start: 2018-05-23

## 2018-05-23 RX ORDER — POLYETHYLENE GLYCOL 3350 17 G/17G
17 POWDER, FOR SOLUTION ORAL
Qty: 0 | Refills: 0 | COMMUNITY
Start: 2018-05-23

## 2018-05-23 RX ORDER — FLUOXETINE HCL 10 MG
1 CAPSULE ORAL
Qty: 0 | Refills: 0 | COMMUNITY

## 2018-05-23 RX ORDER — EFAVIRENZ, EMTRICITABINE AND TENOFOVIR DISOPROXIL FUMARATE 600; 200; 300 MG/1; MG/1; MG/1
1 TABLET, FILM COATED ORAL
Qty: 0 | Refills: 0 | COMMUNITY

## 2018-05-23 RX ORDER — DEXAMETHASONE 0.5 MG/5ML
4 ELIXIR ORAL EVERY 6 HOURS
Qty: 0 | Refills: 0 | Status: DISCONTINUED | OUTPATIENT
Start: 2018-05-23 | End: 2018-05-23

## 2018-05-23 RX ORDER — SENNA PLUS 8.6 MG/1
2 TABLET ORAL AT BEDTIME
Qty: 0 | Refills: 0 | Status: DISCONTINUED | OUTPATIENT
Start: 2018-05-23 | End: 2018-06-05

## 2018-05-23 RX ORDER — AMANTADINE HCL 100 MG
0 CAPSULE ORAL
Qty: 0 | Refills: 0 | COMMUNITY

## 2018-05-23 RX ORDER — METOCLOPRAMIDE HCL 10 MG
0 TABLET ORAL
Qty: 0 | Refills: 0 | COMMUNITY

## 2018-05-23 RX ORDER — FLUOXETINE HCL 10 MG
1 CAPSULE ORAL
Qty: 0 | Refills: 0 | COMMUNITY
Start: 2018-05-23

## 2018-05-23 RX ORDER — DOCUSATE SODIUM 100 MG
1 CAPSULE ORAL
Qty: 0 | Refills: 0 | COMMUNITY
Start: 2018-05-23

## 2018-05-23 RX ORDER — AMANTADINE HCL 100 MG
1 CAPSULE ORAL
Qty: 0 | Refills: 0 | COMMUNITY
Start: 2018-05-23

## 2018-05-23 RX ORDER — DIPHENHYDRAMINE HCL 50 MG
25 CAPSULE ORAL EVERY 6 HOURS
Qty: 0 | Refills: 0 | Status: DISCONTINUED | OUTPATIENT
Start: 2018-05-23 | End: 2018-06-05

## 2018-05-23 RX ORDER — HYDROMORPHONE HYDROCHLORIDE 2 MG/ML
0.5 INJECTION INTRAMUSCULAR; INTRAVENOUS; SUBCUTANEOUS
Qty: 0 | Refills: 0 | COMMUNITY
Start: 2018-05-23

## 2018-05-23 RX ORDER — PANTOPRAZOLE SODIUM 20 MG/1
40 TABLET, DELAYED RELEASE ORAL
Qty: 0 | Refills: 0 | Status: DISCONTINUED | OUTPATIENT
Start: 2018-05-23 | End: 2018-06-05

## 2018-05-23 RX ORDER — FAMOTIDINE 10 MG/ML
20 INJECTION INTRAVENOUS EVERY 12 HOURS
Qty: 0 | Refills: 0 | Status: DISCONTINUED | OUTPATIENT
Start: 2018-05-23 | End: 2018-05-23

## 2018-05-23 RX ORDER — ONDANSETRON 8 MG/1
1 TABLET, FILM COATED ORAL
Qty: 0 | Refills: 0 | COMMUNITY
Start: 2018-05-23

## 2018-05-23 RX ORDER — ONDANSETRON 8 MG/1
4 TABLET, FILM COATED ORAL EVERY 6 HOURS
Qty: 0 | Refills: 0 | Status: DISCONTINUED | OUTPATIENT
Start: 2018-05-23 | End: 2018-06-05

## 2018-05-23 RX ORDER — HEPARIN SODIUM 5000 [USP'U]/ML
5000 INJECTION INTRAVENOUS; SUBCUTANEOUS
Qty: 0 | Refills: 0 | COMMUNITY
Start: 2018-05-23

## 2018-05-23 RX ORDER — METOCLOPRAMIDE HCL 10 MG
10 TABLET ORAL
Qty: 0 | Refills: 0 | COMMUNITY
Start: 2018-05-23

## 2018-05-23 RX ORDER — EFAVIRENZ, EMTRICITABINE AND TENOFOVIR DISOPROXIL FUMARATE 600; 200; 300 MG/1; MG/1; MG/1
1 TABLET, FILM COATED ORAL
Qty: 0 | Refills: 0 | COMMUNITY
Start: 2018-05-23

## 2018-05-23 RX ORDER — ONDANSETRON 8 MG/1
4 TABLET, FILM COATED ORAL
Qty: 0 | Refills: 0 | COMMUNITY
Start: 2018-05-23

## 2018-05-23 RX ORDER — HEPARIN SODIUM 5000 [USP'U]/ML
5000 INJECTION INTRAVENOUS; SUBCUTANEOUS EVERY 8 HOURS
Qty: 0 | Refills: 0 | Status: DISCONTINUED | OUTPATIENT
Start: 2018-05-23 | End: 2018-06-05

## 2018-05-23 RX ORDER — METOCLOPRAMIDE HCL 10 MG
10 TABLET ORAL THREE TIMES A DAY
Qty: 0 | Refills: 0 | Status: DISCONTINUED | OUTPATIENT
Start: 2018-05-23 | End: 2018-05-24

## 2018-05-23 RX ORDER — POLYETHYLENE GLYCOL 3350 17 G/17G
17 POWDER, FOR SOLUTION ORAL DAILY
Qty: 0 | Refills: 0 | Status: DISCONTINUED | OUTPATIENT
Start: 2018-05-23 | End: 2018-05-26

## 2018-05-23 RX ORDER — AMANTADINE HCL 100 MG
100 CAPSULE ORAL DAILY
Qty: 0 | Refills: 0 | Status: DISCONTINUED | OUTPATIENT
Start: 2018-05-23 | End: 2018-06-05

## 2018-05-23 RX ORDER — PANTOPRAZOLE SODIUM 20 MG/1
40 TABLET, DELAYED RELEASE ORAL
Qty: 0 | Refills: 0 | COMMUNITY
Start: 2018-05-23

## 2018-05-23 RX ADMIN — HEPARIN SODIUM 5000 UNIT(S): 5000 INJECTION INTRAVENOUS; SUBCUTANEOUS at 14:13

## 2018-05-23 RX ADMIN — Medication 4 MILLIGRAM(S): at 11:36

## 2018-05-23 RX ADMIN — Medication 100 MILLIGRAM(S): at 14:13

## 2018-05-23 RX ADMIN — ONDANSETRON 4 MILLIGRAM(S): 8 TABLET, FILM COATED ORAL at 14:03

## 2018-05-23 RX ADMIN — HEPARIN SODIUM 5000 UNIT(S): 5000 INJECTION INTRAVENOUS; SUBCUTANEOUS at 00:16

## 2018-05-23 RX ADMIN — Medication 10 MILLIGRAM(S): at 21:14

## 2018-05-23 RX ADMIN — Medication 102 MILLIGRAM(S): at 01:12

## 2018-05-23 RX ADMIN — Medication 5 MILLIGRAM(S): at 06:44

## 2018-05-23 RX ADMIN — FAMOTIDINE 104 MILLIGRAM(S): 10 INJECTION INTRAVENOUS at 06:44

## 2018-05-23 RX ADMIN — Medication 100 MILLIGRAM(S): at 11:33

## 2018-05-23 RX ADMIN — HEPARIN SODIUM 5000 UNIT(S): 5000 INJECTION INTRAVENOUS; SUBCUTANEOUS at 06:44

## 2018-05-23 NOTE — DISCHARGE NOTE ADULT - HOSPITAL COURSE
77 year old male admitted 5.8.18 with ambulation and gait instability. He had a mechanical fall and landed on his left shoulder. he hit his head but the was no LOC. No fever, n/v/d, CP, SOB, tingling, numbness or headache. patient was followed by Dr. José Nunn for possible parkinson's disease. He was supposed to get MRI soon but he came to the ED. Patient had been falling a lot lately secondary to ataxia. His hand tremors where getting worse for the last 2 months, otherwise he was at his baseline. Work up revealed < from: MR Cervical Spine w/wo IV Cont (05.15.18 @ 18:18) > Severe multilevel degenerative changes again seen. Mild cord compression and mild cord signal abnormality C4-5 and C5-6. Head was negative for acute findings. Ct of the cervical spine showed < from: CT Cervical Spine No Cont (05.16.18 @ 16:57) >Severe cervical spine degenerative changes with severe spinal stenosis C3-4, C4-5, C5-6 and C6-7 and multilevel moderate foraminal narrowing. Associated spinal cord compression better appreciated on the MRI from the prior day. After Medical Clearance he was taken to the operating room on 5.19.18 for a C3-6 Posterior Decompression and Stabilization. He had a PCA pump and Alicea catheter postop. He had moderate N/V postop which was treated with antiemetics. He ambulated with physical therapy. Once his nausea improved he was discharged to inpatient rehabilitation
Patient is a 77y old Male who presents with a chief complaint of frequent Falls and imbalance. Currently admitted to medicine with the primary diagnosis of cervical spinal stenosis. Patient being evaluated by Neurology with work up in place. NeuroSx eval pending. Patient wants to leave Against medical advice and go home.    Discussed with patient that if his cervical neck fractures worsen the cord compression he could have severe complications such as weakness, numbness, paralysis, or even the possibility of quadriplegia. Patient aware of risks and still wants to leave AMA. Patient advised for outpatient follow up soon with neurosx for eval.

## 2018-05-23 NOTE — CONSULT NOTE ADULT - SUBJECTIVE AND OBJECTIVE BOX
TOMY ESPINOSA  77y, Male  Allergy: No Known Allergies      HPI:  76 y/o male with PMH of HIV on HAART and depression p/w the above complaint. yesterday patient had a mechanical fall and landed on his left shoulder. he hit his head but the was no LOC. No fever, n/v/d, CP, SOB, tingling, numbness or headache. patient is following with Dr. José Nunn for possible parkinson's disease. He was supposed to get MRI soon but he came to the ED.  Patient had been falling a lot lately secondary to ataxia. His hand tremors where getting worse for the last 2 months. otherwise he is at his baseline.    In patient was given 1 L of NS.     MRI cervical spine significant for mild cord compression and mild cord signal abnormality C4-5 and C5-6. pt is s/p C3-6 posterior decompression and fusion. ID consulted for nausea and vomiting.     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 RRR.   ABD: Soft, non-tender, non-distended. Bowel sounds present  EXT: NC/NC/NE/2+PP/THOMAS  NEURO: AAOX3    TESTS & MEASUREMENTS:                    RADIOLOGY & ADDITIONAL TESTS:    ANTIBIOTICS:  efavirenz 600/emtricitabine 200/tenofovir 300mg 1 Tablet(s) Oral at bedtime

## 2018-05-23 NOTE — DISCHARGE NOTE ADULT - MEDICATION SUMMARY - MEDICATIONS TO STOP TAKING
I will STOP taking the medications listed below when I get home from the hospital:    FLUoxetine  -- 40 mg
I will STOP taking the medications listed below when I get home from the hospital:  None

## 2018-05-23 NOTE — CONSULT NOTE ADULT - CONSULT REASON
pt who is HIV+ on HAART with a loit of nausea and vomiting pt who is HIV+ on HAART with a lot of nausea and vomiting

## 2018-05-23 NOTE — DISCHARGE NOTE ADULT - CARE PLAN
Principal Discharge DX:	Cord compression  Goal:	Pain Relief  Assessment and plan of treatment:	S/P C3-6 Posterior Decompression and Stablization
Principal Discharge DX:	Cord compression  Goal:	Stay in the hospital for Neurosurgical Evaluation  Assessment and plan of treatment:	Stay in the hospital for Neurosurgical Evaluation

## 2018-05-23 NOTE — DISCHARGE NOTE ADULT - NS MD DC FALL RISK RISK
For information on Fall & Injury Prevention, visit [email or web address]
For information on Fall & Injury Prevention, visit [email or web address]

## 2018-05-23 NOTE — PROGRESS NOTE ADULT - SUBJECTIVE AND OBJECTIVE BOX
POD # 4    S/P C3-6 Posterior Decompression and Stabilization    Pt seen and examined at bedside. Pt c/o incisional pain at this time. Denies radiculopathy. c/o residual parasthesias although improved from pre-op. Nausea better. Pt tolerating PO diet    Vital Signs Last 24 Hrs  T(C): 37.4 (23 May 2018 08:02), Max: 37.4 (23 May 2018 08:02)  T(F): 99.4 (23 May 2018 08:02), Max: 99.4 (23 May 2018 08:02)  HR: 58 (23 May 2018 08:02) (52 - 67)  BP: 130/69 (23 May 2018 08:02) (122/56 - 171/76)  BP(mean): --  RR: 20 (23 May 2018 08:02) (18 - 20)  SpO2: --    PHYSICAL EXAM:  Strength 5/5 LUE  RUE 5/5 distal Bi/Tri  Delt UTO TSR  Sensation intact to light touch  + B/L Hoffmans  Incision C/D/I    MEDICATIONS:  Antibiotics:  efavirenz 600/emtricitabine 200/tenofovir 300mg 1 Tablet(s) Oral at bedtime    Neuro:  amantadine 100 milliGRAM(s) Oral daily  diphenhydrAMINE   Capsule 25 milliGRAM(s) Oral every 6 hours PRN  FLUoxetine 40 milliGRAM(s) Oral daily  HYDROmorphone  Injectable 0.5 milliGRAM(s) IV Push every 3 hours PRN  ketorolac   Injectable 15 milliGRAM(s) IV Push every 6 hours PRN  metoclopramide Injectable 5 milliGRAM(s) IV Push every 8 hours  ondansetron    Tablet 4 milliGRAM(s) Oral every 8 hours PRN  ondansetron Injectable 4 milliGRAM(s) IV Push every 6 hours PRN  oxyCODONE    5 mG/acetaminophen 325 mG 1 Tablet(s) Oral every 4 hours PRN  oxyCODONE    5 mG/acetaminophen 325 mG 2 Tablet(s) Oral every 4 hours PRN    Anticoagulation:  heparin  Injectable 5000 Unit(s) SubCutaneous every 8 hours    OTHER:  dexamethasone  Injectable 4 milliGRAM(s) IV Push every 6 hours  docusate sodium 100 milliGRAM(s) Oral three times a day  famotidine Injectable 20 milliGRAM(s) IV Push every 12 hours  polyethylene glycol 3350 17 Gram(s) Oral daily PRN  senna 2 Tablet(s) Oral at bedtime    IVF:  dextrose 5% + sodium chloride 0.45%. 1000 milliLiter(s) IV Continuous <Continuous>    Assessment:  As above    Plan:  Pain Meds PRN  PT/Rehab  SW for D/C Planning POD # 4    S/P C3-6 Posterior Decompression and Stabilization    Pt seen and examined at bedside. Pt c/o incisional pain at this time. Denies radiculopathy. c/o residual parasthesias although improved from pre-op. Nausea better. Pt tolerating PO diet. Sts last BM Sunday. + Flatus    Vital Signs Last 24 Hrs  T(C): 37.4 (23 May 2018 08:02), Max: 37.4 (23 May 2018 08:02)  T(F): 99.4 (23 May 2018 08:02), Max: 99.4 (23 May 2018 08:02)  HR: 58 (23 May 2018 08:02) (52 - 67)  BP: 130/69 (23 May 2018 08:02) (122/56 - 171/76)  BP(mean): --  RR: 20 (23 May 2018 08:02) (18 - 20)  SpO2: --    PHYSICAL EXAM:  Strength 5/5 LUE  RUE 5/5 distal Bi/Tri  Delt UTO TSR  Sensation intact to light touch  + B/L Hoffmans  Incision C/D/I    MEDICATIONS:  Antibiotics:  efavirenz 600/emtricitabine 200/tenofovir 300mg 1 Tablet(s) Oral at bedtime    Neuro:  amantadine 100 milliGRAM(s) Oral daily  diphenhydrAMINE   Capsule 25 milliGRAM(s) Oral every 6 hours PRN  FLUoxetine 40 milliGRAM(s) Oral daily  HYDROmorphone  Injectable 0.5 milliGRAM(s) IV Push every 3 hours PRN  ketorolac   Injectable 15 milliGRAM(s) IV Push every 6 hours PRN  metoclopramide Injectable 5 milliGRAM(s) IV Push every 8 hours  ondansetron    Tablet 4 milliGRAM(s) Oral every 8 hours PRN  ondansetron Injectable 4 milliGRAM(s) IV Push every 6 hours PRN  oxyCODONE    5 mG/acetaminophen 325 mG 1 Tablet(s) Oral every 4 hours PRN  oxyCODONE    5 mG/acetaminophen 325 mG 2 Tablet(s) Oral every 4 hours PRN    Anticoagulation:  heparin  Injectable 5000 Unit(s) SubCutaneous every 8 hours    OTHER:  dexamethasone  Injectable 4 milliGRAM(s) IV Push every 6 hours  docusate sodium 100 milliGRAM(s) Oral three times a day  famotidine Injectable 20 milliGRAM(s) IV Push every 12 hours  polyethylene glycol 3350 17 Gram(s) Oral daily PRN  senna 2 Tablet(s) Oral at bedtime    IVF:  dextrose 5% + sodium chloride 0.45%. 1000 milliLiter(s) IV Continuous <Continuous>    Assessment:  As above    Plan:  Pain Meds PRN  PT/Rehab  SW for D/C Planning  BM Check - continue laxatives

## 2018-05-23 NOTE — DISCHARGE NOTE ADULT - MEDICATION SUMMARY - MEDICATIONS TO TAKE
I will START or STAY ON the medications listed below when I get home from the hospital:    HYDROmorphone  -- 0.5 milligram(s) intravenous every 3 hours, As Needed  -- Indication: For Severe Pain    oxyCODONE-acetaminophen 5 mg-325 mg oral tablet  -- 2 tab(s) by mouth every 4 hours, As needed, Moderate Pain (4 - 6)  -- Indication: For Moderate Pain    oxyCODONE-acetaminophen 5 mg-325 mg oral tablet  -- 1 tab(s) by mouth every 4 hours, As needed, Mild Pain (1 - 3)  -- Indication: For Mild Pain    heparin  -- 5000 unit(s) subcutaneous every 8 hours  -- Indication: For DVT Prophylaxis    FLUoxetine 40 mg oral capsule  -- 1 cap(s) by mouth once a day  -- Indication: For Depression    metoclopramide 5 mg/mL injectable solution  -- 10 milligram(s) injectable every 6 hours, As Needed  -- Indication: For Nausea    diphenhydrAMINE 25 mg oral capsule  -- 1 cap(s) by mouth every 6 hours, As needed, Rash and/or Itching  -- Indication: For Antihistamine    ondansetron 2 mg/mL injectable solution  -- 4 milligram(s) injectable every 6 hours, As Needed  -- Indication: For Nausea    amantadine 100 mg oral capsule  -- 1 cap(s) by mouth once a day  -- Indication: For HIV    efavirenz/emtricitabine/tenofovir disoproxil fumarate 600 mg-200 mg-300 mg oral tablet  -- 1 tab(s) by mouth once a day (at bedtime)  -- Indication: For HIV    senna oral tablet  -- 2 tab(s) by mouth once a day (at bedtime)  -- Indication: For Constipation    docusate sodium 100 mg oral capsule  -- 1 cap(s) by mouth 3 times a day  -- Indication: For Constipation    polyethylene glycol 3350 oral powder for reconstitution  -- 17 gram(s) by mouth once a day, As needed, Constipation  -- Indication: For Constipation    pantoprazole 40 mg oral delayed release tablet  -- 40 milligram(s) by mouth once a day  -- Indication: For GERD

## 2018-05-23 NOTE — PROGRESS NOTE ADULT - SUBJECTIVE AND OBJECTIVE BOX
Name: TOMY ESPINOSA  Age: 77y  Gender: Male    Pt was seen and examined.   c/o: vomiting all day, nauseous     Allergies:  No Known Allergies      PHYSICAL EXAM:    Vitals:  T(C): 36.8 (05-22-18 @ 23:31), Max: 37.3 (05-22-18 @ 08:31)  HR: 52 (05-22-18 @ 23:31) (52 - 67)  BP: 122/56 (05-22-18 @ 23:31) (122/56 - 171/76)  RR: 20 (05-22-18 @ 23:31) (18 - 20)  SpO2: --  Wt(kg): --Vital Signs Last 24 Hrs  T(C): 36.8 (22 May 2018 23:31), Max: 37.3 (22 May 2018 08:31)  T(F): 98.3 (22 May 2018 23:31), Max: 99.1 (22 May 2018 08:31)  HR: 52 (22 May 2018 23:31) (52 - 67)  BP: 122/56 (22 May 2018 23:31) (122/56 - 171/76)  BP(mean): --  RR: 20 (22 May 2018 23:31) (18 - 20)  SpO2: --      NECK: healing well, soft collar off  CHEST/LUNG: CTA, B/L, No rales, rhonchi, wheezing,  HEART: S1,S2, N1 Regular rate and rhythm; No murmurs, rubs, or gallops  ABDOMEN: Soft, Nontender, Nondistended; Bowel sounds present  EXTREMITIES:  2+ Peripheral Pulses, No clubbing, cyanosis, or edema      MEDICATIONS  (STANDING):  amantadine 100 milliGRAM(s) Oral daily  dexamethasone  IVPB 4 milliGRAM(s) IV Intermittent every 6 hours  dextrose 5% + sodium chloride 0.45%. 1000 milliLiter(s) (75 mL/Hr) IV Continuous <Continuous>  docusate sodium 100 milliGRAM(s) Oral three times a day  efavirenz 600/emtricitabine 200/tenofovir 300mg 1 Tablet(s) Oral at bedtime  famotidine  IVPB 20 milliGRAM(s) IV Intermittent two times a day  famotidine  IVPB 20 milliGRAM(s) IV Intermittent daily  FLUoxetine 40 milliGRAM(s) Oral daily  heparin  Injectable 5000 Unit(s) SubCutaneous every 8 hours  pantoprazole    Tablet 40 milliGRAM(s) Oral before breakfast  senna 2 Tablet(s) Oral at bedtime        RADIOLOGY & ADDITIONAL TESTS:    Imaging Personally Reviewed:  [ ] YES  [ ] NO    A/P:  1.  Cervical spine severe stenosis    s/p c-spine C3-6 fusion  doing good x c/o pain but mild to moderate, started on toradol prn  will start on reglan 5mg iv q8 hrs standing dose and use zofran prn    2.  HIV positive for many years.  Pt has h/o prior HIV related diseases although currently asymptomatic          Viral load currently undetectable    PLAN - assess for rehab

## 2018-05-23 NOTE — DISCHARGE NOTE ADULT - PATIENT PORTAL LINK FT
You can access the [employer] [facility] Patient Portal, offered by [facility], by registering with the following website: [email or web address]
You can access the [facility] Patient Portal, offered by [facility], by registering with the following website: [email or web address]

## 2018-05-23 NOTE — CONSULT NOTE ADULT - ASSESSMENT
78 yo M with hx of HIV on HAART and depression presented for frequent falls. s/p C3-6 posterior decompression and fusion. POD#4 and complaining of worsening nausea and vomiting.     Plan:

## 2018-05-23 NOTE — DISCHARGE NOTE ADULT - CARE PROVIDER_API CALL
Jermaine Arvizu), Surgical Physicians  [address] [place] [address]  Phone: [phone]  Fax: [phone]
Brent Gaviria (MD), Medicine  [address] [place] [address]  Phone: [phone]  Fax: [phone]    Malinda Goins), Neurological Surgery  [address] [place] [id number]  Phone: [phone]  Fax: [phone]

## 2018-05-24 LAB
ALBUMIN SERPL ELPH-MCNC: 3.5 G/DL — SIGNIFICANT CHANGE UP (ref 3.5–5.2)
ALP SERPL-CCNC: 60 U/L — SIGNIFICANT CHANGE UP (ref 30–115)
ALT FLD-CCNC: 35 U/L — SIGNIFICANT CHANGE UP (ref 0–41)
ANION GAP SERPL CALC-SCNC: 14 MMOL/L — SIGNIFICANT CHANGE UP (ref 7–14)
APPEARANCE UR: (no result)
AST SERPL-CCNC: 28 U/L — SIGNIFICANT CHANGE UP (ref 0–41)
BILIRUB SERPL-MCNC: 0.3 MG/DL — SIGNIFICANT CHANGE UP (ref 0.2–1.2)
BILIRUB UR-MCNC: NEGATIVE — SIGNIFICANT CHANGE UP
BUN SERPL-MCNC: 33 MG/DL — HIGH (ref 10–20)
CALCIUM SERPL-MCNC: 8.8 MG/DL — SIGNIFICANT CHANGE UP (ref 8.5–10.1)
CHLORIDE SERPL-SCNC: 104 MMOL/L — SIGNIFICANT CHANGE UP (ref 98–110)
CO2 SERPL-SCNC: 23 MMOL/L — SIGNIFICANT CHANGE UP (ref 17–32)
COLOR SPEC: YELLOW — SIGNIFICANT CHANGE UP
CREAT SERPL-MCNC: 1 MG/DL — SIGNIFICANT CHANGE UP (ref 0.7–1.5)
DIFF PNL FLD: NEGATIVE — SIGNIFICANT CHANGE UP
GLUCOSE SERPL-MCNC: 94 MG/DL — SIGNIFICANT CHANGE UP (ref 70–99)
GLUCOSE UR QL: NEGATIVE MG/DL — SIGNIFICANT CHANGE UP
HCT VFR BLD CALC: 35.1 % — LOW (ref 42–52)
HGB BLD-MCNC: 11.8 G/DL — LOW (ref 14–18)
KETONES UR-MCNC: NEGATIVE — SIGNIFICANT CHANGE UP
LEUKOCYTE ESTERASE UR-ACNC: NEGATIVE — SIGNIFICANT CHANGE UP
MCHC RBC-ENTMCNC: 29.4 PG — SIGNIFICANT CHANGE UP (ref 27–31)
MCHC RBC-ENTMCNC: 33.6 G/DL — SIGNIFICANT CHANGE UP (ref 32–37)
MCV RBC AUTO: 87.5 FL — SIGNIFICANT CHANGE UP (ref 80–94)
NITRITE UR-MCNC: NEGATIVE — SIGNIFICANT CHANGE UP
NRBC # BLD: 0 /100 WBCS — SIGNIFICANT CHANGE UP (ref 0–0)
PH UR: 6.5 — SIGNIFICANT CHANGE UP (ref 5–8)
PLATELET # BLD AUTO: 184 K/UL — SIGNIFICANT CHANGE UP (ref 130–400)
POTASSIUM SERPL-MCNC: 3.8 MMOL/L — SIGNIFICANT CHANGE UP (ref 3.5–5)
POTASSIUM SERPL-SCNC: 3.8 MMOL/L — SIGNIFICANT CHANGE UP (ref 3.5–5)
PROT SERPL-MCNC: 5.9 G/DL — LOW (ref 6–8)
PROT UR-MCNC: (no result) MG/DL
RBC # BLD: 4.01 M/UL — LOW (ref 4.7–6.1)
RBC # FLD: 13.8 % — SIGNIFICANT CHANGE UP (ref 11.5–14.5)
SODIUM SERPL-SCNC: 141 MMOL/L — SIGNIFICANT CHANGE UP (ref 135–146)
SP GR SPEC: 1.02 — SIGNIFICANT CHANGE UP (ref 1.01–1.03)
UROBILINOGEN FLD QL: 0.2 MG/DL — SIGNIFICANT CHANGE UP (ref 0.2–0.2)
WBC # BLD: 4.99 K/UL — SIGNIFICANT CHANGE UP (ref 4.8–10.8)
WBC # FLD AUTO: 4.99 K/UL — SIGNIFICANT CHANGE UP (ref 4.8–10.8)

## 2018-05-24 RX ORDER — METOCLOPRAMIDE HCL 10 MG
10 TABLET ORAL THREE TIMES A DAY
Qty: 0 | Refills: 0 | Status: DISCONTINUED | OUTPATIENT
Start: 2018-05-24 | End: 2018-05-26

## 2018-05-24 RX ADMIN — HEPARIN SODIUM 5000 UNIT(S): 5000 INJECTION INTRAVENOUS; SUBCUTANEOUS at 13:34

## 2018-05-24 RX ADMIN — SENNA PLUS 2 TABLET(S): 8.6 TABLET ORAL at 22:02

## 2018-05-24 RX ADMIN — HEPARIN SODIUM 5000 UNIT(S): 5000 INJECTION INTRAVENOUS; SUBCUTANEOUS at 05:51

## 2018-05-24 RX ADMIN — Medication 100 MILLIGRAM(S): at 22:02

## 2018-05-24 RX ADMIN — EFAVIRENZ, EMTRICITABINE AND TENOFOVIR DISOPROXIL FUMARATE 1 TABLET(S): 600; 200; 300 TABLET, FILM COATED ORAL at 22:03

## 2018-05-24 RX ADMIN — Medication 40 MILLIGRAM(S): at 12:10

## 2018-05-24 RX ADMIN — Medication 650 MILLIGRAM(S): at 13:33

## 2018-05-24 RX ADMIN — Medication 100 MILLIGRAM(S): at 12:09

## 2018-05-24 RX ADMIN — Medication 10 MILLIGRAM(S): at 08:01

## 2018-05-24 RX ADMIN — HEPARIN SODIUM 5000 UNIT(S): 5000 INJECTION INTRAVENOUS; SUBCUTANEOUS at 22:02

## 2018-05-24 RX ADMIN — Medication 100 MILLIGRAM(S): at 13:34

## 2018-05-24 RX ADMIN — Medication 10 MILLIGRAM(S): at 13:34

## 2018-05-24 RX ADMIN — PANTOPRAZOLE SODIUM 40 MILLIGRAM(S): 20 TABLET, DELAYED RELEASE ORAL at 08:02

## 2018-05-24 RX ADMIN — Medication 100 MILLIGRAM(S): at 05:51

## 2018-05-24 RX ADMIN — OXYCODONE AND ACETAMINOPHEN 1 TABLET(S): 5; 325 TABLET ORAL at 09:55

## 2018-05-24 RX ADMIN — Medication 10 MILLIGRAM(S): at 22:03

## 2018-05-25 LAB — VIT D25+D1,25 OH+D1,25 PNL SERPL-MCNC: 92.7 PG/ML — HIGH (ref 19.9–79.3)

## 2018-05-25 RX ADMIN — OXYCODONE AND ACETAMINOPHEN 2 TABLET(S): 5; 325 TABLET ORAL at 15:54

## 2018-05-25 RX ADMIN — HEPARIN SODIUM 5000 UNIT(S): 5000 INJECTION INTRAVENOUS; SUBCUTANEOUS at 21:48

## 2018-05-25 RX ADMIN — Medication 10 MILLIGRAM(S): at 14:21

## 2018-05-25 RX ADMIN — Medication 100 MILLIGRAM(S): at 12:28

## 2018-05-25 RX ADMIN — OXYCODONE AND ACETAMINOPHEN 2 TABLET(S): 5; 325 TABLET ORAL at 12:35

## 2018-05-25 RX ADMIN — OXYCODONE AND ACETAMINOPHEN 2 TABLET(S): 5; 325 TABLET ORAL at 18:14

## 2018-05-25 RX ADMIN — PANTOPRAZOLE SODIUM 40 MILLIGRAM(S): 20 TABLET, DELAYED RELEASE ORAL at 06:04

## 2018-05-25 RX ADMIN — Medication 100 MILLIGRAM(S): at 05:55

## 2018-05-25 RX ADMIN — OXYCODONE AND ACETAMINOPHEN 2 TABLET(S): 5; 325 TABLET ORAL at 18:26

## 2018-05-25 RX ADMIN — OXYCODONE AND ACETAMINOPHEN 1 TABLET(S): 5; 325 TABLET ORAL at 17:59

## 2018-05-25 RX ADMIN — HEPARIN SODIUM 5000 UNIT(S): 5000 INJECTION INTRAVENOUS; SUBCUTANEOUS at 14:21

## 2018-05-25 RX ADMIN — SENNA PLUS 2 TABLET(S): 8.6 TABLET ORAL at 21:49

## 2018-05-25 RX ADMIN — Medication 10 MILLIGRAM(S): at 05:55

## 2018-05-25 RX ADMIN — Medication 100 MILLIGRAM(S): at 14:21

## 2018-05-25 RX ADMIN — Medication 10 MILLIGRAM(S): at 21:49

## 2018-05-25 RX ADMIN — Medication 40 MILLIGRAM(S): at 12:28

## 2018-05-25 RX ADMIN — HEPARIN SODIUM 5000 UNIT(S): 5000 INJECTION INTRAVENOUS; SUBCUTANEOUS at 05:55

## 2018-05-25 RX ADMIN — EFAVIRENZ, EMTRICITABINE AND TENOFOVIR DISOPROXIL FUMARATE 1 TABLET(S): 600; 200; 300 TABLET, FILM COATED ORAL at 21:50

## 2018-05-25 RX ADMIN — OXYCODONE AND ACETAMINOPHEN 2 TABLET(S): 5; 325 TABLET ORAL at 08:27

## 2018-05-25 RX ADMIN — Medication 100 MILLIGRAM(S): at 21:49

## 2018-05-26 RX ORDER — METOCLOPRAMIDE HCL 10 MG
10 TABLET ORAL
Qty: 0 | Refills: 0 | Status: DISCONTINUED | OUTPATIENT
Start: 2018-05-26 | End: 2018-05-29

## 2018-05-26 RX ORDER — POLYETHYLENE GLYCOL 3350 17 G/17G
17 POWDER, FOR SOLUTION ORAL
Qty: 0 | Refills: 0 | Status: DISCONTINUED | OUTPATIENT
Start: 2018-05-26 | End: 2018-06-05

## 2018-05-26 RX ADMIN — HEPARIN SODIUM 5000 UNIT(S): 5000 INJECTION INTRAVENOUS; SUBCUTANEOUS at 13:39

## 2018-05-26 RX ADMIN — OXYCODONE AND ACETAMINOPHEN 1 TABLET(S): 5; 325 TABLET ORAL at 22:07

## 2018-05-26 RX ADMIN — HEPARIN SODIUM 5000 UNIT(S): 5000 INJECTION INTRAVENOUS; SUBCUTANEOUS at 07:45

## 2018-05-26 RX ADMIN — HEPARIN SODIUM 5000 UNIT(S): 5000 INJECTION INTRAVENOUS; SUBCUTANEOUS at 21:16

## 2018-05-26 RX ADMIN — Medication 100 MILLIGRAM(S): at 13:38

## 2018-05-26 RX ADMIN — Medication 100 MILLIGRAM(S): at 07:44

## 2018-05-26 RX ADMIN — Medication 10 MILLIGRAM(S): at 13:00

## 2018-05-26 RX ADMIN — OXYCODONE AND ACETAMINOPHEN 1 TABLET(S): 5; 325 TABLET ORAL at 14:26

## 2018-05-26 RX ADMIN — POLYETHYLENE GLYCOL 3350 17 GRAM(S): 17 POWDER, FOR SOLUTION ORAL at 13:37

## 2018-05-26 RX ADMIN — EFAVIRENZ, EMTRICITABINE AND TENOFOVIR DISOPROXIL FUMARATE 1 TABLET(S): 600; 200; 300 TABLET, FILM COATED ORAL at 21:18

## 2018-05-26 RX ADMIN — Medication 10 MILLIGRAM(S): at 07:44

## 2018-05-26 RX ADMIN — PANTOPRAZOLE SODIUM 40 MILLIGRAM(S): 20 TABLET, DELAYED RELEASE ORAL at 07:46

## 2018-05-26 RX ADMIN — Medication 5 MILLIGRAM(S): at 17:10

## 2018-05-26 RX ADMIN — Medication 10 MILLIGRAM(S): at 17:12

## 2018-05-26 RX ADMIN — Medication 10 MILLIGRAM(S): at 19:00

## 2018-05-27 RX ADMIN — Medication 100 MILLIGRAM(S): at 13:27

## 2018-05-27 RX ADMIN — HEPARIN SODIUM 5000 UNIT(S): 5000 INJECTION INTRAVENOUS; SUBCUTANEOUS at 05:35

## 2018-05-27 RX ADMIN — PANTOPRAZOLE SODIUM 40 MILLIGRAM(S): 20 TABLET, DELAYED RELEASE ORAL at 05:36

## 2018-05-27 RX ADMIN — HEPARIN SODIUM 5000 UNIT(S): 5000 INJECTION INTRAVENOUS; SUBCUTANEOUS at 13:27

## 2018-05-27 RX ADMIN — Medication 10 MILLIGRAM(S): at 12:46

## 2018-05-27 RX ADMIN — OXYCODONE AND ACETAMINOPHEN 1 TABLET(S): 5; 325 TABLET ORAL at 18:00

## 2018-05-27 RX ADMIN — Medication 10 MILLIGRAM(S): at 17:22

## 2018-05-27 RX ADMIN — OXYCODONE AND ACETAMINOPHEN 1 TABLET(S): 5; 325 TABLET ORAL at 00:42

## 2018-05-27 RX ADMIN — OXYCODONE AND ACETAMINOPHEN 1 TABLET(S): 5; 325 TABLET ORAL at 10:46

## 2018-05-27 RX ADMIN — OXYCODONE AND ACETAMINOPHEN 1 TABLET(S): 5; 325 TABLET ORAL at 19:57

## 2018-05-27 RX ADMIN — OXYCODONE AND ACETAMINOPHEN 1 TABLET(S): 5; 325 TABLET ORAL at 14:00

## 2018-05-27 RX ADMIN — OXYCODONE AND ACETAMINOPHEN 1 TABLET(S): 5; 325 TABLET ORAL at 13:36

## 2018-05-27 RX ADMIN — Medication 10 MILLIGRAM(S): at 00:41

## 2018-05-27 RX ADMIN — Medication 5 MILLIGRAM(S): at 12:48

## 2018-05-27 RX ADMIN — Medication 40 MILLIGRAM(S): at 12:46

## 2018-05-27 RX ADMIN — OXYCODONE AND ACETAMINOPHEN 1 TABLET(S): 5; 325 TABLET ORAL at 09:31

## 2018-05-27 RX ADMIN — Medication 10 MILLIGRAM(S): at 05:36

## 2018-05-28 RX ORDER — OXYCODONE AND ACETAMINOPHEN 5; 325 MG/1; MG/1
1 TABLET ORAL EVERY 4 HOURS
Qty: 0 | Refills: 0 | Status: DISCONTINUED | OUTPATIENT
Start: 2018-05-28 | End: 2018-05-30

## 2018-05-28 RX ADMIN — PANTOPRAZOLE SODIUM 40 MILLIGRAM(S): 20 TABLET, DELAYED RELEASE ORAL at 05:42

## 2018-05-28 RX ADMIN — OXYCODONE AND ACETAMINOPHEN 1 TABLET(S): 5; 325 TABLET ORAL at 18:26

## 2018-05-28 RX ADMIN — HEPARIN SODIUM 5000 UNIT(S): 5000 INJECTION INTRAVENOUS; SUBCUTANEOUS at 12:30

## 2018-05-28 RX ADMIN — HEPARIN SODIUM 5000 UNIT(S): 5000 INJECTION INTRAVENOUS; SUBCUTANEOUS at 21:28

## 2018-05-28 RX ADMIN — Medication 40 MILLIGRAM(S): at 11:04

## 2018-05-28 RX ADMIN — SENNA PLUS 2 TABLET(S): 8.6 TABLET ORAL at 21:28

## 2018-05-28 RX ADMIN — Medication 100 MILLIGRAM(S): at 11:02

## 2018-05-28 RX ADMIN — OXYCODONE AND ACETAMINOPHEN 1 TABLET(S): 5; 325 TABLET ORAL at 11:02

## 2018-05-28 RX ADMIN — Medication 10 MILLIGRAM(S): at 05:39

## 2018-05-28 RX ADMIN — Medication 100 MILLIGRAM(S): at 05:39

## 2018-05-28 RX ADMIN — OXYCODONE AND ACETAMINOPHEN 1 TABLET(S): 5; 325 TABLET ORAL at 06:09

## 2018-05-28 RX ADMIN — Medication 100 MILLIGRAM(S): at 21:28

## 2018-05-28 RX ADMIN — Medication 10 MILLIGRAM(S): at 11:04

## 2018-05-28 RX ADMIN — OXYCODONE AND ACETAMINOPHEN 1 TABLET(S): 5; 325 TABLET ORAL at 11:04

## 2018-05-28 RX ADMIN — Medication 10 MILLIGRAM(S): at 23:47

## 2018-05-28 RX ADMIN — HEPARIN SODIUM 5000 UNIT(S): 5000 INJECTION INTRAVENOUS; SUBCUTANEOUS at 05:39

## 2018-05-28 RX ADMIN — OXYCODONE AND ACETAMINOPHEN 1 TABLET(S): 5; 325 TABLET ORAL at 06:40

## 2018-05-28 RX ADMIN — EFAVIRENZ, EMTRICITABINE AND TENOFOVIR DISOPROXIL FUMARATE 1 TABLET(S): 600; 200; 300 TABLET, FILM COATED ORAL at 21:29

## 2018-05-29 LAB
ANION GAP SERPL CALC-SCNC: 13 MMOL/L — SIGNIFICANT CHANGE UP (ref 7–14)
BUN SERPL-MCNC: 34 MG/DL — HIGH (ref 10–20)
CALCIUM SERPL-MCNC: 9.1 MG/DL — SIGNIFICANT CHANGE UP (ref 8.5–10.1)
CHLORIDE SERPL-SCNC: 100 MMOL/L — SIGNIFICANT CHANGE UP (ref 98–110)
CO2 SERPL-SCNC: 25 MMOL/L — SIGNIFICANT CHANGE UP (ref 17–32)
CREAT SERPL-MCNC: 1.1 MG/DL — SIGNIFICANT CHANGE UP (ref 0.7–1.5)
GLUCOSE SERPL-MCNC: 92 MG/DL — SIGNIFICANT CHANGE UP (ref 70–99)
HCT VFR BLD CALC: 39.6 % — LOW (ref 42–52)
HGB BLD-MCNC: 13.2 G/DL — LOW (ref 14–18)
MCHC RBC-ENTMCNC: 29.9 PG — SIGNIFICANT CHANGE UP (ref 27–31)
MCHC RBC-ENTMCNC: 33.3 G/DL — SIGNIFICANT CHANGE UP (ref 32–37)
MCV RBC AUTO: 89.6 FL — SIGNIFICANT CHANGE UP (ref 80–94)
NRBC # BLD: 0 /100 WBCS — SIGNIFICANT CHANGE UP (ref 0–0)
PLATELET # BLD AUTO: 235 K/UL — SIGNIFICANT CHANGE UP (ref 130–400)
POTASSIUM SERPL-MCNC: 4.7 MMOL/L — SIGNIFICANT CHANGE UP (ref 3.5–5)
POTASSIUM SERPL-SCNC: 4.7 MMOL/L — SIGNIFICANT CHANGE UP (ref 3.5–5)
RBC # BLD: 4.42 M/UL — LOW (ref 4.7–6.1)
RBC # FLD: 14 % — SIGNIFICANT CHANGE UP (ref 11.5–14.5)
SODIUM SERPL-SCNC: 138 MMOL/L — SIGNIFICANT CHANGE UP (ref 135–146)
WBC # BLD: 6.28 K/UL — SIGNIFICANT CHANGE UP (ref 4.8–10.8)
WBC # FLD AUTO: 6.28 K/UL — SIGNIFICANT CHANGE UP (ref 4.8–10.8)

## 2018-05-29 RX ORDER — METOCLOPRAMIDE HCL 10 MG
10 TABLET ORAL
Qty: 0 | Refills: 0 | Status: DISCONTINUED | OUTPATIENT
Start: 2018-05-29 | End: 2018-06-05

## 2018-05-29 RX ADMIN — OXYCODONE AND ACETAMINOPHEN 1 TABLET(S): 5; 325 TABLET ORAL at 18:07

## 2018-05-29 RX ADMIN — HEPARIN SODIUM 5000 UNIT(S): 5000 INJECTION INTRAVENOUS; SUBCUTANEOUS at 21:52

## 2018-05-29 RX ADMIN — Medication 100 MILLIGRAM(S): at 11:26

## 2018-05-29 RX ADMIN — Medication 100 MILLIGRAM(S): at 21:51

## 2018-05-29 RX ADMIN — SENNA PLUS 2 TABLET(S): 8.6 TABLET ORAL at 21:51

## 2018-05-29 RX ADMIN — OXYCODONE AND ACETAMINOPHEN 1 TABLET(S): 5; 325 TABLET ORAL at 09:02

## 2018-05-29 RX ADMIN — OXYCODONE AND ACETAMINOPHEN 1 TABLET(S): 5; 325 TABLET ORAL at 21:35

## 2018-05-29 RX ADMIN — Medication 40 MILLIGRAM(S): at 11:26

## 2018-05-29 RX ADMIN — Medication 10 MILLIGRAM(S): at 11:26

## 2018-05-29 RX ADMIN — Medication 100 MILLIGRAM(S): at 08:08

## 2018-05-29 RX ADMIN — HEPARIN SODIUM 5000 UNIT(S): 5000 INJECTION INTRAVENOUS; SUBCUTANEOUS at 08:08

## 2018-05-29 RX ADMIN — OXYCODONE AND ACETAMINOPHEN 1 TABLET(S): 5; 325 TABLET ORAL at 13:07

## 2018-05-29 RX ADMIN — HEPARIN SODIUM 5000 UNIT(S): 5000 INJECTION INTRAVENOUS; SUBCUTANEOUS at 13:54

## 2018-05-29 RX ADMIN — Medication 10 MILLIGRAM(S): at 08:08

## 2018-05-29 RX ADMIN — EFAVIRENZ, EMTRICITABINE AND TENOFOVIR DISOPROXIL FUMARATE 1 TABLET(S): 600; 200; 300 TABLET, FILM COATED ORAL at 21:52

## 2018-05-29 RX ADMIN — PANTOPRAZOLE SODIUM 40 MILLIGRAM(S): 20 TABLET, DELAYED RELEASE ORAL at 08:08

## 2018-05-29 RX ADMIN — Medication 100 MILLIGRAM(S): at 13:54

## 2018-05-30 RX ORDER — OXYCODONE AND ACETAMINOPHEN 5; 325 MG/1; MG/1
1 TABLET ORAL EVERY 4 HOURS
Qty: 0 | Refills: 0 | Status: DISCONTINUED | OUTPATIENT
Start: 2018-05-30 | End: 2018-06-05

## 2018-05-30 RX ORDER — ACETAMINOPHEN 500 MG
650 TABLET ORAL EVERY 4 HOURS
Qty: 0 | Refills: 0 | Status: DISCONTINUED | OUTPATIENT
Start: 2018-05-30 | End: 2018-06-05

## 2018-05-30 RX ADMIN — Medication 100 MILLIGRAM(S): at 15:29

## 2018-05-30 RX ADMIN — OXYCODONE AND ACETAMINOPHEN 1 TABLET(S): 5; 325 TABLET ORAL at 19:24

## 2018-05-30 RX ADMIN — HEPARIN SODIUM 5000 UNIT(S): 5000 INJECTION INTRAVENOUS; SUBCUTANEOUS at 21:57

## 2018-05-30 RX ADMIN — OXYCODONE AND ACETAMINOPHEN 1 TABLET(S): 5; 325 TABLET ORAL at 16:48

## 2018-05-30 RX ADMIN — Medication 40 MILLIGRAM(S): at 12:45

## 2018-05-30 RX ADMIN — OXYCODONE AND ACETAMINOPHEN 1 TABLET(S): 5; 325 TABLET ORAL at 12:44

## 2018-05-30 RX ADMIN — HEPARIN SODIUM 5000 UNIT(S): 5000 INJECTION INTRAVENOUS; SUBCUTANEOUS at 15:30

## 2018-05-30 RX ADMIN — Medication 100 MILLIGRAM(S): at 12:47

## 2018-05-30 RX ADMIN — OXYCODONE AND ACETAMINOPHEN 1 TABLET(S): 5; 325 TABLET ORAL at 06:16

## 2018-05-30 RX ADMIN — OXYCODONE AND ACETAMINOPHEN 1 TABLET(S): 5; 325 TABLET ORAL at 05:34

## 2018-05-30 RX ADMIN — POLYETHYLENE GLYCOL 3350 17 GRAM(S): 17 POWDER, FOR SOLUTION ORAL at 12:47

## 2018-05-30 RX ADMIN — HEPARIN SODIUM 5000 UNIT(S): 5000 INJECTION INTRAVENOUS; SUBCUTANEOUS at 05:34

## 2018-05-30 RX ADMIN — OXYCODONE AND ACETAMINOPHEN 1 TABLET(S): 5; 325 TABLET ORAL at 21:58

## 2018-05-30 RX ADMIN — PANTOPRAZOLE SODIUM 40 MILLIGRAM(S): 20 TABLET, DELAYED RELEASE ORAL at 06:16

## 2018-05-31 RX ADMIN — Medication 100 MILLIGRAM(S): at 12:08

## 2018-05-31 RX ADMIN — PANTOPRAZOLE SODIUM 40 MILLIGRAM(S): 20 TABLET, DELAYED RELEASE ORAL at 08:04

## 2018-05-31 RX ADMIN — HEPARIN SODIUM 5000 UNIT(S): 5000 INJECTION INTRAVENOUS; SUBCUTANEOUS at 05:59

## 2018-05-31 RX ADMIN — Medication 40 MILLIGRAM(S): at 12:07

## 2018-05-31 RX ADMIN — OXYCODONE AND ACETAMINOPHEN 1 TABLET(S): 5; 325 TABLET ORAL at 21:22

## 2018-05-31 RX ADMIN — OXYCODONE AND ACETAMINOPHEN 1 TABLET(S): 5; 325 TABLET ORAL at 17:29

## 2018-05-31 RX ADMIN — Medication 100 MILLIGRAM(S): at 12:07

## 2018-05-31 RX ADMIN — HEPARIN SODIUM 5000 UNIT(S): 5000 INJECTION INTRAVENOUS; SUBCUTANEOUS at 12:08

## 2018-05-31 RX ADMIN — Medication 100 MILLIGRAM(S): at 05:59

## 2018-05-31 RX ADMIN — OXYCODONE AND ACETAMINOPHEN 1 TABLET(S): 5; 325 TABLET ORAL at 12:11

## 2018-05-31 RX ADMIN — POLYETHYLENE GLYCOL 3350 17 GRAM(S): 17 POWDER, FOR SOLUTION ORAL at 12:07

## 2018-05-31 RX ADMIN — OXYCODONE AND ACETAMINOPHEN 1 TABLET(S): 5; 325 TABLET ORAL at 21:18

## 2018-05-31 RX ADMIN — OXYCODONE AND ACETAMINOPHEN 1 TABLET(S): 5; 325 TABLET ORAL at 05:59

## 2018-06-01 RX ADMIN — PANTOPRAZOLE SODIUM 40 MILLIGRAM(S): 20 TABLET, DELAYED RELEASE ORAL at 06:21

## 2018-06-01 RX ADMIN — HEPARIN SODIUM 5000 UNIT(S): 5000 INJECTION INTRAVENOUS; SUBCUTANEOUS at 06:21

## 2018-06-01 RX ADMIN — Medication 100 MILLIGRAM(S): at 06:21

## 2018-06-01 RX ADMIN — HEPARIN SODIUM 5000 UNIT(S): 5000 INJECTION INTRAVENOUS; SUBCUTANEOUS at 12:23

## 2018-06-01 RX ADMIN — Medication 100 MILLIGRAM(S): at 12:23

## 2018-06-01 RX ADMIN — OXYCODONE AND ACETAMINOPHEN 1 TABLET(S): 5; 325 TABLET ORAL at 07:52

## 2018-06-01 RX ADMIN — OXYCODONE AND ACETAMINOPHEN 1 TABLET(S): 5; 325 TABLET ORAL at 16:32

## 2018-06-01 RX ADMIN — POLYETHYLENE GLYCOL 3350 17 GRAM(S): 17 POWDER, FOR SOLUTION ORAL at 12:24

## 2018-06-01 RX ADMIN — OXYCODONE AND ACETAMINOPHEN 1 TABLET(S): 5; 325 TABLET ORAL at 09:59

## 2018-06-01 RX ADMIN — Medication 40 MILLIGRAM(S): at 12:23

## 2018-06-01 RX ADMIN — OXYCODONE AND ACETAMINOPHEN 1 TABLET(S): 5; 325 TABLET ORAL at 12:23

## 2018-06-01 RX ADMIN — HEPARIN SODIUM 5000 UNIT(S): 5000 INJECTION INTRAVENOUS; SUBCUTANEOUS at 21:13

## 2018-06-01 RX ADMIN — OXYCODONE AND ACETAMINOPHEN 1 TABLET(S): 5; 325 TABLET ORAL at 20:16

## 2018-06-01 RX ADMIN — EFAVIRENZ, EMTRICITABINE AND TENOFOVIR DISOPROXIL FUMARATE 1 TABLET(S): 600; 200; 300 TABLET, FILM COATED ORAL at 21:14

## 2018-06-02 RX ADMIN — OXYCODONE AND ACETAMINOPHEN 1 TABLET(S): 5; 325 TABLET ORAL at 13:48

## 2018-06-02 RX ADMIN — POLYETHYLENE GLYCOL 3350 17 GRAM(S): 17 POWDER, FOR SOLUTION ORAL at 12:05

## 2018-06-02 RX ADMIN — OXYCODONE AND ACETAMINOPHEN 1 TABLET(S): 5; 325 TABLET ORAL at 19:36

## 2018-06-02 RX ADMIN — EFAVIRENZ, EMTRICITABINE AND TENOFOVIR DISOPROXIL FUMARATE 1 TABLET(S): 600; 200; 300 TABLET, FILM COATED ORAL at 21:56

## 2018-06-02 RX ADMIN — Medication 100 MILLIGRAM(S): at 12:05

## 2018-06-02 RX ADMIN — HEPARIN SODIUM 5000 UNIT(S): 5000 INJECTION INTRAVENOUS; SUBCUTANEOUS at 06:31

## 2018-06-02 RX ADMIN — Medication 100 MILLIGRAM(S): at 13:31

## 2018-06-02 RX ADMIN — Medication 100 MILLIGRAM(S): at 06:31

## 2018-06-02 RX ADMIN — PANTOPRAZOLE SODIUM 40 MILLIGRAM(S): 20 TABLET, DELAYED RELEASE ORAL at 06:31

## 2018-06-02 RX ADMIN — OXYCODONE AND ACETAMINOPHEN 1 TABLET(S): 5; 325 TABLET ORAL at 08:22

## 2018-06-02 RX ADMIN — HEPARIN SODIUM 5000 UNIT(S): 5000 INJECTION INTRAVENOUS; SUBCUTANEOUS at 21:56

## 2018-06-02 RX ADMIN — Medication 10 MILLIGRAM(S): at 17:26

## 2018-06-02 RX ADMIN — OXYCODONE AND ACETAMINOPHEN 1 TABLET(S): 5; 325 TABLET ORAL at 12:47

## 2018-06-02 RX ADMIN — Medication 40 MILLIGRAM(S): at 12:05

## 2018-06-02 RX ADMIN — OXYCODONE AND ACETAMINOPHEN 1 TABLET(S): 5; 325 TABLET ORAL at 08:46

## 2018-06-02 RX ADMIN — HEPARIN SODIUM 5000 UNIT(S): 5000 INJECTION INTRAVENOUS; SUBCUTANEOUS at 13:31

## 2018-06-02 RX ADMIN — Medication 100 MILLIGRAM(S): at 21:56

## 2018-06-02 RX ADMIN — SENNA PLUS 2 TABLET(S): 8.6 TABLET ORAL at 21:56

## 2018-06-03 RX ADMIN — OXYCODONE AND ACETAMINOPHEN 1 TABLET(S): 5; 325 TABLET ORAL at 17:09

## 2018-06-03 RX ADMIN — Medication 40 MILLIGRAM(S): at 11:47

## 2018-06-03 RX ADMIN — Medication 100 MILLIGRAM(S): at 13:31

## 2018-06-03 RX ADMIN — OXYCODONE AND ACETAMINOPHEN 1 TABLET(S): 5; 325 TABLET ORAL at 23:00

## 2018-06-03 RX ADMIN — HEPARIN SODIUM 5000 UNIT(S): 5000 INJECTION INTRAVENOUS; SUBCUTANEOUS at 13:31

## 2018-06-03 RX ADMIN — OXYCODONE AND ACETAMINOPHEN 1 TABLET(S): 5; 325 TABLET ORAL at 06:23

## 2018-06-03 RX ADMIN — OXYCODONE AND ACETAMINOPHEN 1 TABLET(S): 5; 325 TABLET ORAL at 11:47

## 2018-06-03 RX ADMIN — OXYCODONE AND ACETAMINOPHEN 1 TABLET(S): 5; 325 TABLET ORAL at 17:23

## 2018-06-03 RX ADMIN — Medication 100 MILLIGRAM(S): at 11:47

## 2018-06-03 RX ADMIN — OXYCODONE AND ACETAMINOPHEN 1 TABLET(S): 5; 325 TABLET ORAL at 13:33

## 2018-06-03 RX ADMIN — Medication 100 MILLIGRAM(S): at 06:23

## 2018-06-03 RX ADMIN — OXYCODONE AND ACETAMINOPHEN 1 TABLET(S): 5; 325 TABLET ORAL at 23:01

## 2018-06-03 RX ADMIN — POLYETHYLENE GLYCOL 3350 17 GRAM(S): 17 POWDER, FOR SOLUTION ORAL at 11:47

## 2018-06-03 RX ADMIN — HEPARIN SODIUM 5000 UNIT(S): 5000 INJECTION INTRAVENOUS; SUBCUTANEOUS at 06:23

## 2018-06-03 RX ADMIN — PANTOPRAZOLE SODIUM 40 MILLIGRAM(S): 20 TABLET, DELAYED RELEASE ORAL at 06:23

## 2018-06-04 ENCOUNTER — TRANSCRIPTION ENCOUNTER (OUTPATIENT)
Age: 78
End: 2018-06-04

## 2018-06-04 LAB
ANION GAP SERPL CALC-SCNC: 15 MMOL/L — HIGH (ref 7–14)
BASOPHILS # BLD AUTO: 0.04 K/UL — SIGNIFICANT CHANGE UP (ref 0–0.2)
BASOPHILS NFR BLD AUTO: 0.7 % — SIGNIFICANT CHANGE UP (ref 0–1)
BUN SERPL-MCNC: 32 MG/DL — HIGH (ref 10–20)
CALCIUM SERPL-MCNC: 9.2 MG/DL — SIGNIFICANT CHANGE UP (ref 8.5–10.1)
CHLORIDE SERPL-SCNC: 100 MMOL/L — SIGNIFICANT CHANGE UP (ref 98–110)
CO2 SERPL-SCNC: 21 MMOL/L — SIGNIFICANT CHANGE UP (ref 17–32)
CREAT SERPL-MCNC: 1.3 MG/DL — SIGNIFICANT CHANGE UP (ref 0.7–1.5)
EOSINOPHIL # BLD AUTO: 0.06 K/UL — SIGNIFICANT CHANGE UP (ref 0–0.7)
EOSINOPHIL NFR BLD AUTO: 1.1 % — SIGNIFICANT CHANGE UP (ref 0–8)
GLUCOSE SERPL-MCNC: 90 MG/DL — SIGNIFICANT CHANGE UP (ref 70–99)
HCT VFR BLD CALC: 39 % — LOW (ref 42–52)
HGB BLD-MCNC: 13.1 G/DL — LOW (ref 14–18)
IMM GRANULOCYTES NFR BLD AUTO: 0.6 % — HIGH (ref 0.1–0.3)
LYMPHOCYTES # BLD AUTO: 1.69 K/UL — SIGNIFICANT CHANGE UP (ref 1.2–3.4)
LYMPHOCYTES # BLD AUTO: 31.2 % — SIGNIFICANT CHANGE UP (ref 20.5–51.1)
MCHC RBC-ENTMCNC: 29.9 PG — SIGNIFICANT CHANGE UP (ref 27–31)
MCHC RBC-ENTMCNC: 33.6 G/DL — SIGNIFICANT CHANGE UP (ref 32–37)
MCV RBC AUTO: 89 FL — SIGNIFICANT CHANGE UP (ref 80–94)
MONOCYTES # BLD AUTO: 0.38 K/UL — SIGNIFICANT CHANGE UP (ref 0.1–0.6)
MONOCYTES NFR BLD AUTO: 7 % — SIGNIFICANT CHANGE UP (ref 1.7–9.3)
NEUTROPHILS # BLD AUTO: 3.22 K/UL — SIGNIFICANT CHANGE UP (ref 1.4–6.5)
NEUTROPHILS NFR BLD AUTO: 59.4 % — SIGNIFICANT CHANGE UP (ref 42.2–75.2)
PLATELET # BLD AUTO: 246 K/UL — SIGNIFICANT CHANGE UP (ref 130–400)
POTASSIUM SERPL-MCNC: 4.6 MMOL/L — SIGNIFICANT CHANGE UP (ref 3.5–5)
POTASSIUM SERPL-SCNC: 4.6 MMOL/L — SIGNIFICANT CHANGE UP (ref 3.5–5)
RBC # BLD: 4.38 M/UL — LOW (ref 4.7–6.1)
RBC # FLD: 14.1 % — SIGNIFICANT CHANGE UP (ref 11.5–14.5)
SODIUM SERPL-SCNC: 136 MMOL/L — SIGNIFICANT CHANGE UP (ref 135–146)
WBC # BLD: 5.42 K/UL — SIGNIFICANT CHANGE UP (ref 4.8–10.8)
WBC # FLD AUTO: 5.42 K/UL — SIGNIFICANT CHANGE UP (ref 4.8–10.8)

## 2018-06-04 RX ORDER — METHOCARBAMOL 500 MG/1
500 TABLET, FILM COATED ORAL AT BEDTIME
Qty: 0 | Refills: 0 | Status: DISCONTINUED | OUTPATIENT
Start: 2018-06-04 | End: 2018-06-05

## 2018-06-04 RX ORDER — ACETAMINOPHEN 500 MG
2 TABLET ORAL
Qty: 0 | Refills: 0 | COMMUNITY
Start: 2018-06-04

## 2018-06-04 RX ADMIN — OXYCODONE AND ACETAMINOPHEN 1 TABLET(S): 5; 325 TABLET ORAL at 12:59

## 2018-06-04 RX ADMIN — HEPARIN SODIUM 5000 UNIT(S): 5000 INJECTION INTRAVENOUS; SUBCUTANEOUS at 13:32

## 2018-06-04 RX ADMIN — HEPARIN SODIUM 5000 UNIT(S): 5000 INJECTION INTRAVENOUS; SUBCUTANEOUS at 05:50

## 2018-06-04 RX ADMIN — Medication 40 MILLIGRAM(S): at 11:18

## 2018-06-04 RX ADMIN — OXYCODONE AND ACETAMINOPHEN 1 TABLET(S): 5; 325 TABLET ORAL at 08:22

## 2018-06-04 RX ADMIN — OXYCODONE AND ACETAMINOPHEN 1 TABLET(S): 5; 325 TABLET ORAL at 18:21

## 2018-06-04 RX ADMIN — METHOCARBAMOL 500 MILLIGRAM(S): 500 TABLET, FILM COATED ORAL at 21:48

## 2018-06-04 RX ADMIN — OXYCODONE AND ACETAMINOPHEN 1 TABLET(S): 5; 325 TABLET ORAL at 18:20

## 2018-06-04 RX ADMIN — PANTOPRAZOLE SODIUM 40 MILLIGRAM(S): 20 TABLET, DELAYED RELEASE ORAL at 08:22

## 2018-06-04 RX ADMIN — Medication 100 MILLIGRAM(S): at 11:18

## 2018-06-04 RX ADMIN — Medication 100 MILLIGRAM(S): at 05:50

## 2018-06-04 RX ADMIN — POLYETHYLENE GLYCOL 3350 17 GRAM(S): 17 POWDER, FOR SOLUTION ORAL at 11:19

## 2018-06-04 RX ADMIN — Medication 100 MILLIGRAM(S): at 13:32

## 2018-06-04 RX ADMIN — HEPARIN SODIUM 5000 UNIT(S): 5000 INJECTION INTRAVENOUS; SUBCUTANEOUS at 21:46

## 2018-06-04 NOTE — DISCHARGE NOTE ADULT - MEDICATION SUMMARY - MEDICATIONS TO STOP TAKING
I will STOP taking the medications listed below when I get home from the hospital:    HYDROmorphone  -- 0.5 milligram(s) intravenous every 3 hours, As Needed    heparin  -- 5000 unit(s) subcutaneous every 8 hours    metoclopramide 5 mg/mL injectable solution  -- 10 milligram(s) injectable every 6 hours, As Needed    ondansetron 2 mg/mL injectable solution  -- 4 milligram(s) injectable every 6 hours, As Needed

## 2018-06-04 NOTE — DISCHARGE NOTE ADULT - PATIENT PORTAL LINK FT
You can access the [facility] Patient Portal, offered by [facility], by registering with the following website: [email or web address]

## 2018-06-04 NOTE — DISCHARGE NOTE ADULT - PLAN OF CARE
Cervical Spinal Stenosis - s/p C3-6 posterior decompression and stabilization on 5/19/1  - Post-operatively suffered from nausea/vomiting which was controlled by metoclopramide as needed  - Repeat CT of Cervical spine demonstrating post-op changes but no complications  - Please wear your cervical collar until instructed otherwise HIV - c/w Efavirenz/Emtricitabine/Tenofovir at bedtime

## 2018-06-04 NOTE — DISCHARGE NOTE ADULT - CARE PROVIDER_API CALL
Brent Gaviria), Medicine  [address] [place] [address]  Phone: [phone]  Fax: [phone]    Jermaine Arvizu), Surgical Physicians  [address] [place] [address] [address]  [place] [address]  Phone: [phone]  Fax: [phone]

## 2018-06-04 NOTE — DISCHARGE NOTE ADULT - ADDITIONAL INSTRUCTIONS
Please follow up with your Primary Care Provider, Dr. Gaviria, in 1-2 weeks.  Please follow up with your Neurosurgeon, in 1-2 weeks. Please follow up with your Primary Care Provider, Dr. Gaviria, in 1-2 weeks.  Please follow up with your Neurosurgeon, Dr. Arvizu, in 1-2 weeks.

## 2018-06-04 NOTE — DISCHARGE NOTE ADULT - HOSPITAL COURSE
HPI: 77 y.o. male with a PMHx of HIV, Depression, Shoulder Replacement (R), and Benign stomach tumor presented for ambulation and gait instability. Pt reports having a mechanical fall resulting in some left shoulder trauma. Pt denies loss of consciousness at that time. Pt was being followed by Dr. José Nunn for possible Parkinson's disease and was to get an MRI for bilateral upper extremity tremors. MRI demonstrated mild cord compression and signal abnormalities from C4-6. CT of the cervical spine demonstrated severe spinal canal stenosis from C3-7 with multilevel moderate foraminal narrowing. Pt was taken for C3-6 posterior decompression and stabilization on 5/19. Post-operatively, the patient's hospital stay was complicated by nausea which became well controlled with anti-emetics.    Prior functional status: Ambulatory 50ft with contact guard assist, Bed Mobility and transfers with Justin    Admission Physical Exam:  Vital signs stable. Afebrile  Gen: alert, NAD  Cardio: RRR  Lungs: clear  Abd: soft, NT  Extremities: without edema. PROM wnl.  Neuro: Alert and oriented to time, place, and person  Cranial nerves: II-XII grossly intact  Motor Power: 3/5 in all upper and lower extremities  Sensation: intact    Hospital Course: The patient was admitted to the acute inpatient rehab unit presenting with a decline in functional status. The patient participated in three hours of multidisciplinary therapy 5-6 days per week. The patient was continued on all home medications or equivalent alternatives as deemed appropriate. The patient received prophylactic anticoagulation medication and was monitored closely with no complications. During the stay on the inpatient unit, the patient repeatedly complained of neck and shoulder pain. Neurosurgery was consulted again and a repeat CT of the C-spine was completed. This demonstrated no acute changes. Pt was transitioned from standing reglan to PRN reglan as his emesis resolved. The patient was stable and cleared for discharge by a multidisciplinary team.        Discharge functional status: Bed Mobility Contact Guard, Transfers contact Guard, Ambulatory 150ft with rolling walker and supervision, ADLs with supervision    Discharge disposition:

## 2018-06-05 RX ORDER — ASPIRIN/CALCIUM CARB/MAGNESIUM 324 MG
1 TABLET ORAL
Qty: 30 | Refills: 4 | OUTPATIENT
Start: 2018-06-05 | End: 2018-11-01

## 2018-06-05 RX ORDER — PANTOPRAZOLE SODIUM 20 MG/1
40 TABLET, DELAYED RELEASE ORAL
Qty: 1200 | Refills: 1
Start: 2018-06-05 | End: 2018-08-03

## 2018-06-05 RX ORDER — AMANTADINE HCL 100 MG
1 CAPSULE ORAL
Qty: 30 | Refills: 0
Start: 2018-06-05 | End: 2018-07-04

## 2018-06-05 RX ORDER — ACETAMINOPHEN 500 MG
2 TABLET ORAL
Qty: 100 | Refills: 0
Start: 2018-06-05

## 2018-06-05 RX ORDER — DOCUSATE SODIUM 100 MG
1 CAPSULE ORAL
Qty: 30 | Refills: 1
Start: 2018-06-05 | End: 2018-06-24

## 2018-06-05 RX ORDER — EFAVIRENZ, EMTRICITABINE AND TENOFOVIR DISOPROXIL FUMARATE 600; 200; 300 MG/1; MG/1; MG/1
1 TABLET, FILM COATED ORAL
Qty: 30 | Refills: 0
Start: 2018-06-05 | End: 2018-07-04

## 2018-06-05 RX ORDER — METHOCARBAMOL 500 MG/1
500 TABLET, FILM COATED ORAL THREE TIMES A DAY
Qty: 0 | Refills: 0 | Status: DISCONTINUED | OUTPATIENT
Start: 2018-06-05 | End: 2018-06-05

## 2018-06-05 RX ORDER — METHOCARBAMOL 500 MG/1
1 TABLET, FILM COATED ORAL
Qty: 90 | Refills: 1
Start: 2018-06-05 | End: 2018-08-03

## 2018-06-05 RX ORDER — FLUOXETINE HCL 10 MG
1 CAPSULE ORAL
Qty: 30 | Refills: 0
Start: 2018-06-05 | End: 2018-07-04

## 2018-06-05 RX ADMIN — HEPARIN SODIUM 5000 UNIT(S): 5000 INJECTION INTRAVENOUS; SUBCUTANEOUS at 06:36

## 2018-06-05 RX ADMIN — Medication 100 MILLIGRAM(S): at 06:35

## 2018-06-05 RX ADMIN — PANTOPRAZOLE SODIUM 40 MILLIGRAM(S): 20 TABLET, DELAYED RELEASE ORAL at 06:35

## 2018-06-05 RX ADMIN — Medication 100 MILLIGRAM(S): at 11:31

## 2018-06-05 RX ADMIN — OXYCODONE AND ACETAMINOPHEN 1 TABLET(S): 5; 325 TABLET ORAL at 09:10

## 2018-06-05 RX ADMIN — OXYCODONE AND ACETAMINOPHEN 1 TABLET(S): 5; 325 TABLET ORAL at 10:00

## 2018-06-05 RX ADMIN — METHOCARBAMOL 500 MILLIGRAM(S): 500 TABLET, FILM COATED ORAL at 13:33

## 2018-06-05 RX ADMIN — Medication 40 MILLIGRAM(S): at 11:31

## 2018-06-05 RX ADMIN — Medication 650 MILLIGRAM(S): at 00:49

## 2018-06-05 RX ADMIN — OXYCODONE AND ACETAMINOPHEN 1 TABLET(S): 5; 325 TABLET ORAL at 13:36

## 2018-06-07 PROBLEM — Z00.00 ENCOUNTER FOR PREVENTIVE HEALTH EXAMINATION: Status: ACTIVE | Noted: 2018-06-07

## 2018-06-11 ENCOUNTER — APPOINTMENT (OUTPATIENT)
Dept: NEUROSURGERY | Facility: CLINIC | Age: 78
End: 2018-06-11
Payer: MEDICARE

## 2018-06-11 PROCEDURE — 99024 POSTOP FOLLOW-UP VISIT: CPT

## 2018-06-13 DIAGNOSIS — Z21 ASYMPTOMATIC HUMAN IMMUNODEFICIENCY VIRUS [HIV] INFECTION STATUS: ICD-10-CM

## 2018-06-13 DIAGNOSIS — Z98.1 ARTHRODESIS STATUS: ICD-10-CM

## 2018-06-13 DIAGNOSIS — Z47.89 ENCOUNTER FOR OTHER ORTHOPEDIC AFTERCARE: ICD-10-CM

## 2018-06-13 DIAGNOSIS — G20 PARKINSON'S DISEASE: ICD-10-CM

## 2018-06-13 DIAGNOSIS — F32.9 MAJOR DEPRESSIVE DISORDER, SINGLE EPISODE, UNSPECIFIED: ICD-10-CM

## 2018-06-13 DIAGNOSIS — Z96.611 PRESENCE OF RIGHT ARTIFICIAL SHOULDER JOINT: ICD-10-CM

## 2018-06-13 DIAGNOSIS — I25.10 ATHEROSCLEROTIC HEART DISEASE OF NATIVE CORONARY ARTERY WITHOUT ANGINA PECTORIS: ICD-10-CM

## 2018-07-02 ENCOUNTER — OUTPATIENT (OUTPATIENT)
Dept: OUTPATIENT SERVICES | Facility: HOSPITAL | Age: 78
LOS: 1 days | Discharge: HOME | End: 2018-07-02

## 2018-07-02 ENCOUNTER — APPOINTMENT (OUTPATIENT)
Dept: NEUROSURGERY | Facility: CLINIC | Age: 78
End: 2018-07-02
Payer: OTHER GOVERNMENT

## 2018-07-02 DIAGNOSIS — Z96.611 PRESENCE OF RIGHT ARTIFICIAL SHOULDER JOINT: Chronic | ICD-10-CM

## 2018-07-02 DIAGNOSIS — M54.2 CERVICALGIA: ICD-10-CM

## 2018-07-02 DIAGNOSIS — D13.1 BENIGN NEOPLASM OF STOMACH: Chronic | ICD-10-CM

## 2018-07-02 PROCEDURE — 99024 POSTOP FOLLOW-UP VISIT: CPT

## 2018-07-09 ENCOUNTER — APPOINTMENT (OUTPATIENT)
Dept: NEUROSURGERY | Facility: CLINIC | Age: 78
End: 2018-07-09

## 2018-07-16 ENCOUNTER — APPOINTMENT (OUTPATIENT)
Dept: NEUROSURGERY | Facility: CLINIC | Age: 78
End: 2018-07-16
Payer: MEDICARE

## 2018-07-16 PROCEDURE — 99024 POSTOP FOLLOW-UP VISIT: CPT

## 2018-07-16 NOTE — PROGRESS NOTE ADULT - PROVIDER SPECIALTY LIST ADULT
Patient: Samreen Jacobs    Procedure Summary     Date:  07/16/18 Room / Location:   [facility] OR 09 /  [facility] OR    Anesthesia Start:  1138 Anesthesia Stop:      Procedure:  LAPAROSCOPIC CHOLECYSTECTOMY WITH CHOLANGIOGRAM (N/A Abdomen) Diagnosis:  (BILIARY DYSKINESIA)    Surgeon:  Carol Ellis MD Provider:  Reg Dotson CRNA    Anesthesia Type:  general ASA Status:  1          Anesthesia Type: general  Last vitals  BP   115/59 (07/16/18 1400)   Temp   98.5 °F (36.9 °C) (07/16/18 1345)   Pulse   81 (07/16/18 1400)   Resp   16 (07/16/18 1400)     SpO2   97 % (07/16/18 1400)     Post Anesthesia Care and Evaluation    PONV Status: none  Comments: Patient d/c from PACU prior to anes eval based on Munir score.  Please see RN notes for details of d/c criteria.    Blood pressure 115/59, pulse 81, temperature 98.5 °F (36.9 °C), temperature source Temporal Artery , resp. rate 16, SpO2 97 %, not currently breastfeeding.           Internal Medicine

## 2018-07-17 PROBLEM — F32.9 MAJOR DEPRESSIVE DISORDER, SINGLE EPISODE, UNSPECIFIED: Chronic | Status: ACTIVE | Noted: 2018-03-05

## 2018-07-17 PROBLEM — B20 HUMAN IMMUNODEFICIENCY VIRUS [HIV] DISEASE: Chronic | Status: ACTIVE | Noted: 2018-03-05

## 2018-08-20 ENCOUNTER — APPOINTMENT (OUTPATIENT)
Dept: NEUROSURGERY | Facility: CLINIC | Age: 78
End: 2018-08-20
Payer: MEDICARE

## 2018-08-20 ENCOUNTER — OUTPATIENT (OUTPATIENT)
Dept: OUTPATIENT SERVICES | Facility: HOSPITAL | Age: 78
LOS: 1 days | Discharge: HOME | End: 2018-08-20

## 2018-08-20 DIAGNOSIS — M96.1 POSTLAMINECTOMY SYNDROME, NOT ELSEWHERE CLASSIFIED: ICD-10-CM

## 2018-08-20 DIAGNOSIS — Z96.611 PRESENCE OF RIGHT ARTIFICIAL SHOULDER JOINT: Chronic | ICD-10-CM

## 2018-08-20 DIAGNOSIS — D13.1 BENIGN NEOPLASM OF STOMACH: Chronic | ICD-10-CM

## 2018-08-20 PROCEDURE — 99212 OFFICE O/P EST SF 10 MIN: CPT

## 2018-11-09 ENCOUNTER — OUTPATIENT (OUTPATIENT)
Dept: OUTPATIENT SERVICES | Facility: HOSPITAL | Age: 78
LOS: 1 days | Discharge: HOME | End: 2018-11-09

## 2018-11-09 ENCOUNTER — RESULT REVIEW (OUTPATIENT)
Age: 78
End: 2018-11-09

## 2018-11-09 VITALS
TEMPERATURE: 96 F | HEART RATE: 53 BPM | SYSTOLIC BLOOD PRESSURE: 152 MMHG | DIASTOLIC BLOOD PRESSURE: 85 MMHG | WEIGHT: 113.1 LBS | RESPIRATION RATE: 20 BRPM | HEIGHT: 63 IN

## 2018-11-09 VITALS — SYSTOLIC BLOOD PRESSURE: 146 MMHG | RESPIRATION RATE: 18 BRPM | HEART RATE: 52 BPM | DIASTOLIC BLOOD PRESSURE: 70 MMHG

## 2018-11-09 DIAGNOSIS — R11.2 NAUSEA WITH VOMITING, UNSPECIFIED: ICD-10-CM

## 2018-11-09 DIAGNOSIS — D13.1 BENIGN NEOPLASM OF STOMACH: Chronic | ICD-10-CM

## 2018-11-09 DIAGNOSIS — Z96.611 PRESENCE OF RIGHT ARTIFICIAL SHOULDER JOINT: Chronic | ICD-10-CM

## 2018-11-12 LAB — SURGICAL PATHOLOGY STUDY: SIGNIFICANT CHANGE UP

## 2018-11-13 LAB — SURGICAL PATHOLOGY STUDY: SIGNIFICANT CHANGE UP

## 2018-11-14 DIAGNOSIS — Z12.11 ENCOUNTER FOR SCREENING FOR MALIGNANT NEOPLASM OF COLON: ICD-10-CM

## 2018-11-14 DIAGNOSIS — K21.9 GASTRO-ESOPHAGEAL REFLUX DISEASE WITHOUT ESOPHAGITIS: ICD-10-CM

## 2018-11-14 DIAGNOSIS — F32.9 MAJOR DEPRESSIVE DISORDER, SINGLE EPISODE, UNSPECIFIED: ICD-10-CM

## 2018-11-14 DIAGNOSIS — K21.0 GASTRO-ESOPHAGEAL REFLUX DISEASE WITH ESOPHAGITIS: ICD-10-CM

## 2018-11-14 DIAGNOSIS — K64.4 RESIDUAL HEMORRHOIDAL SKIN TAGS: ICD-10-CM

## 2018-11-14 DIAGNOSIS — K63.89 OTHER SPECIFIED DISEASES OF INTESTINE: ICD-10-CM

## 2018-11-14 DIAGNOSIS — K29.50 UNSPECIFIED CHRONIC GASTRITIS WITHOUT BLEEDING: ICD-10-CM

## 2018-11-14 DIAGNOSIS — Z86.010 PERSONAL HISTORY OF COLONIC POLYPS: ICD-10-CM

## 2018-11-14 DIAGNOSIS — K64.8 OTHER HEMORRHOIDS: ICD-10-CM

## 2018-11-14 DIAGNOSIS — K44.9 DIAPHRAGMATIC HERNIA WITHOUT OBSTRUCTION OR GANGRENE: ICD-10-CM

## 2018-11-14 DIAGNOSIS — R14.0 ABDOMINAL DISTENSION (GASEOUS): ICD-10-CM

## 2018-11-14 DIAGNOSIS — Z96.611 PRESENCE OF RIGHT ARTIFICIAL SHOULDER JOINT: ICD-10-CM

## 2018-11-14 DIAGNOSIS — B20 HUMAN IMMUNODEFICIENCY VIRUS [HIV] DISEASE: ICD-10-CM

## 2018-11-14 DIAGNOSIS — K57.30 DIVERTICULOSIS OF LARGE INTESTINE WITHOUT PERFORATION OR ABSCESS WITHOUT BLEEDING: ICD-10-CM

## 2018-12-05 ENCOUNTER — APPOINTMENT (OUTPATIENT)
Dept: NEUROSURGERY | Facility: CLINIC | Age: 78
End: 2018-12-05

## 2018-12-19 ENCOUNTER — APPOINTMENT (OUTPATIENT)
Dept: NEUROSURGERY | Facility: CLINIC | Age: 78
End: 2018-12-19

## 2019-02-06 ENCOUNTER — APPOINTMENT (OUTPATIENT)
Dept: NEUROSURGERY | Facility: CLINIC | Age: 79
End: 2019-02-06
Payer: MEDICARE

## 2019-02-06 DIAGNOSIS — M48.02 SPINAL STENOSIS, CERVICAL REGION: ICD-10-CM

## 2019-02-06 DIAGNOSIS — R42 DIZZINESS AND GIDDINESS: ICD-10-CM

## 2019-02-06 DIAGNOSIS — Z21 ASYMPTOMATIC HUMAN IMMUNODEFICIENCY VIRUS [HIV] INFECTION STATUS: ICD-10-CM

## 2019-02-06 DIAGNOSIS — G99.2 SPINAL STENOSIS, CERVICAL REGION: ICD-10-CM

## 2019-02-06 DIAGNOSIS — Z78.9 OTHER SPECIFIED HEALTH STATUS: ICD-10-CM

## 2019-02-06 PROCEDURE — 99214 OFFICE O/P EST MOD 30 MIN: CPT

## 2019-02-07 ENCOUNTER — FORM ENCOUNTER (OUTPATIENT)
Age: 79
End: 2019-02-07

## 2019-02-07 PROBLEM — R42 DIZZINESS: Status: ACTIVE | Noted: 2019-02-07

## 2019-02-07 PROBLEM — Z21 ASYMPTOMATIC HIV INFECTION: Status: RESOLVED | Noted: 2019-02-07 | Resolved: 2019-02-07

## 2019-02-07 PROBLEM — Z78.9 NON-SMOKER: Status: ACTIVE | Noted: 2019-02-07

## 2019-02-07 PROBLEM — M48.02 STENOSIS OF CERVICAL SPINE WITH MYELOPATHY: Status: RESOLVED | Noted: 2019-02-07 | Resolved: 2019-02-07

## 2019-02-07 RX ORDER — MECLIZINE HYDROCHLORIDE 25 MG/1
TABLET ORAL
Refills: 0 | Status: ACTIVE | COMMUNITY

## 2019-02-07 NOTE — HISTORY OF PRESENT ILLNESS
[FreeTextEntry1] : Mr. Desai is here s/p posterior cervical decompression and fusion for severe cord compression, myelopathy. He has healed well from his surgery. he continues to have dizziness and N/V. He has been seen by Dr. Collado an HIV specialist at [facility] who can find no cause, he has been seen by ENT, neuro and neuro-otology at [facility] who have concluded that it is likely of central source and there is no treatment. Meclizine has not helped. I will obtain a new MRI of the C-spine as f/u after his surgery though it is unlikely that this is the source.

## 2019-02-07 NOTE — PHYSICAL EXAM
[FreeTextEntry1] : Constitutional: Well appearing, no distress\par HEENT: Normocephalic Atraumatic\par Psychiatric: Alert and oriented to all spheres, normal mood\par Pulmonary: no respiratory distress\par Abdomen: non-distended\par Vascular/Extremities: no edema, no cyanosis, no clubbing\par \par \par Neurologic: \par CN II-XII grossly intact\par ROM: Full in cervical and lumbar spine\par Palpation: no pain to palpation in cervical spine, no pain to palpation in lumbar spine\par Strength: Full strength in all major muscle groups, no atrophy\par Sensation: Full sensation to light touch in all extremities\par Reflexes: \par              \par            bilateral hoffmans\par              No clonus\par              No babinski\par \par Signs:\par SLR negative\par L'hermitte's negative\par \par Gait: toe, heel, tandem fluid\par \par \par \par \par

## 2019-02-08 ENCOUNTER — OUTPATIENT (OUTPATIENT)
Dept: OUTPATIENT SERVICES | Facility: HOSPITAL | Age: 79
LOS: 1 days | Discharge: HOME | End: 2019-02-08

## 2019-02-08 DIAGNOSIS — Z96.611 PRESENCE OF RIGHT ARTIFICIAL SHOULDER JOINT: Chronic | ICD-10-CM

## 2019-02-08 DIAGNOSIS — D13.1 BENIGN NEOPLASM OF STOMACH: Chronic | ICD-10-CM

## 2019-02-08 DIAGNOSIS — M54.2 CERVICALGIA: ICD-10-CM

## 2019-03-11 ENCOUNTER — APPOINTMENT (OUTPATIENT)
Dept: NEUROSURGERY | Facility: CLINIC | Age: 79
End: 2019-03-11
Payer: MEDICARE

## 2019-03-11 DIAGNOSIS — M54.12 RADICULOPATHY, CERVICAL REGION: ICD-10-CM

## 2019-03-11 DIAGNOSIS — M54.16 RADICULOPATHY, LUMBAR REGION: ICD-10-CM

## 2019-03-11 PROCEDURE — 99214 OFFICE O/P EST MOD 30 MIN: CPT

## 2019-03-11 RX ORDER — BACLOFEN 10 MG/1
10 TABLET ORAL EVERY 8 HOURS
Qty: 90 | Refills: 0 | Status: ACTIVE | COMMUNITY
Start: 2019-03-11 | End: 1900-01-01

## 2019-03-11 RX ORDER — CHLORZOXAZONE 500 MG/1
500 TABLET ORAL EVERY 8 HOURS
Qty: 90 | Refills: 0 | Status: ACTIVE | COMMUNITY
Start: 2019-03-11 | End: 1900-01-01

## 2019-03-13 ENCOUNTER — APPOINTMENT (OUTPATIENT)
Dept: NEUROSURGERY | Facility: CLINIC | Age: 79
End: 2019-03-13

## 2019-03-13 RX ORDER — NAPROXEN 500 MG/1
500 TABLET ORAL
Qty: 60 | Refills: 2 | Status: ACTIVE | COMMUNITY
Start: 2019-03-13 | End: 1900-01-01

## 2019-04-11 PROBLEM — M54.12 RADICULOPATHY, CERVICAL REGION: Status: ACTIVE | Noted: 2019-04-11

## 2019-04-11 PROBLEM — M54.16 RADICULOPATHY, LUMBAR REGION: Status: ACTIVE | Noted: 2019-04-11

## 2019-04-11 NOTE — HISTORY OF PRESENT ILLNESS
[FreeTextEntry1] : Mr. Pompa is here with new complaints of severe stifness and spasm in the BLE with pain radiating into the bilateral hamstrings. 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 Repeat C spine MRI shows worsened stenosis at the C6-7 adjacent level. I do not believe this to be the source of his dizzines. He does have mult-level degenerativ LS disease with foraminal stenosis on lumbar MRI.

## 2019-04-11 NOTE — PHYSICAL EXAM
[FreeTextEntry1] : On exam today he has significant cramping in the BLE otherwise strenght is intact in at ext. Incision is well healed.

## 2019-05-03 NOTE — H&P ADULT - NSICDXPASTSURGICALHX_GEN_ALL_CORE_FT
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

## 2019-08-26 ENCOUNTER — INPATIENT (INPATIENT)
Facility: HOSPITAL | Age: 79
LOS: 3 days | Discharge: HOME | End: 2019-08-30
Attending: INTERNAL MEDICINE | Admitting: INTERNAL MEDICINE
Payer: MEDICARE

## 2019-08-26 VITALS
TEMPERATURE: 97 F | RESPIRATION RATE: 18 BRPM | DIASTOLIC BLOOD PRESSURE: 86 MMHG | HEART RATE: 62 BPM | SYSTOLIC BLOOD PRESSURE: 118 MMHG | OXYGEN SATURATION: 100 %

## 2019-08-26 DIAGNOSIS — D13.1 BENIGN NEOPLASM OF STOMACH: Chronic | ICD-10-CM

## 2019-08-26 DIAGNOSIS — R09.89 OTHER SPECIFIED SYMPTOMS AND SIGNS INVOLVING THE CIRCULATORY AND RESPIRATORY SYSTEMS: ICD-10-CM

## 2019-08-26 DIAGNOSIS — Z96.611 PRESENCE OF RIGHT ARTIFICIAL SHOULDER JOINT: Chronic | ICD-10-CM
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
## 2019-08-26 DIAGNOSIS — Z98.890 OTHER SPECIFIED POSTPROCEDURAL STATES: Chronic | ICD-10-CM
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
## 2019-08-26 LAB
ALBUMIN SERPL ELPH-MCNC: 4.5 G/DL — SIGNIFICANT CHANGE UP (ref 3.5–5.2)
ALP SERPL-CCNC: 64 U/L — SIGNIFICANT CHANGE UP (ref 30–115)
ALT FLD-CCNC: 11 U/L — SIGNIFICANT CHANGE UP (ref 0–41)
ANION GAP SERPL CALC-SCNC: 17 MMOL/L — HIGH (ref 7–14)
APPEARANCE UR: ABNORMAL
APTT BLD: 31.6 SEC — SIGNIFICANT CHANGE UP (ref 27–39.2)
AST SERPL-CCNC: 16 U/L — SIGNIFICANT CHANGE UP (ref 0–41)
BACTERIA # UR AUTO: ABNORMAL
BASE EXCESS BLDV CALC-SCNC: -1.3 MMOL/L — SIGNIFICANT CHANGE UP (ref -2–2)
BASOPHILS # BLD AUTO: 0.04 K/UL — SIGNIFICANT CHANGE UP (ref 0–0.2)
BASOPHILS NFR BLD AUTO: 0.4 % — SIGNIFICANT CHANGE UP (ref 0–1)
BILIRUB SERPL-MCNC: 0.9 MG/DL — SIGNIFICANT CHANGE UP (ref 0.2–1.2)
BILIRUB UR-MCNC: NEGATIVE — SIGNIFICANT CHANGE UP
BUN SERPL-MCNC: 25 MG/DL — HIGH (ref 10–20)
CA-I SERPL-SCNC: 1.21 MMOL/L — SIGNIFICANT CHANGE UP (ref 1.12–1.3)
CALCIUM SERPL-MCNC: 10 MG/DL — SIGNIFICANT CHANGE UP (ref 8.5–10.1)
CHLORIDE SERPL-SCNC: 105 MMOL/L — SIGNIFICANT CHANGE UP (ref 98–110)
CO2 SERPL-SCNC: 17 MMOL/L — SIGNIFICANT CHANGE UP (ref 17–32)
COLOR SPEC: YELLOW — SIGNIFICANT CHANGE UP
CREAT SERPL-MCNC: 1.5 MG/DL — SIGNIFICANT CHANGE UP (ref 0.7–1.5)
D DIMER BLD IA.RAPID-MCNC: 2264 NG/ML DDU — HIGH (ref 0–230)
DIFF PNL FLD: ABNORMAL
EOSINOPHIL # BLD AUTO: 0.06 K/UL — SIGNIFICANT CHANGE UP (ref 0–0.7)
EOSINOPHIL NFR BLD AUTO: 0.6 % — SIGNIFICANT CHANGE UP (ref 0–8)
EPI CELLS # UR: SIGNIFICANT CHANGE UP /HPF (ref 0–5)
GAS PNL BLDV: 139 MMOL/L — SIGNIFICANT CHANGE UP (ref 136–145)
GAS PNL BLDV: SIGNIFICANT CHANGE UP
GAS PNL BLDV: SIGNIFICANT CHANGE UP
GLUCOSE SERPL-MCNC: 130 MG/DL — HIGH (ref 70–99)
GLUCOSE UR QL: NEGATIVE — SIGNIFICANT CHANGE UP
HCT VFR BLD CALC: 46 % — SIGNIFICANT CHANGE UP (ref 42–52)
HCT VFR BLDA CALC: 56 % — HIGH (ref 34–44)
HGB BLD CALC-MCNC: 18 G/DL — SIGNIFICANT CHANGE UP (ref 14–18)
HGB BLD-MCNC: 15.6 G/DL — SIGNIFICANT CHANGE UP (ref 14–18)
IMM GRANULOCYTES NFR BLD AUTO: 0.3 % — SIGNIFICANT CHANGE UP (ref 0.1–0.3)
INR BLD: 1.06 RATIO — SIGNIFICANT CHANGE UP (ref 0.65–1.3)
KETONES UR-MCNC: NEGATIVE — SIGNIFICANT CHANGE UP
LACTATE BLDV-MCNC: 3.6 MMOL/L — HIGH (ref 0.5–1.6)
LACTATE SERPL-SCNC: 1.3 MMOL/L — SIGNIFICANT CHANGE UP (ref 0.5–2.2)
LACTATE SERPL-SCNC: 3.8 MMOL/L — HIGH (ref 0.5–2.2)
LEUKOCYTE ESTERASE UR-ACNC: ABNORMAL
LIDOCAIN IGE QN: 21 U/L — SIGNIFICANT CHANGE UP (ref 7–60)
LYMPHOCYTES # BLD AUTO: 1.85 K/UL — SIGNIFICANT CHANGE UP (ref 1.2–3.4)
LYMPHOCYTES # BLD AUTO: 18.5 % — LOW (ref 20.5–51.1)
MCHC RBC-ENTMCNC: 31.4 PG — HIGH (ref 27–31)
MCHC RBC-ENTMCNC: 33.9 G/DL — SIGNIFICANT CHANGE UP (ref 32–37)
MCV RBC AUTO: 92.6 FL — SIGNIFICANT CHANGE UP (ref 80–94)
MONOCYTES # BLD AUTO: 0.9 K/UL — HIGH (ref 0.1–0.6)
MONOCYTES NFR BLD AUTO: 9 % — SIGNIFICANT CHANGE UP (ref 1.7–9.3)
NEUTROPHILS # BLD AUTO: 7.12 K/UL — HIGH (ref 1.4–6.5)
NEUTROPHILS NFR BLD AUTO: 71.2 % — SIGNIFICANT CHANGE UP (ref 42.2–75.2)
NITRITE UR-MCNC: POSITIVE
NRBC # BLD: 0 /100 WBCS — SIGNIFICANT CHANGE UP (ref 0–0)
NT-PROBNP SERPL-SCNC: 350 PG/ML — HIGH (ref 0–300)
PCO2 BLDV: 28 MMHG — LOW (ref 41–51)
PH BLDV: 7.48 — HIGH (ref 7.26–7.43)
PH UR: 8.5 — HIGH (ref 5–8)
PLATELET # BLD AUTO: 205 K/UL — SIGNIFICANT CHANGE UP (ref 130–400)
PO2 BLDV: 16 MMHG — LOW (ref 20–40)
POTASSIUM BLDV-SCNC: 3.9 MMOL/L — SIGNIFICANT CHANGE UP (ref 3.3–5.6)
POTASSIUM SERPL-MCNC: 4.3 MMOL/L — SIGNIFICANT CHANGE UP (ref 3.5–5)
POTASSIUM SERPL-SCNC: 4.3 MMOL/L — SIGNIFICANT CHANGE UP (ref 3.5–5)
PROT SERPL-MCNC: 7.3 G/DL — SIGNIFICANT CHANGE UP (ref 6–8)
PROT UR-MCNC: 30
PROTHROM AB SERPL-ACNC: 12.2 SEC — SIGNIFICANT CHANGE UP (ref 9.95–12.87)
RBC # BLD: 4.97 M/UL — SIGNIFICANT CHANGE UP (ref 4.7–6.1)
RBC # FLD: 13.5 % — SIGNIFICANT CHANGE UP (ref 11.5–14.5)
RBC CASTS # UR COMP ASSIST: SIGNIFICANT CHANGE UP /HPF (ref 0–4)
SAO2 % BLDV: 27 % — SIGNIFICANT CHANGE UP
SODIUM SERPL-SCNC: 139 MMOL/L — SIGNIFICANT CHANGE UP (ref 135–146)
SP GR SPEC: >=1.03 — SIGNIFICANT CHANGE UP (ref 1.01–1.03)
TROPONIN T SERPL-MCNC: <0.01 NG/ML — SIGNIFICANT CHANGE UP
UROBILINOGEN FLD QL: 1 (ref 0.2–0.2)
WBC # BLD: 10 K/UL — SIGNIFICANT CHANGE UP (ref 4.8–10.8)
WBC # FLD AUTO: 10 K/UL — SIGNIFICANT CHANGE UP (ref 4.8–10.8)
WBC UR QL: >50 /HPF — HIGH (ref 0–5)

## 2019-08-26 PROCEDURE — 72125 CT NECK SPINE W/O DYE: CPT | Mod: 26

## 2019-08-26 PROCEDURE — 99285 EMERGENCY DEPT VISIT HI MDM: CPT | Mod: 25,GC

## 2019-08-26 PROCEDURE — 71275 CT ANGIOGRAPHY CHEST: CPT | Mod: 26

## 2019-08-26 PROCEDURE — 72170 X-RAY EXAM OF PELVIS: CPT | Mod: 26

## 2019-08-26 PROCEDURE — 93971 EXTREMITY STUDY: CPT | Mod: 26

## 2019-08-26 PROCEDURE — 51702 INSERT TEMP BLADDER CATH: CPT | Mod: 59,GC

## 2019-08-26 PROCEDURE — 76857 US EXAM PELVIC LIMITED: CPT | Mod: 26,GC

## 2019-08-26 PROCEDURE — 70450 CT HEAD/BRAIN W/O DYE: CPT | Mod: 26

## 2019-08-26 PROCEDURE — 73562 X-RAY EXAM OF KNEE 3: CPT | Mod: 26,RT

## 2019-08-26 PROCEDURE — 73660 X-RAY EXAM OF TOE(S): CPT | Mod: 26,RT

## 2019-08-26 PROCEDURE — 93010 ELECTROCARDIOGRAM REPORT: CPT | Mod: 77

## 2019-08-26 PROCEDURE — 71045 X-RAY EXAM CHEST 1 VIEW: CPT | Mod: 26

## 2019-08-26 PROCEDURE — 74177 CT ABD & PELVIS W/CONTRAST: CPT | Mod: 26

## 2019-08-26 PROCEDURE — 93010 ELECTROCARDIOGRAM REPORT: CPT

## 2019-08-26 RX ORDER — ACETAMINOPHEN 500 MG
650 TABLET ORAL EVERY 6 HOURS
Refills: 0 | Status: DISCONTINUED | OUTPATIENT
Start: 2019-08-26 | End: 2019-08-30

## 2019-08-26 RX ORDER — ONDANSETRON 8 MG/1
4 TABLET, FILM COATED ORAL ONCE
Refills: 0 | Status: COMPLETED | OUTPATIENT
Start: 2019-08-26 | End: 2019-08-26

## 2019-08-26 RX ORDER — PANTOPRAZOLE SODIUM 20 MG/1
40 TABLET, DELAYED RELEASE ORAL
Refills: 0 | Status: DISCONTINUED | OUTPATIENT
Start: 2019-08-26 | End: 2019-08-30

## 2019-08-26 RX ORDER — CEFTRIAXONE 500 MG/1
1000 INJECTION, POWDER, FOR SOLUTION INTRAMUSCULAR; INTRAVENOUS EVERY 24 HOURS
Refills: 0 | Status: DISCONTINUED | OUTPATIENT
Start: 2019-08-26 | End: 2019-08-29

## 2019-08-26 RX ORDER — PANTOPRAZOLE SODIUM 20 MG/1
40 TABLET, DELAYED RELEASE ORAL ONCE
Refills: 0 | Status: COMPLETED | OUTPATIENT
Start: 2019-08-26 | End: 2019-08-26

## 2019-08-26 RX ORDER — CHLORHEXIDINE GLUCONATE 213 G/1000ML
1 SOLUTION TOPICAL
Refills: 0 | Status: DISCONTINUED | OUTPATIENT
Start: 2019-08-26 | End: 2019-08-30

## 2019-08-26 RX ORDER — SODIUM CHLORIDE 9 MG/ML
1000 INJECTION INTRAMUSCULAR; INTRAVENOUS; SUBCUTANEOUS
Refills: 0 | Status: DISCONTINUED | OUTPATIENT
Start: 2019-08-26 | End: 2019-08-27

## 2019-08-26 RX ORDER — LACTULOSE 10 G/15ML
20 SOLUTION ORAL EVERY 4 HOURS
Refills: 0 | Status: COMPLETED | OUTPATIENT
Start: 2019-08-26 | End: 2019-08-27

## 2019-08-26 RX ORDER — TETANUS TOXOID, REDUCED DIPHTHERIA TOXOID AND ACELLULAR PERTUSSIS VACCINE, ADSORBED 5; 2.5; 8; 8; 2.5 [IU]/.5ML; [IU]/.5ML; UG/.5ML; UG/.5ML; UG/.5ML
0.5 SUSPENSION INTRAMUSCULAR ONCE
Refills: 0 | Status: COMPLETED | OUTPATIENT
Start: 2019-08-26 | End: 2019-08-26

## 2019-08-26 RX ORDER — MECLIZINE HCL 12.5 MG
12.5 TABLET ORAL THREE TIMES A DAY
Refills: 0 | Status: DISCONTINUED | OUTPATIENT
Start: 2019-08-26 | End: 2019-08-30

## 2019-08-26 RX ORDER — ENOXAPARIN SODIUM 100 MG/ML
40 INJECTION SUBCUTANEOUS DAILY
Refills: 0 | Status: DISCONTINUED | OUTPATIENT
Start: 2019-08-26 | End: 2019-08-30

## 2019-08-26 RX ORDER — CARBIDOPA AND LEVODOPA 25; 100 MG/1; MG/1
2 TABLET ORAL AT BEDTIME
Refills: 0 | Status: DISCONTINUED | OUTPATIENT
Start: 2019-08-26 | End: 2019-08-28

## 2019-08-26 RX ORDER — SODIUM CHLORIDE 9 MG/ML
1000 INJECTION, SOLUTION INTRAVENOUS ONCE
Refills: 0 | Status: COMPLETED | OUTPATIENT
Start: 2019-08-26 | End: 2019-08-26

## 2019-08-26 RX ORDER — ABACAVIR SULFATE, DOLUTEGRAVIR SODIUM, LAMIVUDINE 60-5-30 MG
1 KIT ORAL
Qty: 0 | Refills: 0 | DISCHARGE

## 2019-08-26 RX ORDER — DOCUSATE SODIUM 100 MG
100 CAPSULE ORAL THREE TIMES A DAY
Refills: 0 | Status: DISCONTINUED | OUTPATIENT
Start: 2019-08-26 | End: 2019-08-30

## 2019-08-26 RX ORDER — RANITIDINE HYDROCHLORIDE 150 MG/1
1 TABLET, FILM COATED ORAL
Qty: 0 | Refills: 0 | DISCHARGE

## 2019-08-26 RX ORDER — PROCHLORPERAZINE MALEATE 5 MG
10 TABLET ORAL EVERY 8 HOURS
Refills: 0 | Status: DISCONTINUED | OUTPATIENT
Start: 2019-08-26 | End: 2019-08-28

## 2019-08-26 RX ORDER — ACETAMINOPHEN 500 MG
650 TABLET ORAL ONCE
Refills: 0 | Status: COMPLETED | OUTPATIENT
Start: 2019-08-26 | End: 2019-08-26

## 2019-08-26 RX ORDER — CARBIDOPA AND LEVODOPA 25; 100 MG/1; MG/1
3 TABLET ORAL
Refills: 0 | Status: DISCONTINUED | OUTPATIENT
Start: 2019-08-26 | End: 2019-08-28

## 2019-08-26 RX ORDER — MORPHINE SULFATE 50 MG/1
4 CAPSULE, EXTENDED RELEASE ORAL ONCE
Refills: 0 | Status: DISCONTINUED | OUTPATIENT
Start: 2019-08-26 | End: 2019-08-26

## 2019-08-26 RX ORDER — FLUOXETINE HCL 10 MG
1 CAPSULE ORAL
Qty: 0 | Refills: 0 | DISCHARGE

## 2019-08-26 RX ORDER — SENNA PLUS 8.6 MG/1
2 TABLET ORAL AT BEDTIME
Refills: 0 | Status: DISCONTINUED | OUTPATIENT
Start: 2019-08-26 | End: 2019-08-30

## 2019-08-26 RX ORDER — CARBIDOPA AND LEVODOPA 25; 100 MG/1; MG/1
3 TABLET ORAL
Qty: 0 | Refills: 0 | DISCHARGE

## 2019-08-26 RX ORDER — CEFTRIAXONE 500 MG/1
1000 INJECTION, POWDER, FOR SOLUTION INTRAMUSCULAR; INTRAVENOUS ONCE
Refills: 0 | Status: COMPLETED | OUTPATIENT
Start: 2019-08-26 | End: 2019-08-26

## 2019-08-26 RX ADMIN — Medication 10 MILLIGRAM(S): at 18:57

## 2019-08-26 RX ADMIN — MORPHINE SULFATE 4 MILLIGRAM(S): 50 CAPSULE, EXTENDED RELEASE ORAL at 10:13

## 2019-08-26 RX ADMIN — SENNA PLUS 2 TABLET(S): 8.6 TABLET ORAL at 22:32

## 2019-08-26 RX ADMIN — CEFTRIAXONE 100 MILLIGRAM(S): 500 INJECTION, POWDER, FOR SOLUTION INTRAMUSCULAR; INTRAVENOUS at 17:40

## 2019-08-26 RX ADMIN — ONDANSETRON 4 MILLIGRAM(S): 8 TABLET, FILM COATED ORAL at 08:38

## 2019-08-26 RX ADMIN — SODIUM CHLORIDE 1000 MILLILITER(S): 9 INJECTION, SOLUTION INTRAVENOUS at 09:07

## 2019-08-26 RX ADMIN — Medication 650 MILLIGRAM(S): at 10:00

## 2019-08-26 RX ADMIN — LACTULOSE 20 GRAM(S): 10 SOLUTION ORAL at 22:32

## 2019-08-26 RX ADMIN — SODIUM CHLORIDE 1000 MILLILITER(S): 9 INJECTION, SOLUTION INTRAVENOUS at 14:04

## 2019-08-26 RX ADMIN — SODIUM CHLORIDE 75 MILLILITER(S): 9 INJECTION INTRAMUSCULAR; INTRAVENOUS; SUBCUTANEOUS at 18:57

## 2019-08-26 RX ADMIN — CEFTRIAXONE 1000 MILLIGRAM(S): 500 INJECTION, POWDER, FOR SOLUTION INTRAMUSCULAR; INTRAVENOUS at 18:38

## 2019-08-26 RX ADMIN — ONDANSETRON 4 MILLIGRAM(S): 8 TABLET, FILM COATED ORAL at 14:01

## 2019-08-26 RX ADMIN — SODIUM CHLORIDE 1000 MILLILITER(S): 9 INJECTION, SOLUTION INTRAVENOUS at 08:38

## 2019-08-26 RX ADMIN — CARBIDOPA AND LEVODOPA 2 TABLET(S): 25; 100 TABLET ORAL at 22:32

## 2019-08-26 RX ADMIN — Medication 650 MILLIGRAM(S): at 09:06

## 2019-08-26 RX ADMIN — TETANUS TOXOID, REDUCED DIPHTHERIA TOXOID AND ACELLULAR PERTUSSIS VACCINE, ADSORBED 0.5 MILLILITER(S): 5; 2.5; 8; 8; 2.5 SUSPENSION INTRAMUSCULAR at 08:51

## 2019-08-26 RX ADMIN — SODIUM CHLORIDE 1000 MILLILITER(S): 9 INJECTION, SOLUTION INTRAVENOUS at 11:46

## 2019-08-26 RX ADMIN — Medication 100 MILLIGRAM(S): at 22:32

## 2019-08-26 RX ADMIN — MORPHINE SULFATE 4 MILLIGRAM(S): 50 CAPSULE, EXTENDED RELEASE ORAL at 10:45

## 2019-08-26 RX ADMIN — SODIUM CHLORIDE 75 MILLILITER(S): 9 INJECTION INTRAMUSCULAR; INTRAVENOUS; SUBCUTANEOUS at 18:46

## 2019-08-26 RX ADMIN — PANTOPRAZOLE SODIUM 40 MILLIGRAM(S): 20 TABLET, DELAYED RELEASE ORAL at 14:01

## 2019-08-26 NOTE — ED PROVIDER NOTE - NS ED ROS FT
Eyes:  No visual changes, eye pain or discharge.  ENMT:  No hearing changes, pain, no sore throat or runny nose, no difficulty swallowing  Cardiac:  No chest pain, SOB or edema. No chest pain with exertion.  Respiratory:  No cough or respiratory distress. No hemoptysis. No history of asthma or RAD.  GI:  No diarrhea or abdominal pain. + nausea, vomiting  :  No dysuria, frequency or burning.  MS:  No myalgia, muscle weakness, joint pain or back pain.  Neuro:  No headache or weakness.  + LOC.  Skin:  No skin rash.   Endocrine: No history of thyroid disease or diabetes.

## 2019-08-26 NOTE — ED ADULT NURSE NOTE - INTERVENTIONS DEFINITIONS
Stretcher in lowest position, wheels locked, appropriate side rails in place/Provide visual cue, wrist band, yellow gown, etc./Provide visual clues: red socks/Monitor for mental status changes and reorient to person, place, and time/[place] to call system/Physically safe environment: no spills, clutter or unnecessary equipment/Monitor gait and stability

## 2019-08-26 NOTE — ED PROCEDURE NOTE - NS ED ATTENDING STATEMENT MOD
Attending Only
I have personally seen and examined this patient. I have fully participated in the care of this patient. I have reviewed all pertinent clinical information, including history, physical exam, plan and the Medical Student's note and agree except as noted.
Attending Only

## 2019-08-26 NOTE — ED PROVIDER NOTE - ATTENDING CONTRIBUTION TO CARE
I personally evaluated patient. I agree with the findings and plan with all documentation on chart except as documented  in my note.    79 y/o M w/ PMH of HIV on HAART (Last CD4 496 05/2018), Chronic neck pain with Cervical fusion, Parkinsons?, Depression who presents with syncopal episode yesterday night. States was about to eat a sandwich when he suddenly blacked out. + right sided chest pain for the past 3 days. Unwitnessed fall. + LOC. Had bleeding from posterior head afterwards. Noticed increased pain to RLE when weight bearing. Was not able to get to the hospital due to transportation issues. This morning developed lightheadedness and nausea without vomiting. Denies fever, chills, SOB, abd pain, urinary/bowel incontinence, or numbness/tingling.    On exam, VS reviewed.  Patient reports pain.  Neuro exam is non-focal. + Tenderness to right hamstring, right calf, right 5th toe. EKG done and reviewed, is non-ischemic and patient in NSR. + Occipital scalp laceration.  T dap given, pain control given, appropriate labs and imaging done.  Duplex shows Soleal clot. Will follow up work up and reassess.

## 2019-08-26 NOTE — ED PROVIDER NOTE - CLINICAL SUMMARY MEDICAL DECISION MAKING FREE TEXT BOX
Patient found to have a distal DVT.  D-dimer sent and elevated and patient had PE study.  ED course complicated as patient was unable to urinate and was found on bedside US to be in urinary retention.  Despite resolution of retention patient complained of abdominal pain and vomiting. Coude catheter placed as patient was a difficult [place]. Patient found to have a foot fracture. Patient found to have a UTI and possible aspiration pneumonia on CT scan. Patient will require rehab evaluation and likely placement.  He is not a candidate for OBS.  Will admit to Tele for syncope and encounter for placement/rehab, UTI.

## 2019-08-26 NOTE — H&P ADULT - NSHPSOCIALHISTORY_GEN_ALL_CORE
Prior smoker, quit 50 years ago with 1pk/day for 10 years. Using CBD oil for 'chronic pain' in neck. Somewhat poor historian for his medications and care. Drinks 1 wine or vodka-tonic a day, denies binge drinking. No other drug use. Lives alone, has x2 daughters in [place] & [place]. Walks independently at home but has cane/walker if he needs.

## 2019-08-26 NOTE — ED PROCEDURE NOTE - CPROC ED INDICATIONS1
Specify the Indication for Ultrasonography/Inability to urinate, lower abdominal pain
bladder distention
fractured extremity

## 2019-08-26 NOTE — H&P ADULT - HISTORY OF PRESENT ILLNESS
79y/o M with PMH of HIV on HAART (last CD4 400s), hx of depression, hx of cervical disc disease s/p laminectomy (2018) presents with a syncopal episode. Occurred night before admit - pt was going to get a sandwich and then suddenly blacked out at home. The fall was unwitnessed and he had loss of conciousness. When he woke up, he was bleeding from the posterior scalp and had pain in the R lower leg on ambulation. He was able to get up but did not come to hospital due to transport issues. The morning of admit he then developed lightheadedness and nausea without vomiting, so came to ED. Denied having fever, chills, SOB, CP, palpitations, abd pain, incontinence of bowel or bladder, numbness/tingling, focal weakness.     In ED: patient was a trauma code. T99, HR55, /71, RR16, SpO2 98% on ambient air. Pan-CT scans showed prior known cervical disc disease, R lower lobe 'hazy' opacities, with fx of 5th metatarsal of R 5th toe. No PE was found. D-dimer was 2200s, BNP 500s, UA grossly positive with hematuria, lactate elevate at 3.8. Labs remarkable for Cr 1.5 (baseline ~1-1.2) but otherwise WNL. Orthostatics were positive in ED, 160/72 lying to 133/65 standing. 2L IVF given and pt started on ceftriaxone. Patient admitted for further workup. 79y/o M with PMH of HIV on HAART (last CD4 400s), ?Parkinsons, hx of depression, hx of cervical disc disease s/p laminectomy (2018) presents with a syncopal episode. Occurred night before admit - pt was going to get a sandwich and then suddenly blacked out at home. Pt lives alone, he had loss of conciousness for unknown period. Denies any preceding CP, palpitations, flushing, dizziness, lightheadedness. When he woke up he didn't check the time and had difficulty standing up due to pain in R leg which was flexed at knee underneath him and back of head. He was bleeding from the posterior scalp but denied being confused, no incontinence or tongue biting. He was able to get up eventually but did not come to hospital due to transport issues. The morning of admit he had residual lightheadedness and some nausea without vomiting, so came to ED. Denied having fever, chills, SOB, CP, palpitations, abd pain, incontinence of bowel or bladder, numbness/tingling, focal weakness. Endorses for 2mo having inc difficulty urinating and some burning when he does. Baseline constipation.    In ED: patient was a trauma code. T99, HR55, /71, RR16, SpO2 98% on ambient air. Pan-CT scans showed prior known cervical disc disease, R lower lobe 'hazy' opacities, with fx of 5th metatarsal of R 5th toe. No PE was found. D-dimer was 2200s, BNP 500s, UA grossly positive with hematuria, lactate elevate at 3.8. Labs remarkable for Cr 1.5 (baseline ~1-1.2) but otherwise WNL. Orthostatics were positive in ED, 160/72 lying to 133/65 standing. 2L IVF given and pt started on ceftriaxone. Patient admitted for further workup. 77y/o M with PMH of HIV on HAART (last CD4 400s), ?Parkinsons, hx of depression, hx of cervical disc disease s/p laminectomy (2018) presents with a syncopal episode. Occurred night before admit - pt was going to get a sandwich and then suddenly blacked out at home. Pt lives alone, he had loss of conciousness for unknown period. Denies any preceding CP, palpitations, flushing, dizziness, lightheadedness. When he woke up he didn't check the time and had difficulty standing up due to pain in R leg which was flexed at knee underneath him and back of head. He was bleeding from the posterior scalp but denied being confused, no incontinence or tongue biting. He was able to get up eventually but did not come to hospital due to transport issues. The morning of admit he had residual lightheadedness and some nausea without vomiting, so came to ED. Denied having fever, chills, SOB, CP, palpitations, abd pain, incontinence of bowel or bladder, numbness/tingling, focal weakness. Endorses for 2mo having inc difficulty urinating and some burning when he does. Baseline constipation.    In ED: patient was a trauma code. T99, HR55, /71, RR16, SpO2 98% on ambient air. Pan-CT scans showed prior known cervical disc disease, R lower lobe 'hazy' opacities, with fx of 5th metatarsal of R 5th toe. No PE was found. D-dimer was 2200s, BNP 500s, UA grossly positive with hematuria, lactate elevate at 3.8. Labs remarkable for Cr 1.5 (baseline ~1-1.2) but otherwise WNL. Orthostatics were positive in ED, 160/72 lying to 133/65 standing. 2L IVF given and pt started on ceftriaxone. Patient admitted for further workup.  Of note pt a poor historian for his meds - doesn't know doses/names. [facility]irmed [facility] but unclear if he is taking these appropriately.

## 2019-08-26 NOTE — H&P ADULT - NSICDXPASTSURGICALHX_GEN_ALL_CORE_FT
PAST SURGICAL HISTORY:  H/O total shoulder replacement, right     S/P laminectomy cervical spine, 2018 (Dr. 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

## 2019-08-26 NOTE — ED ADULT NURSE NOTE - OBJECTIVE STATEMENT
Pt presents to ED s/p fall last night. +LOC, pt states he was standing up then just fell, pt reports he hit his head. Pt presents to ed with right leg pain, nausea, vomiting. Pmhx of parkinsons, HIV. Pt states he is unable to ambulate due to pain in leg. Pt presents to ED s/p fall last night. +LOC, pt states he was standing up then just fell, pt reports he hit his head. Pt presents to ed with right leg pain, nausea, vomiting. Pmhx of parkinsons, HIV. Pt states he is unable to ambulate due to pain in leg. Pt has abrasion to back of head.

## 2019-08-26 NOTE — H&P ADULT - ASSESSMENT
79y/o with HIV on HAART presenting s/p syncope.     #Syncope, likely orthostatic, r/o underlying infection/arrhythmia  - positive orthostatics in ED  - trauma workup neg for acute pathology intracranial  - lactate 3.8 > 3.6 likely reactive from fall; trend 8pm and AM  - f/u duplex final read  - tylenol for pain, compazine for nausea   - +UA & possible aspiration PNA on CT   - c/w ceftriaxone (escalate if pt develops leukocytosis & fever  - consider Neuro eval if develops any neuro sxs  - tele monitoring    #R 5th metatarsal fx & head lac  - c/w splint on R foot  - s/p lac repair in ED    #YEN on CKD2  - Cr 1.5 on admit, baseline 1-1.2  - s/p 2L IVF in ED, c/w gentle hydration for 12hr  - monitor BMP in AM    #Cervical disc disease s/p laminectomy and fusions  - done by Dr. Goins in 2018  - C6-7 mod-severe spinal stenosis noted on scans  - consider NXs recall if pt develops sxs    #RLL lung nodule- 4mm nodule, f/u CT scan outpt 1yr    #MISC:   - DVT: lovenox  - GI: protonix  - Diet: DASH  - Activity: as tolerated, PT consult  - from home, full code 79y/o with HIV on HAART presenting s/p syncope.     #Syncope, likely orthostatic, r/o underlying infection/arrhythmia  - positive orthostatics in ED  - trauma workup neg for acute pathology intracranial  - lactate 3.8 > 3.6 likely reactive from fall; trend 8pm and AM  - f/u duplex final read  - tylenol for pain, compazine for nausea   - +UA & possible aspiration PNA on CT   - c/w ceftriaxone (escalate if pt develops leukocytosis & fever  - consider Neuro eval if develops any neuro sxs  - tele monitoring    #R 5th metatarsal fx & head lac  - c/w splint on R foot  - s/p lac repair in ED    #YEN on CKD2  - Cr 1.5 on admit, baseline 1-1.2  - s/p 2L IVF in ED, c/w gentle hydration for 12hr  - monitor BMP in AM    #HIV on HAART  - last CD4 ~400s  - c/w efavirenz/emtricitabine/tenofovir    #Hx of tremor, suspected Parkisonism component?  - c/w amantadine, outpt f/u    #Cervical disc disease s/p laminectomy and fusions  - done by Dr. Goins in 2018  - C6-7 mod-severe spinal stenosis noted on scans  - consider NXs recall if pt develops sxs    #RLL lung nodule- 4mm nodule, f/u CT scan outpt 1yr    #MISC:   - DVT: lovenox  - GI: protonix  - Diet: DASH  - Activity: as tolerated, PT consult  - from home, full code 79y/o with HIV on HAART presenting s/p syncope.     #Syncope, likely orthostatic, r/o underlying infection/arrhythmia  - positive orthostatics in ED  - trauma workup neg for acute pathology intracranial  - lactate 3.8 > 3.6 likely reactive from fall; trend 8pm and AM  - f/u duplex final read  - tylenol for pain, compazine for nausea   - +UA, symptomatic & possible aspiration PNA on CT   - f/u UCx  - c/w ceftriaxone (escalate if pt develops any leukocytosis & fever)  - PT consult  - tele monitoring    #Hx of Parkinson's  - c/w sinemet 3 tabs AM/noon, 2 tabs in PM (MDD: 8)  - PLEASE CLARIFY WITH EITHER NEURO/DR. ALVAREZ IN AM ABOUT HIS AMANTADINE (was on it prior but as per [facility] hasn't been filled since last yr)  - would use caution with amantadine given urinary retention   - [facility] says has active fluoxetine 40mg Rx but as per pt not using in 2wks (hold for now)  - consider inpt Neuro eval depending on pt ambulation & blood pressure   - orthostatics could be from worsening autonomic control    #YEN on CKD2, urinary retention   - Cr 1.5 on admit, baseline 1-1.2  - s/p 2L IVF in ED, c/w gentle hydration for 12hr  - c/w velasco for 24hr  - monitor BMP in AM    #HIV on HAART  - last CD4 ~400s  - c/w Trimequ     #R 5th metatarsal fx & head lac- c/w splint on R foot, pain control    #Cervical disc disease s/p laminectomy and fusions  - done by Dr. Goins in 2018  - C6-7 mod-severe spinal stenosis noted on scans  - consider NXs recall if pt develops sxs    #RLL lung nodule- 4mm nodule, f/u CT scan outpt 1yr    #MISC:   - DVT: lovenox  - GI: protonix  - Diet: DASH  - Activity: as tolerated, PT consult  - from home, full code

## 2019-08-26 NOTE — ED PROVIDER NOTE - CARE PLAN
Principal Discharge DX:	Encounter for rehabilitation  Secondary Diagnosis:	Fall  Secondary Diagnosis:	Metatarsal fracture  Secondary Diagnosis:	UTI (urinary tract infection)  Secondary Diagnosis:	Syncope Principal Discharge DX:	Encounter for rehabilitation  Secondary Diagnosis:	Fall  Secondary Diagnosis:	Metatarsal fracture  Secondary Diagnosis:	UTI (urinary tract infection)  Secondary Diagnosis:	Syncope  Secondary Diagnosis:	Acute urinary retention

## 2019-08-26 NOTE — H&P ADULT - NSHPPHYSICALEXAM_GEN_ALL_CORE
PHYSICAL EXAM:  GENERAL: NAD, well-developed  HEENT:  Atraumatic, Normocephalic; EOMI, PERRLA, conjunctiva pink, sclera white, Supple, No JVD, thyromegally or LYAD appreciated  CHEST/LUNG: Clear to auscultation bilaterally; No wheeze, ronchi or rales  HEART: Regular rate and rhythm; No murmurs, rubs, or gallops  ABDOMEN: Soft, Nontender, Nondistended; Bowel sounds present  EXTREMITIES:  2+ Peripheral Pulses, No peripheral pitting edema  PSYCH: AAOx3  NEUROLOGY: non-focal  SKIN: No rashes/lesions appreciated PHYSICAL EXAM:  GENERAL: NAD, elderly male breathing noisily through his mouth, eating dinner  HEENT:  Atraumatic, Normocephalic; EOMI, PERRLA, conjunctiva pink, sclera white, somewhat loss of neck ROM, moist oral mucosa, small head lac on back of head with some ecchmyosis over area, No lymph nodes appreciated  CHEST/LUNG: mild dec in R lung base breath sounds, No wheeze or crackles  HEART: Regular rate and rhythm; No murmurs appreciated  ABDOMEN: Soft, Nontender, mild distension, no suprapubic pain, Bowel sounds present; velasco draining somewhat cloudy yellow urine  EXTREMITIES:  No peripheral pitting edema; R knee not swollen/warm but pt has restriction to ~45' of flexion before pain limits, flexion at hip intact, no limit on ROM of L  PSYCH: AAOx3 but poor memory noted  NEUROLOGY: non-focal, grossly cranial nerves intact, pronounced tremor in b/l arms, some rigidity of arms/legs noted in general when flexing  SKIN: No rashes noted except for head bruise

## 2019-08-26 NOTE — ED PROCEDURE NOTE - PROCEDURE ADDITIONAL DETAILS
Bladder Volume 584, + Bladder Diverticulum
Coude catheter placed after [place] unsuccessful by nursing staff. No complications of procedure.

## 2019-08-26 NOTE — ED PROVIDER NOTE - PHYSICAL EXAMINATION
CONSTITUTIONAL: Well-developed; well-nourished; in no acute distress. gcs 15, speaking in full sentences, moving all extremities  SKIN: warm, dry  HEAD: Normocephalic; see above  EYES: PERRL, EOMI, no conjunctival erythema  ENT: No nasal discharge; airway clear, mucous membranes moist  NECK: supple; nontender  CARD: +S1, S2 no murmurs, gallops, or rubs. Regular rate and rhythm. radial 2+ b/l, no chest wall tenderness or crepitus  RESP: No wheezes, rales or rhonchi. CTABL  ABD: soft ntnd, no rebound, no guarding, no rigidity  EXT: moves all extremities,  No clubbing, cyanosis or edema.   NEURO: Alert, oriented, grossly unremarkable, cn ii-xii grossly intact, follows commands  PSYCH: Cooperative, appropriate. CONSTITUTIONAL: Well-developed; well-nourished; in no acute distress. gcs 15, speaking in full sentences, moving all extremities  SKIN: healed laceration to posterior scalp.   HEAD: Normocephalic; see above  EYES: PERRL, EOMI, no conjunctival erythema  ENT: No nasal discharge; airway clear, mucous membranes moist  NECK: supple; nontender  CARD: +S1, S2 no murmurs, gallops, or rubs. Regular rate and rhythm. radial 2+ b/l, no chest wall tenderness or crepitus  RESP: No wheezes, rales or rhonchi. CTABL  ABD: soft ntnd, no rebound, no guarding, no rigidity  EXT: moves all extremities,  No clubbing, cyanosis or edema.   NEURO: Alert, oriented, grossly unremarkable, cn ii-xii grossly intact, follows commands  PSYCH: Cooperative, appropriate.

## 2019-08-26 NOTE — ED PROVIDER NOTE - OBJECTIVE STATEMENT
78y M w/ PMH of HIV on HAART (last CD4: 156) presents with syncopal episode yesterday night. States was about to eat a sandwich when he suddenly blacked out. Unwitnessed fall. + LOC. Had bleeding from posterior head afterwards. Noticed increased pain to RLE when weight bearing. Was not able to get to the hospital due to transportation issues. This morning developed lightheadedness and nausea without vomiting. Denies fever, chills, CP, SOB, abd pain, urinary/bowel incontinence, or numbness/tingling. 78y M w/ PMH of HIV on HAART (last CD4: 400's) presents with syncopal episode yesterday night. States was about to eat a sandwich when he suddenly blacked out. Unwitnessed fall. + LOC. Had bleeding from posterior head afterwards. Noticed increased pain to RLE when weight bearing. Was not able to get to the hospital due to transportation issues. This morning developed lightheadedness and nausea without vomiting. Denies fever, chills, SOB, abd pain, urinary/bowel incontinence, or numbness/tingling.

## 2019-08-26 NOTE — H&P ADULT - NSHPLABSRESULTS_GEN_ALL_CORE
< from: CT Abdomen and Pelvis w/ IV Cont (08.26.19 @ 15:31) >    IMPRESSION:     No evidence of pulmonary embolism.    Hazy opacities within the posterior right lower lobe; correlate for   aspiration.    4 mm nodule within the right lower lobe. In a high-risk patient, low-dose   CT chest in 12 months is recommended as follow-up.    No evidence of intra-abdominal pathology.    < end of copied text >    < from: CT Cervical Spine No Cont (08.26.19 @ 09:35) >    IMPRESSION:    1.  Status post posterior decompression surgery with bilateral   laminectomies and transpedicular rods and screws at C3-C6 with   satisfactory positioning and no evidence of loosening or lucencies of the   hardware.    2.  Degenerative changes of the cervical spine C2-3 through C6-7, worse   at C6-7 as described above.    3.  No acute fractures or dislocations.     < end of copied text >  < from: Xray Toes, Right Foot (08.26.19 @ 10:31) >    Impression:    Acute fifth metatarsal neck nondisplaced fracture.

## 2019-08-26 NOTE — ED PROCEDURE NOTE - NS ED PROCEDURE ASSISTED BY
The procedure was performed independently
Supervision was available
The procedure was performed independently

## 2019-08-27 LAB
ANION GAP SERPL CALC-SCNC: 13 MMOL/L — SIGNIFICANT CHANGE UP (ref 7–14)
BASOPHILS # BLD AUTO: 0.03 K/UL — SIGNIFICANT CHANGE UP (ref 0–0.2)
BASOPHILS NFR BLD AUTO: 0.3 % — SIGNIFICANT CHANGE UP (ref 0–1)
BUN SERPL-MCNC: 19 MG/DL — SIGNIFICANT CHANGE UP (ref 10–20)
CALCIUM SERPL-MCNC: 8.5 MG/DL — SIGNIFICANT CHANGE UP (ref 8.5–10.1)
CHLORIDE SERPL-SCNC: 112 MMOL/L — HIGH (ref 98–110)
CO2 SERPL-SCNC: 17 MMOL/L — SIGNIFICANT CHANGE UP (ref 17–32)
CREAT SERPL-MCNC: 1.3 MG/DL — SIGNIFICANT CHANGE UP (ref 0.7–1.5)
EOSINOPHIL # BLD AUTO: 0.05 K/UL — SIGNIFICANT CHANGE UP (ref 0–0.7)
EOSINOPHIL NFR BLD AUTO: 0.5 % — SIGNIFICANT CHANGE UP (ref 0–8)
GLUCOSE SERPL-MCNC: 91 MG/DL — SIGNIFICANT CHANGE UP (ref 70–99)
HCT VFR BLD CALC: 37 % — LOW (ref 42–52)
HGB BLD-MCNC: 12.4 G/DL — LOW (ref 14–18)
IMM GRANULOCYTES NFR BLD AUTO: 0.4 % — HIGH (ref 0.1–0.3)
LACTATE SERPL-SCNC: 1.6 MMOL/L — SIGNIFICANT CHANGE UP (ref 0.5–2.2)
LYMPHOCYTES # BLD AUTO: 1.43 K/UL — SIGNIFICANT CHANGE UP (ref 1.2–3.4)
LYMPHOCYTES # BLD AUTO: 15.4 % — LOW (ref 20.5–51.1)
MAGNESIUM SERPL-MCNC: 1.9 MG/DL — SIGNIFICANT CHANGE UP (ref 1.8–2.4)
MCHC RBC-ENTMCNC: 31.9 PG — HIGH (ref 27–31)
MCHC RBC-ENTMCNC: 33.5 G/DL — SIGNIFICANT CHANGE UP (ref 32–37)
MCV RBC AUTO: 95.1 FL — HIGH (ref 80–94)
MONOCYTES # BLD AUTO: 0.71 K/UL — HIGH (ref 0.1–0.6)
MONOCYTES NFR BLD AUTO: 7.7 % — SIGNIFICANT CHANGE UP (ref 1.7–9.3)
NEUTROPHILS # BLD AUTO: 7.01 K/UL — HIGH (ref 1.4–6.5)
NEUTROPHILS NFR BLD AUTO: 75.7 % — HIGH (ref 42.2–75.2)
NRBC # BLD: 0 /100 WBCS — SIGNIFICANT CHANGE UP (ref 0–0)
PLATELET # BLD AUTO: 130 K/UL — SIGNIFICANT CHANGE UP (ref 130–400)
POTASSIUM SERPL-MCNC: 4.5 MMOL/L — SIGNIFICANT CHANGE UP (ref 3.5–5)
POTASSIUM SERPL-SCNC: 4.5 MMOL/L — SIGNIFICANT CHANGE UP (ref 3.5–5)
RBC # BLD: 3.89 M/UL — LOW (ref 4.7–6.1)
RBC # FLD: 14 % — SIGNIFICANT CHANGE UP (ref 11.5–14.5)
SODIUM SERPL-SCNC: 142 MMOL/L — SIGNIFICANT CHANGE UP (ref 135–146)
TROPONIN T SERPL-MCNC: 0.01 NG/ML — SIGNIFICANT CHANGE UP
TROPONIN T SERPL-MCNC: 0.02 NG/ML — HIGH
WBC # BLD: 9.27 K/UL — SIGNIFICANT CHANGE UP (ref 4.8–10.8)
WBC # FLD AUTO: 9.27 K/UL — SIGNIFICANT CHANGE UP (ref 4.8–10.8)

## 2019-08-27 PROCEDURE — 93306 TTE W/DOPPLER COMPLETE: CPT | Mod: 26

## 2019-08-27 PROCEDURE — 99222 1ST HOSP IP/OBS MODERATE 55: CPT

## 2019-08-27 RX ORDER — ONDANSETRON 8 MG/1
4 TABLET, FILM COATED ORAL THREE TIMES A DAY
Refills: 0 | Status: DISCONTINUED | OUTPATIENT
Start: 2019-08-27 | End: 2019-08-30

## 2019-08-27 RX ORDER — SODIUM CHLORIDE 9 MG/ML
500 INJECTION INTRAMUSCULAR; INTRAVENOUS; SUBCUTANEOUS ONCE
Refills: 0 | Status: COMPLETED | OUTPATIENT
Start: 2019-08-27 | End: 2019-08-27

## 2019-08-27 RX ORDER — SODIUM CHLORIDE 9 MG/ML
1000 INJECTION INTRAMUSCULAR; INTRAVENOUS; SUBCUTANEOUS
Refills: 0 | Status: DISCONTINUED | OUTPATIENT
Start: 2019-08-27 | End: 2019-08-30

## 2019-08-27 RX ADMIN — SODIUM CHLORIDE 9999 MILLILITER(S): 9 INJECTION INTRAMUSCULAR; INTRAVENOUS; SUBCUTANEOUS at 06:11

## 2019-08-27 RX ADMIN — Medication 12.5 MILLIGRAM(S): at 05:12

## 2019-08-27 RX ADMIN — CARBIDOPA AND LEVODOPA 3 TABLET(S): 25; 100 TABLET ORAL at 05:12

## 2019-08-27 RX ADMIN — ONDANSETRON 4 MILLIGRAM(S): 8 TABLET, FILM COATED ORAL at 07:23

## 2019-08-27 RX ADMIN — ENOXAPARIN SODIUM 40 MILLIGRAM(S): 100 INJECTION SUBCUTANEOUS at 11:55

## 2019-08-27 RX ADMIN — CEFTRIAXONE 100 MILLIGRAM(S): 500 INJECTION, POWDER, FOR SOLUTION INTRAMUSCULAR; INTRAVENOUS at 16:08

## 2019-08-27 RX ADMIN — LACTULOSE 20 GRAM(S): 10 SOLUTION ORAL at 02:46

## 2019-08-27 RX ADMIN — SODIUM CHLORIDE 75 MILLILITER(S): 9 INJECTION INTRAMUSCULAR; INTRAVENOUS; SUBCUTANEOUS at 09:32

## 2019-08-27 RX ADMIN — SODIUM CHLORIDE 75 MILLILITER(S): 9 INJECTION INTRAMUSCULAR; INTRAVENOUS; SUBCUTANEOUS at 19:05

## 2019-08-27 RX ADMIN — SODIUM CHLORIDE 500 MILLILITER(S): 9 INJECTION INTRAMUSCULAR; INTRAVENOUS; SUBCUTANEOUS at 06:49

## 2019-08-27 RX ADMIN — CARBIDOPA AND LEVODOPA 3 TABLET(S): 25; 100 TABLET ORAL at 16:04

## 2019-08-27 RX ADMIN — Medication 12.5 MILLIGRAM(S): at 16:11

## 2019-08-27 NOTE — PROGRESS NOTE ADULT - SUBJECTIVE AND OBJECTIVE BOX
TOMY ESPINOSA  78y, Male  Allergy: No Known Allergies      CHIEF COMPLAINT: syncope (26 Aug 2019 17:40)      INTERVAL EVENTS/HPI  - No acute events overnight  - T(F): , Max: 99.2 (08-27-19 @ 00:13)  - Denies any worsening symptoms  - Tolerating medication  - WBC Count: 9.27 K/uL (08-27-19 @ 06:16)      ROS  General: Denies rigors, nightsweats  HEENT: Denies headache, rhinorrhea, sore throat, eye pain  CV: Denies CP, palpitations  PULM: Denies SOB, wheezing  GI: Denies hematemesis, hematochezia, melena  : Denies discharge, hematuria  MSK: Denies arthralgias, myalgias  SKIN: Denies rash, lesions  NEURO: Denies paresthesias, weakness  PSYCH: Denies depression, anxiety    VITALS:  T(F): 99.2, Max: 99.2 (08-27-19 @ 00:13)  HR: 54  BP: 92/58  RR: 18Vital Signs Last 24 Hrs  T(C): 37.3 (27 Aug 2019 00:13), Max: 37.3 (27 Aug 2019 00:13)  T(F): 99.2 (27 Aug 2019 00:13), Max: 99.2 (27 Aug 2019 00:13)  HR: 54 (27 Aug 2019 06:53) (50 - 72)  BP: 92/58 (27 Aug 2019 06:53) (85/49 - 196/74)  BP(mean): --  RR: 18 (27 Aug 2019 00:13) (16 - 24)  SpO2: 96% (27 Aug 2019 00:13) (96% - 100%)    PHYSICAL EXAM:  Gen: NAD, resting in bed  HEENT: Normocephalic, atraumatic  Neck: supple, no lymphadenopathy  CV: Regular rate & regular rhythm  Lungs: decreased BS at bases, no fremitus  Abdomen: Soft, BS present  Ext: Warm, well perfused  Neuro: non focal, awake  Skin: no rash, no erythema  Lines: no phlebitis    FH: Non-contributory  Social Hx: Non-contributory    TESTS & MEASUREMENTS:                        12.4   9.27  )-----------( 130      ( 27 Aug 2019 06:16 )             37.0     08-27    142  |  112<H>  |  19  ----------------------------<  91  4.5   |  17  |  1.3    Ca    8.5      27 Aug 2019 06:16  Mg     1.9     08-27    TPro  7.3  /  Alb  4.5  /  TBili  0.9  /  DBili  x   /  AST  16  /  ALT  11  /  AlkPhos  64  08-26    eGFR if Non African American: 53 mL/min/1.73M2 (08-27-19 @ 06:16)  eGFR if : 61 mL/min/1.73M2 (08-27-19 @ 06:16)  eGFR if Non African American: 45 mL/min/1.73M2 (08-26-19 @ 08:25)  eGFR if : 52 mL/min/1.73M2 (08-26-19 @ 08:25)    LIVER FUNCTIONS - ( 26 Aug 2019 08:25 )  Alb: 4.5 g/dL / Pro: 7.3 g/dL / ALK PHOS: 64 U/L / ALT: 11 U/L / AST: 16 U/L / GGT: x           Urinalysis Basic - ( 26 Aug 2019 15:51 )    Color: Yellow / Appearance: Turbid / SG: >=1.030 / pH: x  Gluc: x / Ketone: Negative  / Bili: Negative / Urobili: 1.0   Blood: x / Protein: 30 / Nitrite: Positive   Leuk Esterase: Large / RBC: 10-20 /HPF / WBC >50 /HPF   Sq Epi: x / Non Sq Epi: occ /HPF / Bacteria: Many          Lactate, Blood: 1.6 mmol/L (08-27-19 @ 06:16)  Lactate, Blood: 1.3 mmol/L (08-26-19 @ 20:35)  Blood Gas Venous - Lactate: 3.6 mmoL/L (08-26-19 @ 09:07)  Lactate, Blood: 3.8 mmol/L (08-26-19 @ 08:25)      INFECTIOUS DISEASES TESTING      RADIOLOGY & ADDITIONAL TESTS:  I have personally reviewed the last Chest xray  CXR  Xray Chest 1 View AP/PA:   EXAM:  XR CHEST FRONTAL 1V            PROCEDURE DATE:  08/26/2019            INTERPRETATION:  Clinical History / Reason for exam: Post fall    Comparison : Chest radiograph May 22, 2018.    Technique/Positioning: AP.    Findings:    Support devices: None.    Cardiac/mediastinum/hilum: Unremarkable.    Lung parenchyma/Pleura: Within normal limits.    Skeleton/soft tissues: Stable.    Impression:      No radiographic evidence of acute cardiopulmonary disease.                      SUSAN NICHOLS M.D., ATTENDING RADIOLOGIST  This document has been electronically signed. Aug 26 2019 11:41AM             (08-26-19 @ 10:29)      CT  CT Abdomen and Pelvis w/ IV Cont:   EXAM:  CT ABDOMEN AND PELVIS IC        EXAM:  CT ANGIO CHEST (W)AW IC            PROCEDURE DATE:  08/26/2019            INTERPRETATION:  CLINICAL STATEMENT: Shortness of breath. Clinical   diagnosis of pulmonary embolus.    TECHNIQUE: Contiguous axial CTA images were obtained from the thoracic   inlet to the upper abdomen following administration of 100 cc Optiray 350   intravenous contrast. Contiguous axial venous phase images were then   obtained through the abdomen and pelvis.  Reformatted images in the   coronal and sagittal planes were acquired. Reformatted axial 3-D MIP   images through the chest were acquired.    COMPARISON CT: CT abdomen and pelvis dated 5/20/2016      FINDINGS:    CHEST:    PULMONARY ARTERIES: There are no pulmonary emboli.    AIRWAYS/LUNGS/PLEURA: Small tracheal diverticulum. There are opacities in   the trachea and central bronchi compatible with secretions.  Emphysema is   present.  There is no pleural effusion. There is no pneumothorax. 4 mm   nodule withinthe right lower lobe, series 4 image 58. There are hazy   opacities within the posterior right lower lobe. Bibasilar subsegmental   atelectasis.    MEDIASTINUM: There are no enlarged mediastinal, hilar or axillary lymph   nodes. The visualized portion of the thyroid gland is unremarkable.    There is a small hiatal hernia.    HEART AND VESSELS: The heart is normal in size. There is no pericardial   effusion.       ABDOMEN/PELVIS:    HEPATOBILIARY: There are subcentimeter hypodensities within liver, too   small to further characterize.     SPLEEN: Unremarkable.    PANCREAS: There is mild pancreatic ductal dilatation, measuring up to 3   mm.    ADRENAL GLANDS: There is bilateral adrenal gland thickening.    KIDNEYS: There are bilateral renal cysts and subcentimeter hypodensities,   too small to further characterize. No evidence of hydronephrosis.    ABDOMINOPELVIC NODES: Unremarkable.    PELVIC ORGANS: There is a Alicea catheter with tip visualized within the   collapsed urinary bladder. There is air within the urinary bladder,   likely from instrumentation.    PERITONEUM/MESENTERY/BOWEL: There are gastric sutures present. There is a   small hiatal hernia. No evidence for bowel obstruction, ascites, or   pneumoperitoneum.     BONES/SOFT TISSUES: There are diffuse degenerative changes. No evidence   of compression fracture. There is right humeral orthopedic hardware.   Partially imaged cervical spine hardware.    IMPRESSION:     No evidence of pulmonary embolism.    Hazy opacities within the posterior right lower lobe; correlate for   aspiration.    4 mm nodule within the right lower lobe. In a high-risk patient, low-dose   CT chest in 12 months is recommended as follow-up.    No evidence of intra-abdominal pathology.                        JEREMY TUCKER M.D., RESIDENT RADIOLOGIST  This document has been electronically signed.  MARIA DRISCOLL M.D., ATTENDING RADIOLOGIST  This document has been electronically signed. Aug 26 2019  4:22PM             (08-26-19 @ 15:31)  CT Angio Chest w/ IV Cont:   EXAM:  CT ABDOMEN AND PELVIS IC        EXAM:  CT ANGIO CHEST (W)AW IC            PROCEDURE DATE:  08/26/2019            INTERPRETATION:  CLINICAL STATEMENT: Shortness of breath. Clinical   diagnosis of pulmonary embolus.    TECHNIQUE: Contiguous axial CTA images were obtained from the thoracic   inlet to the upper abdomen following administration of 100 cc Optiray 350   intravenous contrast. Contiguous axial venous phase images were then   obtained through the abdomen and pelvis.  Reformatted images in the   coronal and sagittal planes were acquired. Reformatted axial 3-D MIP   images through the chest were acquired.    COMPARISON CT: CT abdomen and pelvis dated 5/20/2016      FINDINGS:    CHEST:    PULMONARY ARTERIES: There are no pulmonary emboli.    AIRWAYS/LUNGS/PLEURA: Small tracheal diverticulum. There are opacities in   the trachea and central bronchi compatible with secretions.  Emphysema is   present.  There is no pleural effusion. There is no pneumothorax. 4 mm   nodule withinthe right lower lobe, series 4 image 58. There are hazy   opacities within the posterior right lower lobe. Bibasilar subsegmental   atelectasis.    MEDIASTINUM: There are no enlarged mediastinal, hilar or axillary lymph   nodes. The visualized portion of the thyroid gland is unremarkable.    There is a small hiatal hernia.    HEART AND VESSELS: The heart is normal in size. There is no pericardial   effusion.       ABDOMEN/PELVIS:    HEPATOBILIARY: There are subcentimeter hypodensities within liver, too   small to further characterize.     SPLEEN: Unremarkable.    PANCREAS: There is mild pancreatic ductal dilatation, measuring up to 3   mm.    ADRENAL GLANDS: There is bilateral adrenal gland thickening.    KIDNEYS: There are bilateral renal cysts and subcentimeter hypodensities,   too small to further characterize. No evidence of hydronephrosis.    ABDOMINOPELVIC NODES: Unremarkable.    PELVIC ORGANS: There is a Alicea catheter with tip visualized within the   collapsed urinary bladder. There is air within the urinary bladder,   likely from instrumentation.    PERITONEUM/MESENTERY/BOWEL: There are gastric sutures present. There is a   small hiatal hernia. No evidence for bowel obstruction, ascites, or   pneumoperitoneum.     BONES/SOFT TISSUES: There are diffuse degenerative changes. No evidence   of compression fracture. There is right humeral orthopedic hardware.   Partially imaged cervical spine hardware.    IMPRESSION:     No evidence of pulmonary embolism.    Hazy opacities within the posterior right lower lobe; correlate for   aspiration.    4 mm nodule within the right lower lobe. In a high-risk patient, low-dose   CT chest in 12 months is recommended as follow-up.    No evidence of intra-abdominal pathology.                        JEREMY TUCKER M.D., RESIDENT RADIOLOGIST  This document has been electronically signed.  MARIA DRISCOLL M.D., ATTENDING RADIOLOGIST  This document has been electronically signed. Aug 26 2019  4:22PM             (08-26-19 @ 15:17)      CARDIOLOGY TESTING  12 Lead ECG:   Ventricular Rate 69 BPM    Atrial Rate 69 BPM    P-R Interval 178 ms    QRS Duration 92 ms    Q-T Interval 436 ms    QTC Calculation(Bezet) 467 ms    P Axis 73 degrees    R Axis -70 degrees    T Axis 75 degrees    Diagnosis Line Normal sinus rhythm  Left axis deviation  Abnormal ECG    Confirmed by Michelle Zamora MD ([id number]) on 8/26/2019 2:53:57 PM (08-26-19 @ 14:27)  12 Lead ECG:   Ventricular Rate 57 BPM    Atrial Rate 57 BPM    P-R Interval 180 ms    QRS Duration 86 ms    Q-T Interval 442 ms    QTC Calculation(Bezet) 430 ms    P Axis 63 degrees    R Axis -66 degrees    T Axis 64 degrees    Diagnosis Line Sinus bradycardia  Left axis deviation  Left anterior fascicular block  Abnormal ECG    Confirmed by ALEE SINGH MD [id number]) on 8/26/2019 12:28:08 PM (08-26-19 @ 09:58)      MEDICATIONS  abacavir 600 mG/dolutegravir 50 mG/lamiVUDine 300 mG 1  carbidopa/levodopa  25/100 3  carbidopa/levodopa  25/100 2  cefTRIAXone   IVPB 1000  chlorhexidine 4% Liquid 1  docusate sodium 100  enoxaparin Injectable 40  pantoprazole    Tablet 40  senna 2  sodium chloride 0.9%. 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 Pt reports he has had 4 episodes in the past year. He reports feeling dizzy and nauseous prior to the event and then fell from a standing position with LOC. Reports pain in his R hamstring and R 5th toe. Also reports dysuria with urinary hesitancy. No fever or chills. +emesis but reports this is a common problem for him. He lives alone.    PAST MEDICAL & SURGICAL HISTORY:  Depression  HIV disease  S/P laminectomy: cervical spine, 2018 (Dr. 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        ROS  General: Denies rigors, nightsweats  HEENT: Denies headache, rhinorrhea, sore throat, eye pain  CV: Denies CP, palpitations  PULM: Denies SOB, wheezing  GI: Denies hematemesis, hematochezia, melena  : as noted above   MSK: as noted above   SKIN: Denies rash, lesions  NEURO: Denies paresthesias, weakness  PSYCH: Denies depression, anxiety    VITALS:  T(F): 99.2, Max: 99.2 (08-27-19 @ 00:13)  HR: 54  BP: 92/58  RR: 18Vital Signs Last 24 Hrs  T(C): 37.3 (27 Aug 2019 00:13), Max: 37.3 (27 Aug 2019 00:13)  T(F): 99.2 (27 Aug 2019 00:13), Max: 99.2 (27 Aug 2019 00:13)  HR: 54 (27 Aug 2019 06:53) (50 - 72)  BP: 92/58 (27 Aug 2019 06:53) (85/49 - 196/74)  BP(mean): --  RR: 18 (27 Aug 2019 00:13) (16 - 24)  SpO2: 96% (27 Aug 2019 00:13) (96% - 100%)    PHYSICAL EXAM:  Gen: thin M NAD, resting in bed  HEENT: Normocephalic, atraumatic  Neck: supple, no lymphadenopathy  CV: Regular rate & regular rhythm  Lungs: decreased BS at bases, no fremitus  Abdomen: Soft, BS present no suprapubic tenderness, no CVAT   Ext: Warm, well perfused  Neuro: non focal, awake  Skin: no rash, no erythema  Lines: no phlebitis    FH: Non-contributory  Social Hx: Non-contributory    TESTS & MEASUREMENTS:                        12.4   9.27  )-----------( 130      ( 27 Aug 2019 06:16 )             37.0     08-27    142  |  112<H>  |  19  ----------------------------<  91  4.5   |  17  |  1.3    Ca    8.5      27 Aug 2019 06:16  Mg     1.9     08-27    TPro  7.3  /  Alb  4.5  /  TBili  0.9  /  DBili  x   /  AST  16  /  ALT  11  /  AlkPhos  64  08-26    eGFR if Non African American: 53 mL/min/1.73M2 (08-27-19 @ 06:16)  eGFR if : 61 mL/min/1.73M2 (08-27-19 @ 06:16)  eGFR if Non African American: 45 mL/min/1.73M2 (08-26-19 @ 08:25)  eGFR if : 52 mL/min/1.73M2 (08-26-19 @ 08:25)    LIVER FUNCTIONS - ( 26 Aug 2019 08:25 )  Alb: 4.5 g/dL / Pro: 7.3 g/dL / ALK PHOS: 64 U/L / ALT: 11 U/L / AST: 16 U/L / GGT: x           Urinalysis Basic - ( 26 Aug 2019 15:51 )    Color: Yellow / Appearance: Turbid / SG: >=1.030 / pH: x  Gluc: x / Ketone: Negative  / Bili: Negative / Urobili: 1.0   Blood: x / Protein: 30 / Nitrite: Positive   Leuk Esterase: Large / RBC: 10-20 /HPF / WBC >50 /HPF   Sq Epi: x / Non Sq Epi: occ /HPF / Bacteria: Many          Lactate, Blood: 1.6 mmol/L (08-27-19 @ 06:16)  Lactate, Blood: 1.3 mmol/L (08-26-19 @ 20:35)  Blood Gas Venous - Lactate: 3.6 mmoL/L (08-26-19 @ 09:07)  Lactate, Blood: 3.8 mmol/L (08-26-19 @ 08:25)      INFECTIOUS DISEASES TESTING      RADIOLOGY & ADDITIONAL TESTS:  I have personally reviewed the last Chest xray  CXR  Xray Chest 1 View AP/PA:   EXAM:  XR CHEST FRONTAL 1V            PROCEDURE DATE:  08/26/2019            INTERPRETATION:  Clinical History / Reason for exam: Post fall    Comparison : Chest radiograph May 22, 2018.    Technique/Positioning: AP.    Findings:    Support devices: None.    Cardiac/mediastinum/hilum: Unremarkable.    Lung parenchyma/Pleura: Within normal limits.    Skeleton/soft tissues: Stable.    Impression:      No radiographic evidence of acute cardiopulmonary disease.                      SUSAN NICHOLS M.D., ATTENDING RADIOLOGIST  This document has been electronically signed. Aug 26 2019 11:41AM             (08-26-19 @ 10:29)      CT  CT Abdomen and Pelvis w/ IV Cont:   EXAM:  CT ABDOMEN AND PELVIS IC        EXAM:  CT ANGIO CHEST (W)AW IC            PROCEDURE DATE:  08/26/2019            INTERPRETATION:  CLINICAL STATEMENT: Shortness of breath. Clinical   diagnosis of pulmonary embolus.    TECHNIQUE: Contiguous axial CTA images were obtained from the thoracic   inlet to the upper abdomen following administration of 100 cc Optiray 350   intravenous contrast. Contiguous axial venous phase images were then   obtained through the abdomen and pelvis.  Reformatted images in the   coronal and sagittal planes were acquired. Reformatted axial 3-D MIP   images through the chest were acquired.    COMPARISON CT: CT abdomen and pelvis dated 5/20/2016      FINDINGS:    CHEST:    PULMONARY ARTERIES: There are no pulmonary emboli.    AIRWAYS/LUNGS/PLEURA: Small tracheal diverticulum. There are opacities in   the trachea and central bronchi compatible with secretions.  Emphysema is   present.  There is no pleural effusion. There is no pneumothorax. 4 mm   nodule withinthe right lower lobe, series 4 image 58. There are hazy   opacities within the posterior right lower lobe. Bibasilar subsegmental   atelectasis.    MEDIASTINUM: There are no enlarged mediastinal, hilar or axillary lymph   nodes. The visualized portion of the thyroid gland is unremarkable.    There is a small hiatal hernia.    HEART AND VESSELS: The heart is normal in size. There is no pericardial   effusion.       ABDOMEN/PELVIS:    HEPATOBILIARY: There are subcentimeter hypodensities within liver, too   small to further characterize.     SPLEEN: Unremarkable.    PANCREAS: There is mild pancreatic ductal dilatation, measuring up to 3   mm.    ADRENAL GLANDS: There is bilateral adrenal gland thickening.    KIDNEYS: There are bilateral renal cysts and subcentimeter hypodensities,   too small to further characterize. No evidence of hydronephrosis.    ABDOMINOPELVIC NODES: Unremarkable.    PELVIC ORGANS: There is a Alicea catheter with tip visualized within the   collapsed urinary bladder. There is air within the urinary bladder,   likely from instrumentation.    PERITONEUM/MESENTERY/BOWEL: There are gastric sutures present. There is a   small hiatal hernia. No evidence for bowel obstruction, ascites, or   pneumoperitoneum.     BONES/SOFT TISSUES: There are diffuse degenerative changes. No evidence   of compression fracture. There is right humeral orthopedic hardware.   Partially imaged cervical spine hardware.    IMPRESSION:     No evidence of pulmonary embolism.    Hazy opacities within the posterior right lower lobe; correlate for   aspiration.    4 mm nodule within the right lower lobe. In a high-risk patient, low-dose   CT chest in 12 months is recommended as follow-up.    No evidence of intra-abdominal pathology.                        JEREMY TUCKER M.D., RESIDENT RADIOLOGIST  This document has been electronically signed.  MARIA DRISCOLL M.D., ATTENDING RADIOLOGIST  This document has been electronically signed. Aug 26 2019  4:22PM             (08-26-19 @ 15:31)  CT Angio Chest w/ IV Cont:   EXAM:  CT ABDOMEN AND PELVIS IC        EXAM:  CT ANGIO CHEST (W)AW IC            PROCEDURE DATE:  08/26/2019            INTERPRETATION:  CLINICAL STATEMENT: Shortness of breath. Clinical   diagnosis of pulmonary embolus.    TECHNIQUE: Contiguous axial CTA images were obtained from the thoracic   inlet to the upper abdomen following administration of 100 cc Optiray 350   intravenous contrast. Contiguous axial venous phase images were then   obtained through the abdomen and pelvis.  Reformatted images in the   coronal and sagittal planes were acquired. Reformatted axial 3-D MIP   images through the chest were acquired.    COMPARISON CT: CT abdomen and pelvis dated 5/20/2016      FINDINGS:    CHEST:    PULMONARY ARTERIES: There are no pulmonary emboli.    AIRWAYS/LUNGS/PLEURA: Small tracheal diverticulum. There are opacities in   the trachea and central bronchi compatible with secretions.  Emphysema is   present.  There is no pleural effusion. There is no pneumothorax. 4 mm   nodule withinthe right lower lobe, series 4 image 58. There are hazy   opacities within the posterior right lower lobe. Bibasilar subsegmental   atelectasis.    MEDIASTINUM: There are no enlarged mediastinal, hilar or axillary lymph   nodes. The visualized portion of the thyroid gland is unremarkable.    There is a small hiatal hernia.    HEART AND VESSELS: The heart is normal in size. There is no pericardial   effusion.       ABDOMEN/PELVIS:    HEPATOBILIARY: There are subcentimeter hypodensities within liver, too   small to further characterize.     SPLEEN: Unremarkable.    PANCREAS: There is mild pancreatic ductal dilatation, measuring up to 3   mm.    ADRENAL GLANDS: There is bilateral adrenal gland thickening.    KIDNEYS: There are bilateral renal cysts and subcentimeter hypodensities,   too small to further characterize. No evidence of hydronephrosis.    ABDOMINOPELVIC NODES: Unremarkable.    PELVIC ORGANS: There is a Alicea catheter with tip visualized within the   collapsed urinary bladder. There is air within the urinary bladder,   likely from instrumentation.    PERITONEUM/MESENTERY/BOWEL: There are gastric sutures present. There is a   small hiatal hernia. No evidence for bowel obstruction, ascites, or   pneumoperitoneum.     BONES/SOFT TISSUES: There are diffuse degenerative changes. No evidence   of compression fracture. There is right humeral orthopedic hardware.   Partially imaged cervical spine hardware.    IMPRESSION:     No evidence of pulmonary embolism.    Hazy opacities within the posterior right lower lobe; correlate for   aspiration.    4 mm nodule within the right lower lobe. In a high-risk patient, low-dose   CT chest in 12 months is recommended as follow-up.    No evidence of intra-abdominal pathology.                        JEREMY TUCKER M.D., RESIDENT RADIOLOGIST  This document has been electronically signed.  MARIA DRISCOLL M.D., ATTENDING RADIOLOGIST  This document has been electronically signed. Aug 26 2019  4:22PM             (08-26-19 @ 15:17)      CARDIOLOGY TESTING  12 Lead ECG:   Ventricular Rate 69 BPM    Atrial Rate 69 BPM    P-R Interval 178 ms    QRS Duration 92 ms    Q-T Interval 436 ms    QTC Calculation(Bezet) 467 ms    P Axis 73 degrees    R Axis -70 degrees    T Axis 75 degrees    Diagnosis Line Normal sinus rhythm  Left axis deviation  Abnormal ECG    Confirmed by Michelle Zamora MD ([id number]) on 8/26/2019 2:53:57 PM (08-26-19 @ 14:27)  12 Lead ECG:   Ventricular Rate 57 BPM    Atrial Rate 57 BPM    P-R Interval 180 ms    QRS Duration 86 ms    Q-T Interval 442 ms    QTC Calculation(Bezet) 430 ms    P Axis 63 degrees    R Axis -66 degrees    T Axis 64 degrees    Diagnosis Line Sinus bradycardia  Left axis deviation  Left anterior fascicular block  Abnormal ECG    Confirmed by ALEE SINGH MD [id number]) on 8/26/2019 12:28:08 PM (08-26-19 @ 09:58)      MEDICATIONS  abacavir 600 mG/dolutegravir 50 mG/lamiVUDine 300 mG 1  carbidopa/levodopa  25/100 3  carbidopa/levodopa  25/100 2  cefTRIAXone   IVPB 1000  chlorhexidine 4% Liquid 1  docusate sodium 100  enoxaparin Injectable 40  pantoprazole    Tablet 40  senna 2  sodium chloride 0.9%. 1000      ANTIBIOTICS:  abacavir 600 mG/dolutegravir 50 mG/lamiVUDine 300 mG 1 Tablet(s) Oral daily  cefTRIAXone   IVPB 1000 milliGRAM(s) IV Intermittent every 24 hours      All available historical records have been reviewed

## 2019-08-27 NOTE — PROGRESS NOTE ADULT - ASSESSMENT
78F with HIV on HAART presenting s/p syncope    # Syncope, likely orthostatic, r/o underlying infection/arrhythmia in combination with HIV status and Parkinsons leading to autonomic dysfxn  - positive orthostatics in ED, rpt orthostatics   - trauma workup neg  - lactate 3.8 -->1.6  - R soleal vein thrombosis  - Tylenol for pain, compazine for nausea   - +UA, repeat UA  - CT Chest revealing hazy opacities w/in post right low lobe, poss aspiration,4mm nodule within R lower lobe  - f/u UCx   - c/w ceftriaxone   - PT consult  - c/w tele monitoring    # Hx of Parkinson's  - c/w sinemet 3 tabs AM/noon, 2 tabs in PM (MDD: 8)- pt states he has been taking a diff dose OP, he has also been taking a prescription from [facility]. Overdosing on carbidipa?  - fluoxetine 40mg Rx but as per pt not using in 2wks (hold for now)  - f/u neuro recs    #YEN on CKD2, urinary retention  - Cr 1.5 on admit, baseline 1.2-1.4, resolved  - s/p 2L IVF in ED, s/p IVF  - c/w velasco for 24hr, d/c vleasco, ToV  - monitor BMP in AM    # Asymptomatic HIV (no hx CD4 <200)  - abacavir 600 mG/dolutegravir 50 mG/lamiVUDine 300 mG 1 Tablet(s) Oral daily  - Follows with Dr. Wolf    # Small tracheal diverticulum. There are opacities in the trachea and central bronchi compatible with secretions.  Emphysema is   present  - Speech/Swallow eval    #R 5th metatarsal fx & head lac- c/w splint on R foot, pain control    #Cervical disc disease s/p laminectomy and fusions  - done by Dr. Goins in 2018  - C6-7 mod-severe spinal stenosis noted on scans  - consider NXs recall if pt develops sxs    #RLL lung nodule- 4mm nodule, f/u CT scan outpt 1yr    #MISC:   - DVT: lovenox  - GI: protonix  - Diet: DASH  - Activity: as tolerated, PT consult  - from home, full code

## 2019-08-27 NOTE — PROGRESS NOTE ADULT - SUBJECTIVE AND OBJECTIVE BOX
The pt was admitted for, FALL METATARSAL FRACTURE UTI SYNCOPE    This is hospital day 1d  OVERNIGHT EVENTS:   None  Subjective: pt s/e at bedside, no   HISTORY OF PRESENTING ILLNESS:   Outpatient Providers	PCP: Khadra ID: Maryann GI: Nitin	    77y/o M with PMH of HIV on HAART (last CD4 400s), ?Parkinsons, hx of depression, hx of cervical disc disease s/p laminectomy (2018) presents with a syncopal episode. Occurred night before admit - pt was going to get a sandwich and then suddenly blacked out at home. Pt lives alone, he had loss of conciousness for unknown period. Denies any preceding CP, palpitations, flushing, dizziness, lightheadedness. When he woke up he didn't check the time and had difficulty standing up due to pain in R leg which was flexed at knee underneath him and back of head. He was bleeding from the posterior scalp but denied being confused, no incontinence or tongue biting. He was able to get up eventually but did not come to hospital due to transport issues. The morning of admit he had residual lightheadedness and some nausea without vomiting, so came to ED. Denied having fever, chills, SOB, CP, palpitations, abd pain, incontinence of bowel or bladder, numbness/tingling, focal weakness. Endorses for 2mo having inc difficulty urinating and some burning when he does. Baseline constipation.    In ED: patient was a trauma code. T99, HR55, /71, RR16, SpO2 98% on ambient air. Pan-CT scans showed prior known cervical disc disease, R lower lobe 'hazy' opacities, with fx of 5th metatarsal of R 5th toe. No PE was found. D-dimer was 2200s, BNP 500s, UA grossly positive with hematuria, lactate elevate at 3.8. Labs remarkable for Cr 1.5 (baseline ~1-1.2) but otherwise WNL. Orthostatics were positive in ED, 160/72 lying to 133/65 standing. 2L IVF given and pt started on ceftriaxone. Patient admitted for further workup.  Of note pt a poor historian for his meds - doesn't know doses/names. Confirmed his prescriptions with [facility] but unclear if he is taking these appropriately.     Past Medical, Past Surgical, and Family History:  PAST MEDICAL HISTORY:  Depression     HIV disease.     PAST SURGICAL HISTORY:  H/O total shoulder replacement, right     S/P laminectomy cervical spine, 2018 (Dr. 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     FAMILY HISTORY:  FH: diabetes mellitus, mother.    Social History:  Social History (marital status, living situation, occupation, tobacco use, alcohol and drug use, and sexual history): Prior smoker, quit 50 years ago with 1pk/day for 10 years. Using CBD oil for 'chronic pain' in neck. Somewhat poor historian for his medications and care. Drinks 1 wine or vodka-tonic a day, denies binge drinking. No other drug use. Lives alone, has x2 daughters in [place] & [place]. Walks independently at home but has cane/walker if he needs.	      MEDICATIONS:  STANDING MEDICATIONS  abacavir 600 mG/dolutegravir 50 mG/lamiVUDine 300 mG 1 Tablet(s) Oral daily  carbidopa/levodopa  25/100 3 Tablet(s) Oral <User Schedule>  carbidopa/levodopa  25/100 2 Tablet(s) Oral at bedtime  cefTRIAXone   IVPB 1000 milliGRAM(s) IV Intermittent every 24 hours  chlorhexidine 4% Liquid 1 Application(s) Topical <User Schedule>  docusate sodium 100 milliGRAM(s) Oral three times a day  enoxaparin Injectable 40 milliGRAM(s) SubCutaneous daily  pantoprazole    Tablet 40 milliGRAM(s) Oral before breakfast  senna 2 Tablet(s) Oral at bedtime  sodium chloride 0.9%. 1000 milliLiter(s) IV Continuous <Continuous>    PRN MEDICATIONS  acetaminophen   Tablet .. 650 milliGRAM(s) Oral every 6 hours PRN  meclizine 12.5 milliGRAM(s) Oral three times a day PRN  ondansetron Injectable 4 milliGRAM(s) IV Push three times a day PRN  prochlorperazine   Injectable 10 milliGRAM(s) IV Push every 8 hours PRN    VITALS:   T(F): 98.1  HR: 54  BP: 115/57  RR: 18  SpO2: 97%    LABS:                        12.4   9.27  )-----------( 130      ( 27 Aug 2019 06:16 )             37.0     08-27    142  |  112<H>  |  19  ----------------------------<  91  4.5   |  17  |  1.3    Ca    8.5      27 Aug 2019 06:16  Mg     1.9     08-27    TPro  7.3  /  Alb  4.5  /  TBili  0.9  /  DBili  x   /  AST  16  /  ALT  11  /  AlkPhos  64  08-26    PT/INR - ( 26 Aug 2019 08:25 )   PT: 12.20 sec;   INR: 1.06 ratio         PTT - ( 26 Aug 2019 08:25 )  PTT:31.6 sec  Urinalysis Basic - ( 26 Aug 2019 15:51 )    Color: Yellow / Appearance: Turbid / SG: >=1.030 / pH: x  Gluc: x / Ketone: Negative  / Bili: Negative / Urobili: 1.0   Blood: x / Protein: 30 / Nitrite: Positive   Leuk Esterase: Large / RBC: 10-20 /HPF / WBC >50 /HPF   Sq Epi: x / Non Sq Epi: occ /HPF / Bacteria: Many        Troponin T, Serum: 0.02 ng/mL <H> (08-27-19 @ 06:16)  Lactate, Blood: 1.6 mmol/L (08-27-19 @ 06:16)  Lactate, Blood: 1.3 mmol/L (08-26-19 @ 20:35)      CARDIAC MARKERS ( 27 Aug 2019 06:16 )  x     / 0.02 ng/mL / x     / x     / x      CARDIAC MARKERS ( 26 Aug 2019 08:25 )  x     / <0.01 ng/mL / x     / x     / x          RADIOLOGY:    PHYSICAL EXAM: The pt was admitted for, FALL METATARSAL FRACTURE UTI SYNCOPE    This is hospital day 1d  OVERNIGHT EVENTS:   None  Subjective: pt s/e at bedside, c/o dizziness, lightheadedness, multiple syncopal episodes. Denies HA  HISTORY OF PRESENTING ILLNESS:   Outpatient Providers	PCP: Khadra ID: Maryann GI: Nitin	    77y/o M with PMH of HIV on HAART (last CD4 400s), ?Parkinsons, hx of depression, hx of cervical disc disease s/p laminectomy (2018) presents with a syncopal episode. Occurred night before admit - pt was going to get a sandwich and then suddenly blacked out at home. Pt lives alone, he had loss of conciousness for unknown period. Denies any preceding CP, palpitations, flushing, dizziness, lightheadedness. When he woke up he didn't check the time and had difficulty standing up due to pain in R leg which was flexed at knee underneath him and back of head. He was bleeding from the posterior scalp but denied being confused, no incontinence or tongue biting. He was able to get up eventually but did not come to hospital due to transport issues. The morning of admit he had residual lightheadedness and some nausea without vomiting, so came to ED. Denied having fever, chills, SOB, CP, palpitations, abd pain, incontinence of bowel or bladder, numbness/tingling, focal weakness. Endorses for 2mo having inc difficulty urinating and some burning when he does. Baseline constipation.    In ED: patient was a trauma code. T99, HR55, /71, RR16, SpO2 98% on ambient air. Pan-CT scans showed prior known cervical disc disease, R lower lobe 'hazy' opacities, with fx of 5th metatarsal of R 5th toe. No PE was found. D-dimer was 2200s, BNP 500s, UA grossly positive with hematuria, lactate elevate at 3.8. Labs remarkable for Cr 1.5 (baseline ~1-1.2) but otherwise WNL. Orthostatics were positive in ED, 160/72 lying to 133/65 standing. 2L IVF given and pt started on ceftriaxone. Patient admitted for further workup.  Of note pt a poor historian for his meds - doesn't know doses/names. [facility]irmed [facility] prescriptions with [facility] but unclear if he is taking these appropriately.     Past Medical, Past Surgical, and Family History:  PAST MEDICAL HISTORY:  Depression     HIV disease.     PAST SURGICAL HISTORY:  H/O total shoulder replacement, right     S/P laminectomy cervical spine, 2018 (Dr. 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     FAMILY HISTORY:  FH: diabetes mellitus, mother.    Social History:  Social History (marital status, living situation, occupation, tobacco use, alcohol and drug use, and sexual history): Prior smoker, quit 50 years ago with 1pk/day for 10 years. Using CBD oil for 'chronic pain' in neck. Somewhat poor historian for his medications and care. Drinks 1 wine or vodka-tonic a day, denies binge drinking. No other drug use. Lives alone, has x2 daughters in [place] & [place]. Walks independently at home but has cane/walker if he needs.	      MEDICATIONS:  STANDING MEDICATIONS  abacavir 600 mG/dolutegravir 50 mG/lamiVUDine 300 mG 1 Tablet(s) Oral daily  carbidopa/levodopa  25/100 3 Tablet(s) Oral <User Schedule>  carbidopa/levodopa  25/100 2 Tablet(s) Oral at bedtime  cefTRIAXone   IVPB 1000 milliGRAM(s) IV Intermittent every 24 hours  chlorhexidine 4% Liquid 1 Application(s) Topical <User Schedule>  docusate sodium 100 milliGRAM(s) Oral three times a day  enoxaparin Injectable 40 milliGRAM(s) SubCutaneous daily  pantoprazole    Tablet 40 milliGRAM(s) Oral before breakfast  senna 2 Tablet(s) Oral at bedtime  sodium chloride 0.9%. 1000 milliLiter(s) IV Continuous <Continuous>    PRN MEDICATIONS  acetaminophen   Tablet .. 650 milliGRAM(s) Oral every 6 hours PRN  meclizine 12.5 milliGRAM(s) Oral three times a day PRN  ondansetron Injectable 4 milliGRAM(s) IV Push three times a day PRN  prochlorperazine   Injectable 10 milliGRAM(s) IV Push every 8 hours PRN    VITALS:   T(F): 98.1  HR: 54  BP: 115/57  RR: 18  SpO2: 97%    LABS:                        12.4   9.27  )-----------( 130      ( 27 Aug 2019 06:16 )             37.0     08-27    142  |  112<H>  |  19  ----------------------------<  91  4.5   |  17  |  1.3    Ca    8.5      27 Aug 2019 06:16  Mg     1.9     08-27    TPro  7.3  /  Alb  4.5  /  TBili  0.9  /  DBili  x   /  AST  16  /  ALT  11  /  AlkPhos  64  08-26    PT/INR - ( 26 Aug 2019 08:25 )   PT: 12.20 sec;   INR: 1.06 ratio         PTT - ( 26 Aug 2019 08:25 )  PTT:31.6 sec  Urinalysis Basic - ( 26 Aug 2019 15:51 )    Color: Yellow / Appearance: Turbid / SG: >=1.030 / pH: x  Gluc: x / Ketone: Negative  / Bili: Negative / Urobili: 1.0   Blood: x / Protein: 30 / Nitrite: Positive   Leuk Esterase: Large / RBC: 10-20 /HPF / WBC >50 /HPF   Sq Epi: x / Non Sq Epi: occ /HPF / Bacteria: Many        Troponin T, Serum: 0.02 ng/mL <H> (08-27-19 @ 06:16)  Lactate, Blood: 1.6 mmol/L (08-27-19 @ 06:16)  Lactate, Blood: 1.3 mmol/L (08-26-19 @ 20:35)      CARDIAC MARKERS ( 27 Aug 2019 06:16 )  x     / 0.02 ng/mL / x     / x     / x      CARDIAC MARKERS ( 26 Aug 2019 08:25 )  x     / <0.01 ng/mL / x     / x     / x          RADIOLOGY:  < from: CT Head No Cont (08.26.19 @ 09:33) >  IMPRESSION:     1.  Periventricular and subcortical white matter chronic small vessel   ischemic changes.    2.  No acute fractures, mass effect, midline shift or hemorrhage.      PHYSICAL EXAM:  GENERAL: No acute distress, well-developed  HEAD:  Atraumatic, Normocephalic  EYES: EOMI, PERRLA, conjunctiva and sclera clear  NECK: Supple, no lymphadenopathy, no JVD  CHEST/LUNG: CTAB; No wheezes, rales, or rhonchi  HEART: bradycardic on tele, min 48 bpm, regular rhythm; No murmurs, rubs, or gallops  ABDOMEN: Soft, non-tender, non-distended; normal bowel sounds, no organomegaly  EXTREMITIES:  2+ peripheral pulses b/l, No clubbing, cyanosis, or edema  NEUROLOGY: A&O x 3, no focal deficits  SKIN: No rashes or lesions

## 2019-08-27 NOTE — CONSULT NOTE ADULT - SUBJECTIVE AND OBJECTIVE BOX
HPI:  79y/o M with PMH of HIV on HAART (last CD4 400s), ?Parkinsons, hx of depression, hx of cervical disc disease s/p laminectomy (2018) presents with a syncopal episode. Occurred night before admit - pt was going to get a sandwich and then suddenly blacked out at home. Pt lives alone, he had loss of conciousness for unknown period. Denies any preceding CP, palpitations, flushing, dizziness, lightheadedness. When he woke up he didn't check the time and had difficulty standing up due to pain in R leg which was flexed at knee underneath him and back of head. He was bleeding from the posterior scalp but denied being confused, no incontinence or tongue biting. He was able to get up eventually but did not come to hospital due to transport issues. The morning of admit he had residual lightheadedness and some nausea without vomiting, so came to ED. Denied having fever, chills, SOB, CP, palpitations, abd pain, incontinence of bowel or bladder, numbness/tingling, focal weakness. Endorses for 2mo having inc difficulty urinating and some burning when he does. Baseline constipation.    In ED: patient was a trauma code. T99, HR55, /71, RR16, SpO2 98% on ambient air. Pan-CT scans showed prior known cervical disc disease, R lower lobe 'hazy' opacities, with fx of 5th metatarsal of R 5th toe. No PE was found. D-dimer was 2200s, BNP 500s, UA grossly positive with hematuria, lactate elevate at 3.8. Labs remarkable for Cr 1.5 (baseline ~1-1.2) but otherwise WNL. Orthostatics were positive in ED, 160/72 lying to 133/65 standing. 2L IVF given and pt started on ceftriaxone. Patient admitted for further workup.  Of note pt a poor historian for his meds - doesn't know doses/names. [facility]irmed [facility] but unclear if he is taking these appropriately. (26 Aug 2019 17:40)      Neurology Consultation:  Pt here s/p syncopal episode 2 days ago. Does not recall entire episode. Follows with neurology in [facility] for Parkinson's. Was taking Sinemet 25/100 3 tabs TID beginning in June. Course was interrupted at some point and he saw another neurologist who he states taped Sinemet down to 25/100 mg TID which is his current dose. He is also taking some jalil of prescription purchased on the internet from [facility] for Parkinson's sx, we are not sure what medication this is as of now.    PAST MEDICAL & SURGICAL HISTORY:  Depression  Parkinsons  Vagus nerve injury  HIV disease  S/P laminectomy: cervical spine, 2018 (Dr. 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    Home Medications:  FLUoxetine 40 mg oral capsule: 1 cap(s) orally once a day (26 Aug 2019 18:42)  meclizine 12.5 mg oral tablet: 1 tab(s) orally 3 times a day, As Needed (26 Aug 2019 18:42)  raNITIdine 300 mg oral tablet: 1 tab(s) orally once a day (at bedtime) (26 Aug 2019 18:42)  Sinemet 25 mg-100 mg oral tablet: 1 tab TID  Triumeq oral tablet: 1 tab(s) orally once a day 600- tab (26 Aug 2019 18:42)        Neuro Exam:  Orientation: oriented to person, oriented to place and oriented to time but becomes confused at times during exam  Language: no difficulty naming common objects, no difficulty repeating a phrase  Cranial Nerves: visual acuity intact bilaterally, visual fields full to confrontation, pupils equal round and reactive to light, extraocular motion intact, facial sensation intact symmetrically, face symmetrical, hearing was intact bilaterally, tongue and palate midline, head turning and shoulder shrug symmetric and there was no tongue deviation with protrusion.   Motor: 5/5 strength throughout except RUE in which pt has old shoulder injury. (+) generalized rigidity  Sensory exam: light touch was intact.   Coordination: (+) resting tremor (+) pill rolling (+) impaired GAMA's  Deep tendon reflexes:   Biceps right 2+. Biceps left 2+.    Triceps right 2+. Triceps left 2+.  Brachioradialis right 2+. Brachioradialis left 2+.    Patella right 3+. Patella left 3+.    Ankle jerk right 2+. Ankle jerk left 2+.   Plantar responses normal on the right, normal on the left.        Allergies    No Known Allergies    Intolerances      MEDICATIONS  (STANDING):  abacavir 600 mG/dolutegravir 50 mG/lamiVUDine 300 mG 1 Tablet(s) Oral daily  carbidopa/levodopa  25/100 3 Tablet(s) Oral <User Schedule>  carbidopa/levodopa  25/100 2 Tablet(s) Oral at bedtime  cefTRIAXone   IVPB 1000 milliGRAM(s) IV Intermittent every 24 hours  chlorhexidine 4% Liquid 1 Application(s) Topical <User Schedule>  docusate sodium 100 milliGRAM(s) Oral three times a day  enoxaparin Injectable 40 milliGRAM(s) SubCutaneous daily  pantoprazole    Tablet 40 milliGRAM(s) Oral before breakfast  senna 2 Tablet(s) Oral at bedtime  sodium chloride 0.9%. 1000 milliLiter(s) (75 mL/Hr) IV Continuous <Continuous>    MEDICATIONS  (PRN):  acetaminophen   Tablet .. 650 milliGRAM(s) Oral every 6 hours PRN Mild Pain (1 - 3)  meclizine 12.5 milliGRAM(s) Oral three times a day PRN Dizziness  ondansetron Injectable 4 milliGRAM(s) IV Push three times a day PRN Nausea and/or Vomiting  prochlorperazine   Injectable 10 milliGRAM(s) IV Push every 8 hours PRN nausea      LABS:                        12.4   9.27  )-----------( 130      ( 27 Aug 2019 06:16 )             37.0     08-27    142  |  112<H>  |  19  ----------------------------<  91  4.5   |  17  |  1.3    Ca    8.5      27 Aug 2019 06:16  Mg     1.9     08-27    TPro  7.3  /  Alb  4.5  /  TBili  0.9  /  DBili  x   /  AST  16  /  ALT  11  /  AlkPhos  64  08-26    PT/INR - ( 26 Aug 2019 08:25 )   PT: 12.20 sec;   INR: 1.06 ratio         PTT - ( 26 Aug 2019 08:25 )  PTT:31.6 sec      < from: CT Head No Cont (08.26.19 @ 09:33) >  1.  Periventricular and subcortical white matter chronic small vessel   ischemic changes.    2.  No acute fractures, mass effect, midline shift or hemorrhage.      < end of copied text >    < from: CT Cervical Spine No Cont (05.16.18 @ 16:57) >  Severe cervical spine degenerative changes with severe spinal stenosis   C3-4, C4-5, C5-6 and C6-7 and multilevel moderate foraminal narrowing.   Associated spinal cord compression better appreciated on the MRI from the  prior day.    < end of copied text > HPI:  79y/o M with PMH of HIV on HAART (last CD4 400s), ?Parkinsons, hx of depression, hx of cervical disc disease s/p laminectomy (2018) presents with a syncopal episode. Occurred night before admit - pt was going to get a sandwich and then suddenly blacked out at home. Pt lives alone, he had loss of conciousness for unknown period. Denies any preceding CP, palpitations, flushing, dizziness, lightheadedness. When he woke up he didn't check the time and had difficulty standing up due to pain in R leg which was flexed at knee underneath him and back of head. He was bleeding from the posterior scalp but denied being confused, no incontinence or tongue biting. He was able to get up eventually but did not come to hospital due to transport issues. The morning of admit he had residual lightheadedness and some nausea without vomiting, so came to ED. Denied having fever, chills, SOB, CP, palpitations, abd pain, incontinence of bowel or bladder, numbness/tingling, focal weakness. Endorses for 2mo having inc difficulty urinating and some burning when he does. Baseline constipation.    In ED: patient was a trauma code. T99, HR55, /71, RR16, SpO2 98% on ambient air. Pan-CT scans showed prior known cervical disc disease, R lower lobe 'hazy' opacities, with fx of 5th metatarsal of R 5th toe. No PE was found. D-dimer was 2200s, BNP 500s, UA grossly positive with hematuria, lactate elevate at 3.8. Labs remarkable for Cr 1.5 (baseline ~1-1.2) but otherwise WNL. Orthostatics were positive in ED, 160/72 lying to 133/65 standing. 2L IVF given and pt started on ceftriaxone. Patient admitted for further workup.  Of note pt a poor historian for his meds - doesn't know doses/names. [facility] but unclear if he is taking these appropriately. (26 Aug 2019 17:40)      Neurology Consultation:  Pt here s/p syncopal episode 2 days ago. Does not recall entire episode. [facility] Was taking Sinemet 25/100 3 tabs TID beginning in June. Course was interrupted at some point and he saw another neurologist who he states taped Sinemet down to 25/100 mg 1 tab TID which is his current dose. He is also taking some jalil of prescription purchased on the internet from [facility] for Parkinson's sx, we are not sure what medication this is as of now.  Noted to be bacteruric    PAST MEDICAL & SURGICAL HISTORY:  Depression  Parkinsons  Vagus nerve injury  HIV disease  S/P laminectomy: cervical spine, 2018 (Dr. 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    Home Medications:  FLUoxetine 40 mg oral capsule: 1 cap(s) orally once a day (26 Aug 2019 18:42)  meclizine 12.5 mg oral tablet: 1 tab(s) orally 3 times a day, As Needed (26 Aug 2019 18:42)  raNITIdine 300 mg oral tablet: 1 tab(s) orally once a day (at bedtime) (26 Aug 2019 18:42)  Sinemet 25 mg-100 mg oral tablet: 1 tab TID  Triumeq oral tablet: 1 tab(s) orally once a day 600- tab (26 Aug 2019 18:42)        Neuro Exam:  Orientation: oriented to person, oriented to place and oriented to time but becomes confused at times during exam  Language: no difficulty naming common objects, no difficulty repeating a phrase  Cranial Nerves: visual acuity intact bilaterally, visual fields full to confrontation, pupils equal round and reactive to light, extraocular motion intact, facial sensation intact symmetrically, face symmetrical, hearing was intact bilaterally, tongue and palate midline, head turning and shoulder shrug symmetric and there was no tongue deviation with protrusion.   Motor: 5/5 strength throughout except RUE in which pt has old shoulder injury. (+) generalized rigidity  Sensory exam: light touch was intact.   Coordination: (+) resting tremor (+) pill rolling (+) impaired GAMA's  Deep tendon reflexes:   Biceps right 2+. Biceps left 2+.    Triceps right 2+. Triceps left 2+.  Brachioradialis right 2+. Brachioradialis left 2+.    Patella right 3+. Patella left 3+.    Ankle jerk right 2+. Ankle jerk left 2+.   Plantar responses normal on the right, normal on the left.        Allergies    No Known Allergies    Intolerances      MEDICATIONS  (STANDING):  abacavir 600 mG/dolutegravir 50 mG/lamiVUDine 300 mG 1 Tablet(s) Oral daily  carbidopa/levodopa  25/100 3 Tablet(s) Oral <User Schedule>  carbidopa/levodopa  25/100 2 Tablet(s) Oral at bedtime  cefTRIAXone   IVPB 1000 milliGRAM(s) IV Intermittent every 24 hours  chlorhexidine 4% Liquid 1 Application(s) Topical <User Schedule>  docusate sodium 100 milliGRAM(s) Oral three times a day  enoxaparin Injectable 40 milliGRAM(s) SubCutaneous daily  pantoprazole    Tablet 40 milliGRAM(s) Oral before breakfast  senna 2 Tablet(s) Oral at bedtime  sodium chloride 0.9%. 1000 milliLiter(s) (75 mL/Hr) IV Continuous <Continuous>    MEDICATIONS  (PRN):  acetaminophen   Tablet .. 650 milliGRAM(s) Oral every 6 hours PRN Mild Pain (1 - 3)  meclizine 12.5 milliGRAM(s) Oral three times a day PRN Dizziness  ondansetron Injectable 4 milliGRAM(s) IV Push three times a day PRN Nausea and/or Vomiting  prochlorperazine   Injectable 10 milliGRAM(s) IV Push every 8 hours PRN nausea      LABS:                        12.4   9.27  )-----------( 130      ( 27 Aug 2019 06:16 )             37.0     08-27    142  |  112<H>  |  19  ----------------------------<  91  4.5   |  17  |  1.3    Ca    8.5      27 Aug 2019 06:16  Mg     1.9     08-27    TPro  7.3  /  Alb  4.5  /  TBili  0.9  /  DBili  x   /  AST  16  /  ALT  11  /  AlkPhos  64  08-26    PT/INR - ( 26 Aug 2019 08:25 )   PT: 12.20 sec;   INR: 1.06 ratio         PTT - ( 26 Aug 2019 08:25 )  PTT:31.6 sec      < from: CT Head No Cont (08.26.19 @ 09:33) >  1.  Periventricular and subcortical white matter chronic small vessel   ischemic changes.    2.  No acute fractures, mass effect, midline shift or hemorrhage.      < end of copied text >    < from: CT Cervical Spine No Cont (05.16.18 @ 16:57) >  Severe cervical spine degenerative changes with severe spinal stenosis   C3-4, C4-5, C5-6 and C6-7 and multilevel moderate foraminal narrowing.   Associated spinal cord compression better appreciated on the MRI from the  prior day.    < end of copied text >

## 2019-08-27 NOTE — PROGRESS NOTE ADULT - ASSESSMENT
This note is not complete, all recommendations to follow.     #HIV  abacavir 600 mG/dolutegravir 50 mG/lamiVUDine 300 mG 1 Tablet(s) Oral daily    #Parkinson's  - carbidopa/levodopa  25/100 3 Tablet(s) Oral <User Schedule>  - carbidopa/levodopa  25/100 2 Tablet(s) Oral at bedtime    cefTRIAXone   IVPB 1000 milliGRAM(s) IV Intermittent every 24 hours    #GERD  - pantoprazole    Tablet 40 milliGRAM(s) Oral before breakfast    #PPX  - enoxaparin Injectable 40 milliGRAM(s) SubCutaneous daily  - senna/colace    #PRN  - meclizine 12.5 milliGRAM(s) Oral three times a day PRN Dizziness  - ondansetron Injectable 4 milliGRAM(s) IV Push three times a day PRN Nausea and/or Vomiting  - prochlorperazine   Injectable 10 milliGRAM(s) IV Push every 8 hours PRN nausea ASSESSMENT  77 yo M with asymptomatic HIV on triumeq, VL UD, parkinson's, s/p laminectomy, cervical spine hardware, R shoulder replacement, 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     IMPRESSION/RECOMMENDATIONS  #Syncope: +Orthostatics in the ER, possible neurogenic orthostatic hypotension 2/2 Parkinson's, also hx injury to vagal nerve  - TTE, EKG wnl  - Telemetry- if no events for 24h can D/C  - TSH   - CTH neg, chronic ischemic changes  - If other workup unrevealing would start fludrocortisone 0.1mg (monitor K)     #Elevated D-dimer, CT Chest neg for PE  - r/o underlying malignancy    #Dysuria, suspect cystitis  - f/u [facility]  - cefTRIAXone   IVPB 1000 milliGRAM(s) IV Intermittent every 24 hours  - Alicea currently in place- per nursing had retention. Will need TOV  - Would avoid anticholinergics and alpha-adrenergic antagonists given orthostasis    #Small tracheal diverticulum. There are opacities in the trachea and central bronchi compatible with secretions.  Emphysema is   present  - Speech/Swallow eval    #YEN, resolved, likely prerenal 2/2 dehydration    #Lactic acidosis, resolved    #Atelectasis on CT    #fx of 5th metatarsal of R 5th toe    #Asymptomatic HIV (no hx CD4 <200)  - abacavir 600 mG/dolutegravir 50 mG/lamiVUDine 300 mG 1 Tablet(s) Oral daily  - Follows with Dr. Wolf    #Parkinson's  - carbidopa/levodopa  25/100 3 Tablet(s) Oral <User Schedule>  - carbidopa/levodopa  25/100 2 Tablet(s) Oral at bedtime  - May need reduction in dosing as can lead to hypotension    #GERD  - pantoprazole    Tablet 40 milliGRAM(s) Oral before breakfast    #PPX  - enoxaparin Injectable 40 milliGRAM(s) SubCutaneous daily  - senna/colace    #PRN  - meclizine 12.5 milliGRAM(s) Oral three times a day PRN Dizziness  - ondansetron Injectable 4 milliGRAM(s) IV Push three times a day PRN Nausea and/or Vomiting  - prochlorperazine   Injectable 10 milliGRAM(s) IV Push every 8 hours PRN nausea    #Dispo: PT consult    Spectra [id number]

## 2019-08-27 NOTE — CONSULT NOTE ADULT - ATTENDING COMMENTS
Patient examined this afternoon and reports his neurologist was tapering him off of sinemet.  He doesn't report and significant improvement in symptoms on the parkinson medication.  Now concern for infection and on cetriaxone.  Normal LT/PP in LE's.  Patient reports hitting his head.  If significant dizziness still present after hydration and antibiotic treatment then he may benefit from PT and vestibular assessment. Patient examined this afternoon and reports his neurologist was tapering him off of sinemet.  He doesn't report and significant improvement in symptoms on the parkinson medication.  Now concern for infection and on cetriaxone.  Normal LT/PP in LE's.  Patient reports hitting his head.  If significant dizziness still present after hydration and antibiotic treatment then he may benefit from PT and vestibular assessment.    Family notes medications patient was taking are:  *meclizine 12.5 mg tid prn for dizziness/nausea  (on and off it).  hydroxyzine 25 mg one tb po q12 hours as needed for nervousness (no longer takes)  *300 Ran domperidone 10 mg one tablet three times daily as needed for parkinsons, take one-half hour prior to meals for nausea  ranitidine 300 mg daily (only  occassionally takes it).  pantoprazole 40 mg one tab po daily (no longer taking)  ondansetron 4 mg one tab q4-6h prn for nausea (takes occassionally)  metoclopramide 10 mg one tab bid (no longer taking)  fluoxetine 40 mg one capsule qam (no longer taking about a month)  *sinemet 25/100 three tab po three times daily x 30 days. no improvement on this regimen. currently on 1 tab three times daily.    last year nauseated, dizzy, reflux.    last year had cervical spine surgery last may 2018.  was falling frequently prior to surgery then gait improved after surgery. Patient examined this afternoon and reports his neurologist was tapering him off of sinemet.  He doesn't report and significant improvement in symptoms on the parkinson medication.  Now concern for infection and on cetriaxone.  Normal LT/PP in LE's.  Patient reports hitting his head.  If significant dizziness still present after hydration and antibiotic treatment then he may benefit from PT and vestibular assessment.    Family notes medications patient was taking are:  *meclizine 12.5 mg tid prn for dizziness/nausea  (on and off it).  hydroxyzine 25 mg one tb po q12 hours as needed for nervousness (no longer takes)  *300 Ran domperidone 10 mg one tablet three times daily as needed for parkinsons, take one-half hour prior to meals for nausea  ranitidine 300 mg daily (only  occassionally takes it).  pantoprazole 40 mg one tab po daily (no longer taking)  ondansetron 4 mg one tab q4-6h prn for nausea (takes occassionally)  metoclopramide 10 mg one tab bid (no longer taking)  fluoxetine 40 mg one capsule qam (no longer taking about a month)  *sinemet 25/100 three tab po three times daily x 30 days. no improvement on this regimen. currently on 1 tab three times daily.    last year nauseated, dizzy, reflux.  last year had cervical spine surgery last may 2018.  was falling frequently prior to surgery then gait improved after surgery.  family member notes he seemed to be doing best on just a couple of meds zantac and reglan.  (however I did explain reglan can cause or worsen parkinson symptoms).  Agree with medication simplification.

## 2019-08-27 NOTE — CONSULT NOTE ADULT - ASSESSMENT
Impression:  Pt here s/p syncopal episode 2 days ago. Does not recall entire episode. Follows with neurology in [facility] for Parkinson's. Also w/ HIV, follows w/ ID. Was taking Sinemet 25/100 3 tabs TID beginning in June. Course was interrupted at some point and he saw another neurologist who he states taped Sinemet down to 25/100 mg TID which is his current dose. He is also taking some jalil of prescription purchased on the internet from [facility] for Parkinson's sx, we are not sure what medication this is as of now. Daughter is going home to get all meds and supplements. Syncope may be secondary to vagus nerve injury as well as orthostatic hypotension due to Sinemet and unknown medication from [facility].    Plan:  For now, Sinemet 25/100 TID  MRI brain without and with gladys  MRA neck with gladys  MRA head without gladys  PT/OT valencia  REEG  clarify what the patient is taking at home  avoid rapid positional changes  ensure hydration Impression:  Pt here s/p syncopal episode 2 days ago. Does not recall entire episode. Follows with neurology in [facility] for Parkinson's. Also w/ HIV, follows w/ ID. Was taking Sinemet 25/100 3 tabs TID beginning in June. Course was interrupted at some point and he saw another neurologist who he states taped Sinemet down to 25/100 mg t tab TID which is his current dose. He is also taking some jalil of prescription purchased on the internet from [facility] for Parkinson's sx, we are not sure what medication this is as of now. Daughter is going home to get all meds and supplements. Syncope may be secondary to vagus nerve injury as well as orthostatic hypotension due to Sinemet and unknown medication from [facility].    Plan:  For now, Sinemet 25/100 TID  MRI brain without and with gladys  MRA neck with gladys  MRA head without gladys  PT/OT valencia  REEG  clarify what the patient is taking at home  avoid rapid positional changes  treat bacteruria  ensure hydration

## 2019-08-28 LAB
ANION GAP SERPL CALC-SCNC: 12 MMOL/L — SIGNIFICANT CHANGE UP (ref 7–14)
APPEARANCE UR: CLEAR — SIGNIFICANT CHANGE UP
BACTERIA # UR AUTO: NEGATIVE — SIGNIFICANT CHANGE UP
BILIRUB UR-MCNC: NEGATIVE — SIGNIFICANT CHANGE UP
BUN SERPL-MCNC: 18 MG/DL — SIGNIFICANT CHANGE UP (ref 10–20)
CALCIUM SERPL-MCNC: 7.4 MG/DL — LOW (ref 8.5–10.1)
CHLORIDE SERPL-SCNC: 116 MMOL/L — HIGH (ref 98–110)
CO2 SERPL-SCNC: 16 MMOL/L — LOW (ref 17–32)
COLOR SPEC: SIGNIFICANT CHANGE UP
CREAT SERPL-MCNC: 1.1 MG/DL — SIGNIFICANT CHANGE UP (ref 0.7–1.5)
DIFF PNL FLD: ABNORMAL
EPI CELLS # UR: 0 /HPF — SIGNIFICANT CHANGE UP (ref 0–5)
FOLATE SERPL-MCNC: 11 NG/ML — SIGNIFICANT CHANGE UP
FOLATE SERPL-MCNC: 15.6 NG/ML — SIGNIFICANT CHANGE UP
GLUCOSE SERPL-MCNC: 87 MG/DL — SIGNIFICANT CHANGE UP (ref 70–99)
GLUCOSE UR QL: NEGATIVE — SIGNIFICANT CHANGE UP
HCT VFR BLD CALC: 30.8 % — LOW (ref 42–52)
HGB BLD-MCNC: 10 G/DL — LOW (ref 14–18)
HYALINE CASTS # UR AUTO: 0 /LPF — SIGNIFICANT CHANGE UP (ref 0–7)
KETONES UR-MCNC: NEGATIVE — SIGNIFICANT CHANGE UP
LEUKOCYTE ESTERASE UR-ACNC: ABNORMAL
MCHC RBC-ENTMCNC: 31 PG — SIGNIFICANT CHANGE UP (ref 27–31)
MCHC RBC-ENTMCNC: 32.5 G/DL — SIGNIFICANT CHANGE UP (ref 32–37)
MCV RBC AUTO: 95.4 FL — HIGH (ref 80–94)
NITRITE UR-MCNC: NEGATIVE — SIGNIFICANT CHANGE UP
NRBC # BLD: 0 /100 WBCS — SIGNIFICANT CHANGE UP (ref 0–0)
PH UR: 6.5 — SIGNIFICANT CHANGE UP (ref 5–8)
PLATELET # BLD AUTO: 106 K/UL — LOW (ref 130–400)
POTASSIUM SERPL-MCNC: 3.5 MMOL/L — SIGNIFICANT CHANGE UP (ref 3.5–5)
POTASSIUM SERPL-SCNC: 3.5 MMOL/L — SIGNIFICANT CHANGE UP (ref 3.5–5)
PROT UR-MCNC: ABNORMAL
RBC # BLD: 3.23 M/UL — LOW (ref 4.7–6.1)
RBC # FLD: 14 % — SIGNIFICANT CHANGE UP (ref 11.5–14.5)
RBC CASTS # UR COMP ASSIST: 9 /HPF — HIGH (ref 0–4)
SODIUM SERPL-SCNC: 144 MMOL/L — SIGNIFICANT CHANGE UP (ref 135–146)
SP GR SPEC: 1.02 — SIGNIFICANT CHANGE UP (ref 1.01–1.02)
TSH SERPL-MCNC: 2 UIU/ML — SIGNIFICANT CHANGE UP (ref 0.27–4.2)
UROBILINOGEN FLD QL: SIGNIFICANT CHANGE UP
VIT B12 SERPL-MCNC: 396 PG/ML — SIGNIFICANT CHANGE UP (ref 232–1245)
VIT B12 SERPL-MCNC: 489 PG/ML — SIGNIFICANT CHANGE UP (ref 232–1245)
WBC # BLD: 5.12 K/UL — SIGNIFICANT CHANGE UP (ref 4.8–10.8)
WBC # FLD AUTO: 5.12 K/UL — SIGNIFICANT CHANGE UP (ref 4.8–10.8)
WBC UR QL: 17 /HPF — HIGH (ref 0–5)

## 2019-08-28 PROCEDURE — 70549 MR ANGIOGRAPH NECK W/O&W/DYE: CPT | Mod: 26

## 2019-08-28 PROCEDURE — 70544 MR ANGIOGRAPHY HEAD W/O DYE: CPT | Mod: 26,59

## 2019-08-28 PROCEDURE — 70548 MR ANGIOGRAPHY NECK W/DYE: CPT | Mod: 26

## 2019-08-28 PROCEDURE — 70553 MRI BRAIN STEM W/O & W/DYE: CPT | Mod: 26

## 2019-08-28 RX ORDER — CARBIDOPA AND LEVODOPA 25; 100 MG/1; MG/1
1 TABLET ORAL EVERY 8 HOURS
Refills: 0 | Status: DISCONTINUED | OUTPATIENT
Start: 2019-08-28 | End: 2019-08-30

## 2019-08-28 RX ADMIN — SODIUM CHLORIDE 75 MILLILITER(S): 9 INJECTION INTRAMUSCULAR; INTRAVENOUS; SUBCUTANEOUS at 20:09

## 2019-08-28 RX ADMIN — PANTOPRAZOLE SODIUM 40 MILLIGRAM(S): 20 TABLET, DELAYED RELEASE ORAL at 05:47

## 2019-08-28 RX ADMIN — CEFTRIAXONE 100 MILLIGRAM(S): 500 INJECTION, POWDER, FOR SOLUTION INTRAMUSCULAR; INTRAVENOUS at 18:00

## 2019-08-28 RX ADMIN — SENNA PLUS 2 TABLET(S): 8.6 TABLET ORAL at 21:58

## 2019-08-28 RX ADMIN — CHLORHEXIDINE GLUCONATE 1 APPLICATION(S): 213 SOLUTION TOPICAL at 05:46

## 2019-08-28 RX ADMIN — ENOXAPARIN SODIUM 40 MILLIGRAM(S): 100 INJECTION SUBCUTANEOUS at 13:05

## 2019-08-28 RX ADMIN — SODIUM CHLORIDE 75 MILLILITER(S): 9 INJECTION INTRAMUSCULAR; INTRAVENOUS; SUBCUTANEOUS at 00:51

## 2019-08-28 NOTE — PHYSICAL THERAPY INITIAL EVALUATION ADULT - PLANNED THERAPY INTERVENTIONS, PT EVAL
balance training/gait training/strengthening/motor coordination training/neuromuscular re-education/bed mobility training/transfer training

## 2019-08-28 NOTE — PROGRESS NOTE ADULT - SUBJECTIVE AND OBJECTIVE BOX
pt is a patient of Dr. Arvizu's. And was seen for cervical stenosis and fusion in the past. Pt seen at bedside with Dr. Arvizu. She recommends getting MRI C spine non contrast and neurology to work up syncopal episodes. Possible seizure activity. D/w attending and patient

## 2019-08-28 NOTE — PROGRESS NOTE ADULT - SUBJECTIVE AND OBJECTIVE BOX
The pt was admitted for, FALL METATARSAL FRACTURE UTI SYNCOPE    HPI:  77y/o M with PMH of HIV on HAART (last CD4 400s), ?Parkinsons, hx of depression, hx of cervical disc disease s/p laminectomy (2018) presents with a syncopal episode. Occurred night before admit - pt was going to get a sandwich and then suddenly blacked out at home. Pt lives alone, he had loss of consciousness for unknown period. Denies any preceding CP, palpitations, flushing, dizziness, lightheadedness. When he woke up he didn't check the time and had difficulty standing up due to pain in R leg which was flexed at knee underneath him and back of head. He was bleeding from the posterior scalp but denied being confused, no incontinence or tongue biting. He was able to get up eventually but did not come to hospital due to transport issues. The morning of admit he had residual lightheadedness and some nausea without vomiting, so came to ED. Denied having fever, chills, SOB, CP, palpitations, abd pain, incontinence of bowel or bladder, numbness/tingling, focal weakness. Endorses for 2mo having inc difficulty urinating and some burning when he does. Baseline constipation.    In ED: patient was a trauma code. T99, HR55, /71, RR16, SpO2 98% on ambient air. Pan-CT scans showed prior known cervical disc disease, R lower lobe 'hazy' opacities, with fx of 5th metatarsal of R 5th toe. No PE was found. D-dimer was 2200s, BNP 500s, UA grossly positive with hematuria, lactate elevate at 3.8. Labs remarkable for Cr 1.5 (baseline ~1-1.2) but otherwise WNL. Orthostatics were positive in ED, 160/72 lying to 133/65 standing. 2L IVF given and pt started on ceftriaxone. Patient admitted for further workup.  Of note pt a poor historian for his meds - doesn't know doses/names. Confirmed his prescriptions with his [facility] but unclear if he is taking these appropriately.     Past Medical, Past Surgical, and Family History:  PAST MEDICAL HISTORY:  Depression     HIV disease.     PAST SURGICAL HISTORY:  H/O total shoulder replacement, right     S/P laminectomy cervical spine, 2018 (Dr. 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     FAMILY HISTORY:  FH: diabetes mellitus, mother.    Social History:  Social History (marital status, living situation, occupation, tobacco use, alcohol and drug use, and sexual history): Prior smoker, quit 50 years ago with 1pk/day for 10 years. Using CBD oil for 'chronic pain' in neck. Somewhat poor historian for his medications and care. Drinks 1 wine or vodka-tonic a day, denies binge drinking. No other drug use. Lives alone, has x2 daughters in [place] & [place]. Walks independently at home but has cane/walker if he needs.	      MEDICATIONS:  STANDING MEDICATIONS  abacavir 600 mG/dolutegravir 50 mG/lamiVUDine 300 mG 1 Tablet(s) Oral daily  carbidopa/levodopa  25/100 3 Tablet(s) Oral <User Schedule>  carbidopa/levodopa  25/100 2 Tablet(s) Oral at bedtime  cefTRIAXone   IVPB 1000 milliGRAM(s) IV Intermittent every 24 hours  chlorhexidine 4% Liquid 1 Application(s) Topical <User Schedule>  docusate sodium 100 milliGRAM(s) Oral three times a day  enoxaparin Injectable 40 milliGRAM(s) SubCutaneous daily  pantoprazole    Tablet 40 milliGRAM(s) Oral before breakfast  senna 2 Tablet(s) Oral at bedtime  sodium chloride 0.9%. 1000 milliLiter(s) IV Continuous <Continuous>    PRN MEDICATIONS  acetaminophen   Tablet .. 650 milliGRAM(s) Oral every 6 hours PRN  meclizine 12.5 milliGRAM(s) Oral three times a day PRN  ondansetron Injectable 4 milliGRAM(s) IV Push three times a day PRN  prochlorperazine   Injectable 10 milliGRAM(s) IV Push every 8 hours PRN    VITALS:   T(F): 98.1  HR: 54  BP: 115/57  RR: 18  SpO2: 97%    LABS:                        12.4   9.27  )-----------( 130      ( 27 Aug 2019 06:16 )             37.0     08-27    142  |  112<H>  |  19  ----------------------------<  91  4.5   |  17  |  1.3    Ca    8.5      27 Aug 2019 06:16  Mg     1.9     08-27    TPro  7.3  /  Alb  4.5  /  TBili  0.9  /  DBili  x   /  AST  16  /  ALT  11  /  AlkPhos  64  08-26    PT/INR - ( 26 Aug 2019 08:25 )   PT: 12.20 sec;   INR: 1.06 ratio         PTT - ( 26 Aug 2019 08:25 )  PTT:31.6 sec  Urinalysis Basic - ( 26 Aug 2019 15:51 )    Color: Yellow / Appearance: Turbid / SG: >=1.030 / pH: x  Gluc: x / Ketone: Negative  / Bili: Negative / Urobili: 1.0   Blood: x / Protein: 30 / Nitrite: Positive   Leuk Esterase: Large / RBC: 10-20 /HPF / WBC >50 /HPF   Sq Epi: x / Non Sq Epi: occ /HPF / Bacteria: Many        Troponin T, Serum: 0.02 ng/mL <H> (08-27-19 @ 06:16)  Lactate, Blood: 1.6 mmol/L (08-27-19 @ 06:16)  Lactate, Blood: 1.3 mmol/L (08-26-19 @ 20:35)      CARDIAC MARKERS ( 27 Aug 2019 06:16 )  x     / 0.02 ng/mL / x     / x     / x      CARDIAC MARKERS ( 26 Aug 2019 08:25 )  x     / <0.01 ng/mL / x     / x     / x          RADIOLOGY:  < from: CT Head No Cont (08.26.19 @ 09:33) >  IMPRESSION:     1.  Periventricular and subcortical white matter chronic small vessel   ischemic changes.    2.  No acute fractures, mass effect, midline shift or hemorrhage.      PHYSICAL EXAM:  GENERAL: No acute distress, well-developed  HEAD:  Atraumatic, Normocephalic  EYES: EOMI, PERRLA, conjunctiva and sclera clear  NECK: Supple, no lymphadenopathy, no JVD  CHEST/LUNG: CTAB; No wheezes, rales, or rhonchi  HEART: bradycardic on tele, min 48 bpm, regular rhythm; No murmurs, rubs, or gallops  ABDOMEN: Soft, non-tender, non-distended; normal bowel sounds, no organomegaly  EXTREMITIES:  2+ peripheral pulses b/l, No clubbing, cyanosis, or edema  NEUROLOGY: A&O x 3, no focal deficits  SKIN: No rashes or lesions The pt was admitted for, FALL METATARSAL FRACTURE UTI SYNCOPE    HPI:  79y/o M with PMH of HIV on HAART (last CD4 400s), ?Parkinsons, hx of depression, hx of cervical disc disease s/p laminectomy (2018) presents with a syncopal episode. Occurred night before admit - pt was going to get a sandwich and then suddenly blacked out at home. Pt lives alone, he had loss of consciousness for unknown period. Denies any preceding CP, palpitations, flushing, dizziness, lightheadedness. When he woke up he didn't check the time and had difficulty standing up due to pain in R leg which was flexed at knee underneath him and back of head. He was bleeding from the posterior scalp but denied being confused, no incontinence or tongue biting. He was able to get up eventually but did not come to hospital due to transport issues. The morning of admit he had residual lightheadedness and some nausea without vomiting, so came to ED. Denied having fever, chills, SOB, CP, palpitations, abd pain, incontinence of bowel or bladder, numbness/tingling, focal weakness. Endorses for 2mo having inc difficulty urinating and some burning when he does. Baseline constipation.    In ED: patient was a trauma code. T99, HR55, /71, RR16, SpO2 98% on ambient air. Pan-CT scans showed prior known cervical disc disease, R lower lobe 'hazy' opacities, with fx of 5th metatarsal of R 5th toe. No PE was found. D-dimer was 2200s, BNP 500s, UA grossly positive with hematuria, lactate elevate at 3.8. Labs remarkable for Cr 1.5 (baseline ~1-1.2) but otherwise WNL. Orthostatics were positive in ED, 160/72 lying to 133/65 standing. 2L IVF given and pt started on ceftriaxone.     Vital Signs Last 24 Hrs  T(C): 36.3 (28 Aug 2019 05:10), Max: 36.3 (28 Aug 2019 05:10)  T(F): 97.3 (28 Aug 2019 05:10), Max: 97.3 (28 Aug 2019 05:10)  HR: 55 (28 Aug 2019 05:10) (54 - 59)  BP: 131/62 (28 Aug 2019 05:10) (100/55 - 131/62)  BP(mean): --  RR: 18 (28 Aug 2019 05:10) (18 - 20)  SpO2: 97% (27 Aug 2019 18:20) (96% - 97%)                          10.0   5.12  )-----------( 106      ( 28 Aug 2019 06:19 )             30.8     RADIOLOGY:  < from: CT Head No Cont (08.26.19 @ 09:33) >  IMPRESSION:     1.  Periventricular and subcortical white matter chronic small vessel   ischemic changes.    2.  No acute fractures, mass effect, midline shift or hemorrhage.      PHYSICAL EXAM:  GENERAL: No acute distress, well-developed  HEAD:  Atraumatic, Normocephalic  EYES: EOMI, PERRLA, conjunctiva and sclera clear  NECK: Supple, no lymphadenopathy, no JVD  CHEST/LUNG: CTAB; No wheezes, rales, or rhonchi  HEART: bradycardic on tele, min 48 bpm, regular rhythm; No murmurs, rubs, or gallops  ABDOMEN: Soft, non-tender, non-distended; normal bowel sounds, no organomegaly  EXTREMITIES:  2+ peripheral pulses b/l, No clubbing, cyanosis, or edema  NEUROLOGY: A&O x 3, no focal deficits  SKIN: No rashes or lesions The pt was admitted for, FALL METATARSAL FRACTURE UTI SYNCOPE    S: Denies chest pain, palpitations, shortness of breath. AOx4      HPI:  79y/o M with PMH of HIV on HAART (last CD4 400s), ?Parkinsons, hx of depression, hx of cervical disc disease s/p laminectomy (2018) presents with a syncopal episode. Occurred night before admit - pt was going to get a sandwich and then suddenly blacked out at home. Pt lives alone, he had loss of consciousness for unknown period. Denies any preceding CP, palpitations, flushing, dizziness, lightheadedness. When he woke up he didn't check the time and had difficulty standing up due to pain in R leg which was flexed at knee underneath him and back of head. He was bleeding from the posterior scalp but denied being confused, no incontinence or tongue biting. He was able to get up eventually but did not come to hospital due to transport issues. The morning of admit he had residual lightheadedness and some nausea without vomiting, so came to ED. Denied having fever, chills, SOB, CP, palpitations, abd pain, incontinence of bowel or bladder, numbness/tingling, focal weakness. Endorses for 2mo having inc difficulty urinating and some burning when he does. Baseline constipation.    In ED: patient was a trauma code. T99, HR55, /71, RR16, SpO2 98% on ambient air. Pan-CT scans showed prior known cervical disc disease, R lower lobe 'hazy' opacities, with fx of 5th metatarsal of R 5th toe. No PE was found. D-dimer was 2200s, BNP 500s, UA grossly positive with hematuria, lactate elevate at 3.8. Labs remarkable for Cr 1.5 (baseline ~1-1.2) but otherwise WNL. Orthostatics were positive in ED, 160/72 lying to 133/65 standing. 2L IVF given and pt started on ceftriaxone.     Vital Signs Last 24 Hrs  T(C): 36.3 (28 Aug 2019 05:10), Max: 36.3 (28 Aug 2019 05:10)  T(F): 97.3 (28 Aug 2019 05:10), Max: 97.3 (28 Aug 2019 05:10)  HR: 55 (28 Aug 2019 05:10) (54 - 59)  BP: 131/62 (28 Aug 2019 05:10) (100/55 - 131/62)  BP(mean): --  RR: 18 (28 Aug 2019 05:10) (18 - 20)  SpO2: 97% (27 Aug 2019 18:20) (96% - 97%)                          10.0   5.12  )-----------( 106      ( 28 Aug 2019 06:19 )             30.8     RADIOLOGY:  < from: CT Head No Cont (08.26.19 @ 09:33) >  IMPRESSION:     1.  Periventricular and subcortical white matter chronic small vessel   ischemic changes.    2.  No acute fractures, mass effect, midline shift or hemorrhage.      PHYSICAL EXAM:  GENERAL: No acute distress, well-developed  HEAD:  Atraumatic, Normocephalic  EYES: EOMI, PERRLA, conjunctiva and sclera clear  NECK: Supple, no lymphadenopathy, no JVD  CHEST/LUNG: CTAB; No wheezes, rales, or rhonchi  HEART: bradycardic on tele, min 48 bpm, regular rhythm; No murmurs, rubs, or gallops  ABDOMEN: Soft, non-tender, non-distended; normal bowel sounds, no organomegaly  EXTREMITIES:  2+ peripheral pulses b/l, No clubbing, cyanosis, or edema  NEUROLOGY: A&O x 3, no focal deficits  SKIN: No rashes or lesions

## 2019-08-28 NOTE — PROGRESS NOTE ADULT - SUBJECTIVE AND OBJECTIVE BOX
TOMY ESPINOSA  78y, Male  Allergy: No Known Allergies      CHIEF COMPLAINT: syncope (28 Aug 2019 10:03)      INTERVAL EVENTS/HPI  - No acute events overnight  - T(F): , Max: 97.3 (08-28-19 @ 05:10)  - Denies any worsening symptoms  - Tolerating medication  - WBC Count: 5.12 K/uL (08-28-19 @ 06:19)      ROS  General: Denies rigors, nightsweats  HEENT: Denies headache, rhinorrhea, sore throat, eye pain  CV: Denies CP, palpitations  PULM: Denies SOB, wheezing  GI: Denies hematemesis, hematochezia, melena  : Denies discharge, hematuria  MSK: Denies arthralgias, myalgias  SKIN: Denies rash, lesions  NEURO: Denies paresthesias, weakness  PSYCH: Denies depression, anxiety    VITALS:  T(F): 97.3, Max: 97.3 (08-28-19 @ 05:10)  HR: 55  BP: 131/62  RR: 18Vital Signs Last 24 Hrs  T(C): 36.3 (28 Aug 2019 05:10), Max: 36.3 (28 Aug 2019 05:10)  T(F): 97.3 (28 Aug 2019 05:10), Max: 97.3 (28 Aug 2019 05:10)  HR: 55 (28 Aug 2019 05:10) (54 - 59)  BP: 131/62 (28 Aug 2019 05:10) (100/55 - 131/62)  BP(mean): --  RR: 18 (28 Aug 2019 05:10) (18 - 20)  SpO2: 97% (27 Aug 2019 18:20) (96% - 97%)    PHYSICAL EXAM:  Gen: thin M NAD, resting in bed  HEENT: Normocephalic, atraumatic  Neck: supple, no lymphadenopathy  CV: Regular rate & regular rhythm  Lungs: decreased BS at bases, no fremitus  Abdomen: Soft, BS present no suprapubic tenderness, no CVAT   Ext: Warm, well perfused  Neuro: non focal, awake  Skin: no rash, no erythema  Lines: no phlebitis      FH: Non-contributory  Social Hx: Non-contributory    TESTS & MEASUREMENTS:                        10.0   5.12  )-----------( 106      ( 28 Aug 2019 06:19 )             30.8     08-28    144  |  116<H>  |  18  ----------------------------<  87  3.5   |  16<L>  |  1.1    Ca    7.4<L>      28 Aug 2019 06:19  Mg     1.9     08-27      eGFR if Non African American: 65 mL/min/1.73M2 (08-28-19 @ 06:19)  eGFR if African American: 75 mL/min/1.73M2 (08-28-19 @ 06:19)      Urinalysis Basic - ( 26 Aug 2019 15:51 )    Color: Yellow / Appearance: Turbid / SG: >=1.030 / pH: x  Gluc: x / Ketone: Negative  / Bili: Negative / Urobili: 1.0   Blood: x / Protein: 30 / Nitrite: Positive   Leuk Esterase: Large / RBC: 10-20 /HPF / WBC >50 /HPF   Sq Epi: x / Non Sq Epi: occ /HPF / Bacteria: Many          Lactate, Blood: 1.6 mmol/L (08-27-19 @ 06:16)  Lactate, Blood: 1.3 mmol/L (08-26-19 @ 20:35)  Blood Gas Venous - Lactate: 3.6 mmoL/L (08-26-19 @ 09:07)  Lactate, Blood: 3.8 mmol/L (08-26-19 @ 08:25)      INFECTIOUS DISEASES TESTING      RADIOLOGY & ADDITIONAL TESTS:  I have personally reviewed the last Chest xray  CXR  Xray Chest 1 View AP/PA:   EXAM:  XR CHEST FRONTAL 1V            PROCEDURE DATE:  08/26/2019            INTERPRETATION:  Clinical History / Reason for exam: Post fall    Comparison : Chest radiograph May 22, 2018.    Technique/Positioning: AP.    Findings:    Support devices: None.    Cardiac/mediastinum/hilum: Unremarkable.    Lung parenchyma/Pleura: Within normal limits.    Skeleton/soft tissues: Stable.    Impression:      No radiographic evidence of acute cardiopulmonary disease.                      SUSAN NICHOLS M.D., ATTENDING RADIOLOGIST  This document has been electronically signed. Aug 26 2019 11:41AM             (08-26-19 @ 10:29)      CT  CT Abdomen and Pelvis w/ IV Cont:   EXAM:  CT ABDOMEN AND PELVIS IC        EXAM:  CT ANGIO CHEST (W)AW IC            PROCEDURE DATE:  08/26/2019            INTERPRETATION:  CLINICAL STATEMENT: Shortness of breath. Clinical   diagnosis of pulmonary embolus.    TECHNIQUE: Contiguous axial CTA images were obtained from the thoracic   inlet to the upper abdomen following administration of 100 cc Optiray 350   intravenous contrast. Contiguous axial venous phase images were then   obtained through the abdomen and pelvis.  Reformatted images in the   coronal and sagittal planes were acquired. Reformatted axial 3-D MIP   images through the chest were acquired.    COMPARISON CT: CT abdomen and pelvis dated 5/20/2016      FINDINGS:    CHEST:    PULMONARY ARTERIES: There are no pulmonary emboli.    AIRWAYS/LUNGS/PLEURA: Small tracheal diverticulum. There are opacities in   the trachea and central bronchi compatible with secretions.  Emphysema is   present.  There is no pleural effusion. There is no pneumothorax. 4 mm   nodule withinthe right lower lobe, series 4 image 58. There are hazy   opacities within the posterior right lower lobe. Bibasilar subsegmental   atelectasis.    MEDIASTINUM: There are no enlarged mediastinal, hilar or axillary lymph   nodes. The visualized portion of the thyroid gland is unremarkable.    There is a small hiatal hernia.    HEART AND VESSELS: The heart is normal in size. There is no pericardial   effusion.       ABDOMEN/PELVIS:    HEPATOBILIARY: There are subcentimeter hypodensities within liver, too   small to further characterize.     SPLEEN: Unremarkable.    PANCREAS: There is mild pancreatic ductal dilatation, measuring up to 3   mm.    ADRENAL GLANDS: There is bilateral adrenal gland thickening.    KIDNEYS: There are bilateral renal cysts and subcentimeter hypodensities,   too small to further characterize. No evidence of hydronephrosis.    ABDOMINOPELVIC NODES: Unremarkable.    PELVIC ORGANS: There is a Alicea catheter with tip visualized within the   collapsed urinary bladder. There is air within the urinary bladder,   likely from instrumentation.    PERITONEUM/MESENTERY/BOWEL: There are gastric sutures present. There is a   small hiatal hernia. No evidence for bowel obstruction, ascites, or   pneumoperitoneum.     BONES/SOFT TISSUES: There are diffuse degenerative changes. No evidence   of compression fracture. There is right humeral orthopedic hardware.   Partially imaged cervical spine hardware.    IMPRESSION:     No evidence of pulmonary embolism.    Hazy opacities within the posterior right lower lobe; correlate for   aspiration.    4 mm nodule within the right lower lobe. In a high-risk patient, low-dose   CT chest in 12 months is recommended as follow-up.    No evidence of intra-abdominal pathology.      CARDIOLOGY TESTING  Transthoracic Echocardiogram:    EXAM:  2-D ECHO (TTE) COMPLETE        PROCEDURE DATE:  08/27/2019      INTERPRETATION:  REPORT:    TRANSTHORACIC ECHOCARDIOGRAM REPORT         Patient Name:   TOMY ESPINOSA Accession #: [id number]  Medical Rec #:  [id number]        Height:      63.0in 160.0 cm  YOB: 1940       Weight:      114.0 lb 51.71 kg  Patient Age:    78 years        BSA:         1.52 m²  Patient Gender: M               BP:          115/57 mmHg       Date of Exam:        8/27/2019 2:31:27 PM  Referring Physician: [id number] DILLON ALVAREZ  Sonographer:         Dianne Calvo  Reading Physician:   Thor Rubi MD.    Procedure:     2D Echo/Doppler/Color Doppler Complete.  Indications:   I50.9 - Heart Failure, unspecified  Diagnosis:     Heart failure, unspecified - I50.9  Study Details: Technically good study.         Summary:   1. Left ventricular ejection fraction, by visual estimation, is 70 to   75%.   2. Normal left ventricular size and wall thicknesses, with normal   systolic and diastolicfunction.   3. Estimated pulmonary artery systolic pressure is 43.0 mmHg assuming a   right atrial pressure of 3 mmHg, which is consistent with mild pulmonary   hypertension.    PHYSICIAN INTERPRETATION:  Left Ventricle: Normal left ventricular size and wall thicknesses, with   normal systolic and diastolic function. Left ventricular ejection   fraction, by visual estimation, is 70 to 75%.       LV Wall Scoring:  All segments are normal.    Right Ventricle: Normal right ventricular size and function.  Left Atrium: Normal left atrial size.  Right Atrium: Normal right atrial size.  Pericardium: There is no evidence of pericardial effusion.  Mitral Valve: Structurally normal mitral valve, with normal leaflet   excursion. The mitral valve is normal in structure. No evidence of mitral   valve regurgitation is seen.  Tricuspid Valve: Structurally normal tricuspid valve, with normal leaflet   excursion. The tricuspid valve is normal in structure. Trivial tricuspid   regurgitation is visualized. Estimated pulmonary artery systolic pressure   is 43.0 mmHg assuming a right atrial pressure of 3 mmHg, which is   consistent with mild pulmonary hypertension.  Aortic Valve: Normal trileaflet aortic valve with normal opening. The   aortic valve is normal. No evidence of aortic valve regurgitation is seen.  Pulmonic Valve: Structurally normal pulmonic valve, with normal leaflet   excursion. The pulmonic valve is normal. No indication of pulmonic valve   regurgitation.  Aorta: The aortic root and ascending aorta are structurally normal, with   no evidence of dilitation.  Pulmonary Artery: The main pulmonary artery is normal in size.  Venous: The inferior vena cava was normal sized, with respiratory size   variation greater than 50%.       2D AND M-MODE MEASUREMENTS (normal ranges within parentheses):  Left Ventricle:                  Normal   Aorta/Left Atrium:               Normal  IVSd (2D):              1.08 cm (0.7-1.1) AoV Cusp Separation: 1.56 cm   (1.5-2.6)  LVPWd (2D):             1.00 cm (0.7-1.1) Left Atrium (Mmode): 2.65 cm   (1.9-4.0)  LVIDd (2D):             4.39 cm (3.4-5.7) Right Ventricle:  LVIDs (2D):             2.01 cm           RVd (2D):        2.27 cm  LV FS (2D):             54.3 %   (>25%)   TAPSE:           2.50 cm  Relative Wall Thickness  0.46    (<0.42)    SPECTRAL DOPPLER ANALYSIS:  LV DIASTOLIC FUNCTION:  MV Peak E: 0.83 m/s Decel Time: 230 msec  MV Peak A: 0.68 m/s  E/A Ratio: 1.22    LVOT Diam: 1.79 cm    Mitral Valve:  MV P1/2 Time: 66.58 msec  MV Area, PHT: 3.30 cm²    Tricuspid Valve and PA/RV Systolic Pressure: TR Max Velocity: 3.16 m/s RA   Pressure: 3 mmHg RVSP/PASP: 43.0 mmHg    Pulmonic Valve:  PV Max Velocity: 0.86 m/s PV Max PG: 3.0 mmHg PV Mean PG:       [id number] Thor Rubi MD, Electronically signed on 8/27/2019 at 5:42:10   PM              *** Final ***                    THOR RUBI MD  This document has been electronically signed. Aug 27 2019  2:31PM             (08-27-19 @ 14:31)  12 Lead ECG:   Ventricular Rate 69 BPM    Atrial Rate 69 BPM    P-R Interval 178 ms    QRS Duration 92 ms    Q-T Interval 436 ms    QTC Calculation(Bezet) 467 ms    P Axis 73 degrees    R Axis -70 degrees    T Axis 75 degrees    Diagnosis Line Normal sinus rhythm  Left axis deviation  Abnormal ECG    Confirmed by Michelle Zamora MD ([id number]) on 8/26/2019 2:53:57 PM (08-26-19 @ 14:27)      MEDICATIONS  abacavir 600 mG/dolutegravir 50 mG/lamiVUDine 300 mG 1  carbidopa/levodopa  25/100 3  carbidopa/levodopa  25/100 2  cefTRIAXone   IVPB 1000  chlorhexidine 4% Liquid 1  docusate sodium 100  enoxaparin Injectable 40  pantoprazole    Tablet 40  senna 2  sodium chloride 0.9%. 1000      ANTIBIOTICS:  abacavir 600 mG/dolutegravir 50 mG/lamiVUDine 300 mG 1 Tablet(s) Oral daily  cefTRIAXone   IVPB 1000 milliGRAM(s) IV Intermittent every 24 hours      All available historical records have been reviewed

## 2019-08-28 NOTE — CONSULT NOTE ADULT - ASSESSMENT
IMPRESSION: Rehab of  Parksinson's, Orthostasis, right 5th metatarsal fx    PRECAUTIONS: [  ] Cardiac  [  ] Respiratory  [  ] Seizures [  ] Contact Isolation  [  ] Droplet Isolation  [  ] Other    Weight Bearing Status: WBAT RLE with right darco shoe    RECOMMENDATION:    Out of Bed to Chair     DVT/Decubiti Prophylaxis    REHAB PLAN:     [ xx  ] Bedside P/T 3-5 times a week   [   ]   Bedside O/T  2-3 times a week             [   ] No Rehab Therapy Indicated                   [   ]  Speech Therapy   Conditioning/ROM                                    ADL  Bed Mobility                                               Conditioning/ROM  Transfers                                                     Bed Mobility  Sitting /Standing Balance                         Transfers                                        Gait Training                                               Sitting/Standing Balance  Stair Training [   ]Applicable                    Home equipment Eval                                                                        Splinting  [   ] Only      GOALS:   ADL   [ x  ]   Independent                    Transfers  [  x ] Independent                          Ambulation  [  x ] Independent     [ x   ] With device                            [   ]  CG                                                         [   ]  CG                                                                  [   ] CG                            [    ] Min A                                                   [   ] Min A                                                              [   ] Min  A          DISCHARGE PLAN:   [   ]  Good candidate for Intensive Rehabilitation/Hospital based-4A [facility]                                             Will tolerate 3hrs Intensive Rehab Daily                                       [    ]  Short Term Rehab in Skilled Nursing Facility                                       [    ]  Home with Outpatient or  services                                         [xx    ]  Possible Candidate for Intensive Hospital based Rehab; patient doesn't appear interested in 4A. IMPRESSION: Rehab of  Parksinson's, Orthostasis, right 5th metatarsal fx    PRECAUTIONS: [  ] Cardiac  [  ] Respiratory  [  ] Seizures [  ] Contact Isolation  [  ] Droplet Isolation  [  ] Other    Weight Bearing Status: WBAT RLE with right darco shoe is needed.    RECOMMENDATION:   D/C Compazine prn; treatment of orthostasis is suggested    Out of Bed to Chair     DVT/Decubiti Prophylaxis    REHAB PLAN:     [ xx  ] Bedside P/T 3-5 times a week   [   ]   Bedside O/T  2-3 times a week             [   ] No Rehab Therapy Indicated                   [   ]  Speech Therapy   Conditioning/ROM                                    ADL  Bed Mobility                                               Conditioning/ROM  Transfers                                                     Bed Mobility  Sitting /Standing Balance                         Transfers                                        Gait Training                                               Sitting/Standing Balance  Stair Training [   ]Applicable                    Home equipment Eval                                                                        Splinting  [   ] Only      GOALS:   ADL   [ x  ]   Independent                    Transfers  [  x ] Independent                          Ambulation  [  x ] Independent     [ x   ] With device                            [   ]  CG                                                         [   ]  CG                                                                  [   ] CG                            [    ] Min A                                                   [   ] Min A                                                              [   ] Min  A          DISCHARGE PLAN:   [   ]  Good candidate for Intensive Rehabilitation/Hospital based-4A [facility]                                             Will tolerate 3hrs Intensive Rehab Daily                                       [    ]  Short Term Rehab in Skilled Nursing Facility                                       [    ]  Home with Outpatient or  services                                         [xx    ]  Possible Candidate for Intensive Hospital based Rehab; patient doesn't appear interested in 4A.

## 2019-08-28 NOTE — SWALLOW BEDSIDE ASSESSMENT ADULT - SLP PERTINENT HISTORY OF CURRENT PROBLEM
Pt adm s/p syncopal episode and bleeding from posterior scalp, but denied confusion. PMHx: HIV on HAART (last CD4 400s), ?Parkinsons, hx of depression, hx of cervical disc disease s/p laminectomy (2018)- CT Chest: Hazy opacities within the posterior right lower lobe; correlate for aspiration, 4mm nodule w/in RLL. CT Spine: posterior decompression surgery with bilateral laminectomies, rods and screws C3-C6.

## 2019-08-28 NOTE — PROGRESS NOTE ADULT - ATTENDING COMMENTS
Pt with ?orthostasis/syncope of unclear origin (?PD related/meds related/other). Though pt has some adjacent segment stenosis at C6-7 this is not related to his current symptoms. Will follow new MRI c-spine  to assess any progression of stenosis. Cont w/u per neurology.

## 2019-08-28 NOTE — PROGRESS NOTE ADULT - ASSESSMENT
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     IMPRESSION/RECOMMENDATIONS  #Syncope: +Orthostatics in the ER, possible neurogenic orthostatic hypotension 2/2 Parkinson's, also hx injury to vagal nerve  - TTE EF 70-75%, mild pHTN, EKG wnl  - Neurology consult appreciated: MRI brain without and with gladys, MRA neck with gladys, MRA head without gladys  - PT/OT eval  - REEG  - CTH neg, chronic ischemic changes  - If other workup unrevealing would start fludrocortisone 0.1mg (monitor K)     #Bradycardia, monitor  - Thyroid Stimulating Hormone, Serum: 2.00 uIU/mL (08.27.19 @ 16:35)    #Elevated D-dimer, CT Chest neg for PE  - r/o underlying malignancy    #Dysuria, suspect cystitis  - f/u UCX  - cefTRIAXone   IVPB 1000 milliGRAM(s) IV Intermittent every 24 hours  - Alicea currently in place- per nursing had retention. Will need TOV  - Would avoid anticholinergics and alpha-adrenergic antagonists given orthostasis    #Small tracheal diverticulum. There are opacities in the trachea and central bronchi compatible with secretions.  Emphysema is   present  - Speech/Swallow eval appreciated    #YEN, resolved, likely prerenal 2/2 dehydration    #Lactic acidosis, resolved    #Atelectasis on CT    #fx of 5th metatarsal of R 5th toe    #Asymptomatic HIV (no hx CD4 <200)  - abacavir 600 mG/dolutegravir 50 mG/lamiVUDine 300 mG 1 Tablet(s) Oral daily  - Follows with Dr. Wolf    #Parkinson's  - CHANGE to home med carbidopa/levodopa  25/100 1 tab TID  - domperidone 10 mg one tablet three times daily     #GERD  - pantoprazole    Tablet 40 milliGRAM(s) Oral before breakfast    #PPX  - enoxaparin Injectable 40 milliGRAM(s) SubCutaneous daily  - senna/colace    #PRN  - meclizine 12.5 milliGRAM(s) Oral three times a day PRN Dizziness  - ondansetron Injectable 4 milliGRAM(s) IV Push three times a day PRN Nausea and/or Vomiting  - prochlorperazine   Injectable 10 milliGRAM(s) IV Push every 8 hours PRN nausea    #Dispo: PT consult    Spectra [id number]

## 2019-08-28 NOTE — PROGRESS NOTE ADULT - ASSESSMENT
78F with HIV on HAART presenting s/p syncope    # Syncope, likely orthostatic, r/o underlying infection/arrhythmia in combination with HIV status and Parkinsons leading to autonomic dysfxn  - positive orthostatics in ED, rpt orthostatics   - trauma workup neg  - lactate 3.8 -->1.6  - R soleal vein thrombosis  - Tylenol for pain, compazine for nausea   - +UA, repeat UA  - CT Chest revealing hazy opacities w/in post right low lobe, poss aspiration,4mm nodule within R lower lobe  - f/u UCx   - c/w ceftriaxone   - PT consult  - c/w tele monitoring    # Hx of Parkinson's  - c/w sinemet 3 tabs AM/noon, 2 tabs in PM (MDD: 8)- pt states he has been taking a diff dose OP, he has also been taking a prescription from [facility]. Overdosing on carbidipa?  - fluoxetine 40mg Rx but as per pt not using in 2wks (hold for now)  - f/u neuro recs    #YEN on CKD2, urinary retention  - Cr 1.5 on admit, baseline 1.2-1.4, resolved  - s/p 2L IVF in ED, s/p IVF  - c/w velasco for 24hr, d/c velasco, ToV  - monitor BMP in AM    # Asymptomatic HIV (no hx CD4 <200)  - abacavir 600 mG/dolutegravir 50 mG/lamiVUDine 300 mG 1 Tablet(s) Oral daily  - Follows with Dr. Wolf    # Small tracheal diverticulum. There are opacities in the trachea and central bronchi compatible with secretions.  Emphysema is   present  - Speech/Swallow eval    #R 5th metatarsal fx & head lac- c/w splint on R foot, pain control    #Cervical disc disease s/p laminectomy and fusions  - done by Dr. Goins in 2018  - C6-7 mod-severe spinal stenosis noted on scans  - consider NXs recall if pt develops sxs    #RLL lung nodule- 4mm nodule, f/u CT scan outpt 1yr    #MISC:   - DVT: lovenox  - GI: protonix  - Diet: DASH  - Activity: as tolerated, PT consult  - from home, full code 78F with HIV on HAART presenting s/p syncope     # Syncope, likely orthostatic, r/o underlying infection/arrhythmia in combination with HIV status and Parkinson's leading to autonomic dysfxn  - positive orthostatics in ED, rpt orthostatics incomplete as pt unable to stand, repeat today    - trauma workup neg  - lactate 3.8 -->1.6  - Tylenol for pain, compazine for nausea   - +UA, repeat urinalysis pending, continue with ceftriaxone   - CT Chest revealing hazy opacities w/in post right low lobe, poss aspiration,4mm nodule within R lower lobe  - f/u UCx   -neuro following: rec MRI brain without and with gladys, MRA neck with gladys, MRA head without gladys(ordered), EEG(not ordered- neuro)  - PT/OT, PMR ordered     # Hx of Parkinson's  - Neuro folllowing, c/w sinemet 25/100 3 tabs BID and qhs, will continue to appreciate neuro recs   - fluoxetine 40mg Rx but as per pt not using in 2wks (hold for now)    # Small tracheal diverticulum. There are opacities in the trachea and central bronchi compatible with secretions.  Emphysema is   present  - Speech/Swallow eval ordered     # Asymptomatic HIV (no hx CD4 <200)  - abacavir 600 mG/dolutegravir 50 mG/lamiVUDine 300 mG 1 Tablet(s) Oral daily  - Follows with Dr. Wolf     #R 5th metatarsal fx & head lac  - c/w splint on R foot, pain control    #Cervical disc disease s/p laminectomy and fusions  - done by Dr. 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     # Syncope, likely orthostatic, r/o underlying infection/arrhythmia in combination with HIV status and Parkinson's leading to autonomic dysfxn  - positive orthostatics in ED, rpt orthostatics incomplete as pt unable to stand, repeat today    - trauma workup neg  - lactate 3.8 -->1.6  - Tylenol for pain, compazine for nausea   - +UA, repeat urinalysis pending, continue with ceftriaxone for suspected cystitis   -ID following, rec TSH, and starting fludro 0.1mg if wkup unrevealing   - CT Chest revealing hazy opacities w/in post right low lobe, poss aspiration,4mm nodule within R lower lobe  - f/u UCx   -neuro following: rec MRI brain without and with gladys, MRA neck with gladys, MRA head without gladys(ordered), EEG(not ordered- neuro)  - PT/OT, PMR ordered     #suspected cystitis  - infectious disease team following,   # Hx of Parkinson's  - Neuro folllowing, c/w sinemet 25/100 3 tabs BID and qhs, will continue to appreciate neuro recs   - fluoxetine 40mg Rx but as per pt not using in 2wks (hold for now)    # Small tracheal diverticulum. There are opacities in the trachea and central bronchi compatible with secretions.  Emphysema is   present  - Speech/Swallow eval ordered     # Asymptomatic HIV (no hx CD4 <200)  - abacavir 600 mG/dolutegravir 50 mG/lamiVUDine 300 mG 1 Tablet(s) Oral daily  - Follows with Dr. Wolf     #R 5th metatarsal fx & head lac  - c/w splint on R foot, pain control    #Cervical disc disease s/p laminectomy and fusions  - done by Dr. Goins in 2018  - C6-7 mod-severe spinal stenosis noted on scans  - consider NXs recall if pt develops sxs    #RLL lung nodule- 4mm nodule, f/u CT scan outpt 1yr    #YEN on CKD2, resolved  #MISC:   - DVT: lovenox  - GI: protonix  - Diet: DASH  - Activity: as tolerated  - from home, full code  dispo pending: PMR, PT OT, MRI, MRA, neuro f.u, speech swallow. 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     # Syncope, likely orthostatic, r/o underlying infection/arrhythmia in combination with HIV status and Parkinson's leading to autonomic dysfxn  - positive orthostatics in ED, rpt orthostatics incomplete as pt unable to stand, repeat today    - trauma workup neg  - lactate 3.8 -->1.6  - Tylenol for pain, compazine for nausea   - +UA, repeat urinalysis pending, continue with ceftriaxone for suspected cystitis   -ID following, rec TSH, and starting fludro 0.1mg if wkup unrevealing   - CT Chest revealing hazy opacities w/in post right low lobe, poss aspiration,4mm nodule within R lower lobe  - f/u UCx   -neuro following: rec MRI brain without and with gladys, MRA neck with gladys, MRA head without gladys(ordered), EEG(not ordered- neuro)  - PT/OT, PMR ordered     #suspected cystitis  - infectious disease team following,   # Hx of Parkinson's  - Neuro folllowing, c/w sinemet 25/100 3 tabs BID and qhs, will continue to appreciate neuro recs   - fluoxetine 40mg Rx but as per pt not using in 2wks (hold for now)    # Small tracheal diverticulum. There are opacities in the trachea and central bronchi compatible with secretions.  Emphysema is   present  - Speech/Swallow eval rec noted, no overt aspirations     # Asymptomatic HIV (no hx CD4 <200)  - abacavir 600 mG/dolutegravir 50 mG/lamiVUDine 300 mG 1 Tablet(s) Oral daily  - Follows with Dr. Wolf     #R 5th metatarsal fx & head lac  - c/w splint on R foot, pain control    #Cervical disc disease s/p laminectomy and fusions  - done by Dr. Goins in 2018  - C6-7 mod-severe spinal stenosis noted on scans  - consider NXs recall if pt develops sxs    #RLL lung nodule- 4mm nodule, f/u CT scan outpt 1yr    #YEN on CKD2, resolved  #MISC:   - DVT: lovenox  - GI: protonix  - Diet: DASH  - Activity: as tolerated  - from home, full code  dispo pending: PMR, PT OT, MRI, MRA, neuro f.u. 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     # Syncope, likely orthostatic, r/o underlying infection/arrhythmia in combination with HIV status and Parkinson's leading to autonomic dysfxn  - positive orthostatics in ED, rpt orthostatics incomplete as pt unable to stand, repeat today    - trauma workup neg  - lactate 3.8 -->1.6  - Tylenol for pain, compazine for nausea   - +UA, repeat urinalysis pending, continue with ceftriaxone for suspected cystitis   -ID following, rec TSH, and starting fludro 0.1mg if wkup unrevealing   - CT Chest revealing hazy opacities w/in post right low lobe, poss aspiration,4mm nodule within R lower lobe  - f/u UCx   -neuro following: rec MRI brain without and with gladys, MRA neck with gladys, MRA head without gladys(ordered), EEG(not ordered- neuro)  - PT/OT, PMR ordered   -c/w cinemet 25/100 TID,  domperidone 10 mg one tablet three times daily     #suspected cystitis  - infectious disease team following,   # Hx of Parkinson's  - Neuro folllowing, c/w sinemet 25/100  tabs TID and qhs, will continue to appreciate neuro recs  - fluoxetine 40mg Rx but as per pt not using in 2wks (hold for now)    # Small tracheal diverticulum. There are opacities in the trachea and central bronchi compatible with secretions.  Emphysema is   present  - Speech/Swallow eval rec noted, no overt aspirations     # Asymptomatic HIV (no hx CD4 <200)  - abacavir 600 mG/dolutegravir 50 mG/lamiVUDine 300 mG 1 Tablet(s) Oral daily  - Follows with Dr. Wolf     #R 5th metatarsal fx & head lac  - c/w splint on R foot, pain control    #Cervical disc disease s/p laminectomy and fusions  - done by Dr. Goins in 2018  - C6-7 mod-severe spinal stenosis noted on scans  - consider NXs recall if pt develops sxs    #RLL lung nodule- 4mm nodule, f/u CT scan outpt 1yr    #YEN on CKD2, resolved  #MISC:   - DVT: lovenox  - GI: protonix  - Diet: DASH  - Activity: as tolerated  - from home, full code  dispo pending: PMR, PT OT, MRI, MRA, neuro f.u. 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     # Syncope, likely orthostatic, r/o underlying infection/arrhythmia in combination with HIV status and Parkinson's leading to autonomic dysfxn  - positive orthostatics in ED, rpt orthostatics incomplete as pt unable to stand, repeat today    - trauma workup neg  - lactate 3.8 -->1.6  - Tylenol for pain, compazine for nausea   - +UA, repeat urinalysis pending, continue with ceftriaxone for suspected cystitis   -ID following,TSH nml, and starting fludro 0.1mg if wkup unrevealing   - CT Chest revealing hazy opacities w/in post right low lobe, poss aspiration,4mm nodule within R lower lobe  - f/u UCx   -neuro following: rec MRI brain without and with gladys, MRA neck with gladys, MRA head without gladys(ordered), EEG(not ordered- neuro)  - PT/OT, PMR ordered   -c/w cinemet 25/100 TID,  domperidone 10 mg one tablet three times daily     #suspected cystitis  - infectious disease team following,   # Hx of Parkinson's  - Neuro folllowing, c/w sinemet 25/100  tabs TID and qhs, will continue to appreciate neuro recs  - fluoxetine 40mg Rx but as per pt not using in 2wks (hold for now)    # Small tracheal diverticulum. There are opacities in the trachea and central bronchi compatible with secretions.  Emphysema is   present  - Speech/Swallow eval rec noted, no overt aspirations     # Asymptomatic HIV (no hx CD4 <200)  - abacavir 600 mG/dolutegravir 50 mG/lamiVUDine 300 mG 1 Tablet(s) Oral daily  - Follows with Dr. Wolf     #R 5th metatarsal fx & head lac  - c/w splint on R foot, pain control    #Cervical disc disease s/p laminectomy and fusions  - done by Dr. Goins in 2018  - C6-7 mod-severe spinal stenosis noted on scans  - consider NXs recall if pt develops sxs    #RLL lung nodule- 4mm nodule, f/u CT scan outpt 1yr    #YEN on CKD2, resolved  #MISC:   - DVT: lovenox  - GI: protonix  - Diet: DASH  - Activity: as tolerated  - from home, full code  dispo pending: PMR, PT OT, MRI, MRA, neuro f.u. TSH level

## 2019-08-28 NOTE — CONSULT NOTE ADULT - SUBJECTIVE AND OBJECTIVE BOX
HPI:  77y/o M with PMH of HIV on HAART (last CD4 400s), ?Parkinsons, hx of depression, hx of cervical disc disease s/p laminectomy (2018) presents with a syncopal episode. Occurred night before admit - pt was going to get a sandwich and then suddenly blacked out at home. Pt lives alone, he had loss of conciousness for unknown period. Denies any preceding CP, palpitations, flushing, dizziness, lightheadedness. When he woke up he didn't check the time and had difficulty standing up due to pain in R leg which was flexed at knee underneath him and back of head. He was bleeding from the posterior scalp but denied being confused, no incontinence or tongue biting. He was able to get up eventually but did not come to hospital due to transport issues. The morning of admit he had residual lightheadedness and some nausea without vomiting, so came to ED. Denied having fever, chills, SOB, CP, palpitations, abd pain, incontinence of bowel or bladder, numbness/tingling, focal weakness. Endorses for 2mo having inc difficulty urinating and some burning when he does. Baseline constipation.    In ED: patient was a trauma code. T99, HR55, /71, RR16, SpO2 98% on ambient air. Pan-CT scans showed prior known cervical disc disease, R lower lobe 'hazy' opacities, with fx of 5th metatarsal of R 5th toe. No PE was found. D-dimer was 2200s, BNP 500s, UA grossly positive with hematuria, lactate elevate at 3.8. Labs remarkable for Cr 1.5 (baseline ~1-1.2) but otherwise WNL. Orthostatics were positive in ED, 160/72 lying to 133/65 standing. 2L IVF given and pt started on ceftriaxone. Patient admitted for further workup.  Of note pt a poor historian for his meds - doesn't know doses/names. Confirmed his prescriptions with his [facility] but unclear if he is taking these appropriately. (26 Aug 2019 17:40)      PAST MEDICAL & SURGICAL HISTORY:  Depression  HIV disease  S/P laminectomy: cervical spine, 2018 (Dr. 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 he is OK for WBAT on the RLE with a right darco shoe. he amb 75 ft with a walker with assist with mild light headedness. treatment of orthostaisis as he has had falls likely related to this.  TODAY'S SUBJECTIVE & REVIEW OF SYMPTOMS:     Constitutional WNL   Cardio WNL   Resp WNL   GI WNL  Heme WNL  Endo WNL  Skin WNL  MSK WNL  Neuro WNL  Cognitive WNL  Psych WNL      MEDICATIONS  (STANDING):  abacavir 600 mG/dolutegravir 50 mG/lamiVUDine 300 mG 1 Tablet(s) Oral daily  carbidopa/levodopa  25/100 1 Tablet(s) Oral every 8 hours  cefTRIAXone   IVPB 1000 milliGRAM(s) IV Intermittent every 24 hours  chlorhexidine 4% Liquid 1 Application(s) Topical <User Schedule>  docusate sodium 100 milliGRAM(s) Oral three times a day  enoxaparin Injectable 40 milliGRAM(s) SubCutaneous daily  pantoprazole    Tablet 40 milliGRAM(s) Oral before breakfast  senna 2 Tablet(s) Oral at bedtime  sodium chloride 0.9%. 1000 milliLiter(s) (75 mL/Hr) IV Continuous <Continuous>    MEDICATIONS  (PRN):  acetaminophen   Tablet .. 650 milliGRAM(s) Oral every 6 hours PRN Mild Pain (1 - 3)  meclizine 12.5 milliGRAM(s) Oral three times a day PRN Dizziness  ondansetron Injectable 4 milliGRAM(s) IV Push three times a day PRN Nausea and/or Vomiting      FAMILY HISTORY:  FH: diabetes mellitus: mother      Allergies    No Known Allergies    Intolerances        SOCIAL HISTORY:    [  ] Etoh  [  ] Smoking  [  ] Substance abuse     Home Environment:  [x  ] Home Alone  [  ] Lives with Family  [  ] Home Health Aid    Dwelling:  [  ] Apartment  [  ] Private House  [  ] Adult Home  [  ] Skilled Nursing Facility      [  ] Short Term  [  ] Long Term  [  ] Stairs       Elevator [  ]    FUNCTIONAL STATUS PTA: (Check all that apply)  Ambulation: [ x  ]Independent    [  ] Dependent     [  ] Non-Ambulatory  Assistive Device: [  ] SA Cane  [  ]  Q Cane  [ x ] Walker  [  ]  Wheelchair  ADL : [  ] Independent  [  ]  Dependent       Vital Signs Last 24 Hrs  T(C): 36.3 (28 Aug 2019 05:10), Max: 36.3 (28 Aug 2019 05:10)  T(F): 97.3 (28 Aug 2019 05:10), Max: 97.3 (28 Aug 2019 05:10)  HR: 55 (28 Aug 2019 05:10) (54 - 59)  BP: 131/62 (28 Aug 2019 05:10) (100/55 - 131/62)  BP(mean): --  RR: 18 (28 Aug 2019 05:10) (18 - 20)  SpO2: 98% (28 Aug 2019 11:11) (96% - 98%)      PHYSICAL EXAM: Alert & Oriented X3  GENERAL: NAD, well-groomed, well-developed  HEAD:  Atraumatic, Normocephalic  EYES: EOMI, PERRLA, conjunctiva and sclera clear  NECK: Supple, No JVD, Normal thyroid  CHEST/LUNG: Clear to percussion bilaterally; No rales, rhonchi, wheezing, or rubs  HEART: Regular rate and rhythm; No murmurs, rubs, or gallops  ABDOMEN: Soft, Nontender, Nondistended; Bowel sounds present  EXTREMITIES:  2+ Peripheral Pulses, No clubbing, cyanosis, or edema    NERVOUS SYSTEM:  Cranial Nerves 2-12 intact [  ] Abnormal  [  ]  ROM: WFL all extremities [  ]  Abnormal [  ]   rigidity wrists, neg resting tremor, pos postural instab./impaired standing balance  Motor Strength: WFL all extremities  [  ]  Abnormal [x  ]   5-5 LE's   Sensation: intact to light touch [  ] Abnormal [  ]  Reflexes: Symmetric [  ]  Abnormal [  ]    FUNCTIONAL STATUS:  Bed Mobility: Independent [  ]  Supervision [  ]  Needs Assistance [  ]  N/A [  ]  Transfers: Independent [  ]  Supervision [  ]  Needs Assistance [  ]  N/A [  ]   Ambulation: Independent [  ]  Supervision [  ]  Needs Assistance [  ]  N/A [  ]  ADL: Independent [  ] Requires Assistance [  ] N/A [  ]      LABS:                        10.0   5.12  )-----------( 106      ( 28 Aug 2019 06:19 )             30.8     08-28    144  |  116<H>  |  18  ----------------------------<  87  3.5   |  16<L>  |  1.1    Ca    7.4<L>      28 Aug 2019 06:19  Mg     1.9     08-27        Urinalysis Basic - ( 26 Aug 2019 15:51 )    Color: Yellow / Appearance: Turbid / SG: >=1.030 / pH: x  Gluc: x / Ketone: Negative  / Bili: Negative / Urobili: 1.0   Blood: x / Protein: 30 / Nitrite: Positive   Leuk Esterase: Large / RBC: 10-20 /HPF / WBC >50 /HPF   Sq Epi: x / Non Sq Epi: occ /HPF / Bacteria: Many        RADIOLOGY & ADDITIONAL STUDIES:    Assesment:

## 2019-08-28 NOTE — SWALLOW BEDSIDE ASSESSMENT ADULT - SLP GENERAL OBSERVATIONS
Pt received sitting upright in bed w/ no c/o pain. Pt alert and oriented x3. +room air. Pt reported frequent episodes of vomiting since a tumor on lining of his stomach was removed.

## 2019-08-29 LAB
ANION GAP SERPL CALC-SCNC: 13 MMOL/L — SIGNIFICANT CHANGE UP (ref 7–14)
BASOPHILS # BLD AUTO: 0.04 K/UL — SIGNIFICANT CHANGE UP (ref 0–0.2)
BASOPHILS NFR BLD AUTO: 0.8 % — SIGNIFICANT CHANGE UP (ref 0–1)
BUN SERPL-MCNC: 17 MG/DL — SIGNIFICANT CHANGE UP (ref 10–20)
CALCIUM SERPL-MCNC: 8.6 MG/DL — SIGNIFICANT CHANGE UP (ref 8.5–10.1)
CHLORIDE SERPL-SCNC: 112 MMOL/L — HIGH (ref 98–110)
CO2 SERPL-SCNC: 18 MMOL/L — SIGNIFICANT CHANGE UP (ref 17–32)
CREAT SERPL-MCNC: 1.4 MG/DL — SIGNIFICANT CHANGE UP (ref 0.7–1.5)
EOSINOPHIL # BLD AUTO: 0.23 K/UL — SIGNIFICANT CHANGE UP (ref 0–0.7)
EOSINOPHIL NFR BLD AUTO: 4.7 % — SIGNIFICANT CHANGE UP (ref 0–8)
GLUCOSE SERPL-MCNC: 84 MG/DL — SIGNIFICANT CHANGE UP (ref 70–99)
HCT VFR BLD CALC: 37.8 % — LOW (ref 42–52)
HGB BLD-MCNC: 12.6 G/DL — LOW (ref 14–18)
IMM GRANULOCYTES NFR BLD AUTO: 0.2 % — SIGNIFICANT CHANGE UP (ref 0.1–0.3)
LYMPHOCYTES # BLD AUTO: 1.24 K/UL — SIGNIFICANT CHANGE UP (ref 1.2–3.4)
LYMPHOCYTES # BLD AUTO: 25.2 % — SIGNIFICANT CHANGE UP (ref 20.5–51.1)
MAGNESIUM SERPL-MCNC: 1.8 MG/DL — SIGNIFICANT CHANGE UP (ref 1.8–2.4)
MCHC RBC-ENTMCNC: 31.5 PG — HIGH (ref 27–31)
MCHC RBC-ENTMCNC: 33.3 G/DL — SIGNIFICANT CHANGE UP (ref 32–37)
MCV RBC AUTO: 94.5 FL — HIGH (ref 80–94)
MONOCYTES # BLD AUTO: 0.37 K/UL — SIGNIFICANT CHANGE UP (ref 0.1–0.6)
MONOCYTES NFR BLD AUTO: 7.5 % — SIGNIFICANT CHANGE UP (ref 1.7–9.3)
NEUTROPHILS # BLD AUTO: 3.03 K/UL — SIGNIFICANT CHANGE UP (ref 1.4–6.5)
NEUTROPHILS NFR BLD AUTO: 61.6 % — SIGNIFICANT CHANGE UP (ref 42.2–75.2)
NRBC # BLD: 0 /100 WBCS — SIGNIFICANT CHANGE UP (ref 0–0)
PLATELET # BLD AUTO: 95 K/UL — LOW (ref 130–400)
POTASSIUM SERPL-MCNC: 4.6 MMOL/L — SIGNIFICANT CHANGE UP (ref 3.5–5)
POTASSIUM SERPL-SCNC: 4.6 MMOL/L — SIGNIFICANT CHANGE UP (ref 3.5–5)
RBC # BLD: 4 M/UL — LOW (ref 4.7–6.1)
RBC # FLD: 13.7 % — SIGNIFICANT CHANGE UP (ref 11.5–14.5)
SODIUM SERPL-SCNC: 143 MMOL/L — SIGNIFICANT CHANGE UP (ref 135–146)
WBC # BLD: 4.92 K/UL — SIGNIFICANT CHANGE UP (ref 4.8–10.8)
WBC # FLD AUTO: 4.92 K/UL — SIGNIFICANT CHANGE UP (ref 4.8–10.8)

## 2019-08-29 PROCEDURE — 99232 SBSQ HOSP IP/OBS MODERATE 35: CPT

## 2019-08-29 RX ORDER — CIPROFLOXACIN LACTATE 400MG/40ML
500 VIAL (ML) INTRAVENOUS EVERY 12 HOURS
Refills: 0 | Status: DISCONTINUED | OUTPATIENT
Start: 2019-08-29 | End: 2019-08-30

## 2019-08-29 RX ADMIN — Medication 500 MILLIGRAM(S): at 17:24

## 2019-08-29 RX ADMIN — Medication 650 MILLIGRAM(S): at 20:42

## 2019-08-29 RX ADMIN — SODIUM CHLORIDE 75 MILLILITER(S): 9 INJECTION INTRAMUSCULAR; INTRAVENOUS; SUBCUTANEOUS at 12:38

## 2019-08-29 RX ADMIN — ENOXAPARIN SODIUM 40 MILLIGRAM(S): 100 INJECTION SUBCUTANEOUS at 11:40

## 2019-08-29 RX ADMIN — Medication 100 MILLIGRAM(S): at 22:39

## 2019-08-29 RX ADMIN — PANTOPRAZOLE SODIUM 40 MILLIGRAM(S): 20 TABLET, DELAYED RELEASE ORAL at 06:02

## 2019-08-29 RX ADMIN — Medication 650 MILLIGRAM(S): at 21:15

## 2019-08-29 RX ADMIN — SENNA PLUS 2 TABLET(S): 8.6 TABLET ORAL at 22:39

## 2019-08-29 RX ADMIN — CHLORHEXIDINE GLUCONATE 1 APPLICATION(S): 213 SOLUTION TOPICAL at 05:46

## 2019-08-29 NOTE — PROGRESS NOTE ADULT - SUBJECTIVE AND OBJECTIVE BOX
TOMY ESPINOSA  78y, Male  Allergy: No Known Allergies      CHIEF COMPLAINT: syncope (28 Aug 2019 17:39)      INTERVAL EVENTS/HPI  - No acute events overnight  - T(F): , Max: 99 (19 @ 14:12)  - Denies any worsening symptoms  - Tolerating medication  - WBC Count: 4.92 K/uL (19 @ 05:56)      ROS  General: Denies rigors, nightsweats  HEENT: Denies headache, rhinorrhea, sore throat, eye pain  CV: Denies CP, palpitations  PULM: Denies SOB, wheezing  GI: Denies hematemesis, hematochezia, melena  : Denies discharge, hematuria  MSK: Denies arthralgias, myalgias  SKIN: Denies rash, lesions  NEURO: Denies paresthesias, weakness  PSYCH: Denies depression, anxiety    VITALS:  T(F): 96.7, Max: 99 (19 @ 14:12)  HR: 59  BP: 115/51  RR: 18Vital Signs Last 24 Hrs  T(C): 35.9 (29 Aug 2019 05:24), Max: 37.2 (28 Aug 2019 14:12)  T(F): 96.7 (29 Aug 2019 05:24), Max: 99 (28 Aug 2019 14:12)  HR: 59 (28 Aug 2019 14:12) (59 - 59)  BP: 115/51 (28 Aug 2019 14:12) (115/51 - 115/51)  BP(mean): --  RR: 18 (29 Aug 2019 05:24) (18 - 18)  SpO2: 98% (28 Aug 2019 11:11) (98% - 98%)    PHYSICAL EXAM:  Gen: thin M NAD, resting in bed  HEENT: Normocephalic, atraumatic  Neck: supple, no lymphadenopathy  CV: Regular rate & regular rhythm  Lungs: decreased BS at bases, no fremitus  Abdomen: Soft, BS present no suprapubic tenderness, no CVAT   Ext: Warm, well perfused  Neuro: non focal, awake  Skin: no rash, no erythema  Lines: no phlebitis      FH: Non-contributory  Social Hx: Non-contributory    TESTS & MEASUREMENTS:                        12.6   4.92  )-----------( 95       ( 29 Aug 2019 05:56 )             37.8     08-    143  |  112<H>  |  17  ----------------------------<  84  4.6   |  18  |  1.4    Ca    8.6      29 Aug 2019 05:56  Mg     1.8           eGFR if Non African American: 48 mL/min/1.73M2 (19 @ 05:56)  eGFR if African American: 56 mL/min/1.73M2 (19 @ 05:56)      Urinalysis Basic - ( 28 Aug 2019 11:55 )    Color: Light Yellow / Appearance: Clear / S.016 / pH: x  Gluc: x / Ketone: Negative  / Bili: Negative / Urobili: <2 mg/dL   Blood: x / Protein: 30 mg/dL / Nitrite: Negative   Leuk Esterase: Small / RBC: 9 /HPF / WBC 17 /HPF   Sq Epi: x / Non Sq Epi: 0 /HPF / Bacteria: Negative          Lactate, Blood: 1.6 mmol/L (19 @ 06:16)  Lactate, Blood: 1.3 mmol/L (19 @ 20:35)  Blood Gas Venous - Lactate: 3.6 mmoL/L (19 @ 09:07)  Lactate, Blood: 3.8 mmol/L (19 @ 08:25)      INFECTIOUS DISEASES TESTING      RADIOLOGY & ADDITIONAL TESTS:  I have personally reviewed the last Chest xray  CXR      CT  CT Abdomen and Pelvis w/ IV Cont:   EXAM:  CT ABDOMEN AND PELVIS IC        EXAM:  CT ANGIO CHEST (W)AW IC            PROCEDURE DATE:  2019            INTERPRETATION:  CLINICAL STATEMENT: Shortness of breath. Clinical   diagnosis of pulmonary embolus.    TECHNIQUE: Contiguous axial CTA images were obtained from the thoracic   inlet to the upper abdomen following administration of 100 cc Optiray 350   intravenous contrast. Contiguous axial venous phase images were then   obtained through the abdomen and pelvis.  Reformatted images in the   coronal and sagittal planes were acquired. Reformatted axial 3-D MIP   images through the chest were acquired.    COMPARISON CT: CT abdomen and pelvis dated 2016      FINDINGS:    CHEST:    PULMONARY ARTERIES: There are no pulmonary emboli.    AIRWAYS/LUNGS/PLEURA: Small tracheal diverticulum. There are opacities in   the trachea and central bronchi compatible with secretions.  Emphysema is   present.  There is no pleural effusion. There is no pneumothorax. 4 mm   nodule withinthe right lower lobe, series 4 image 58. There are hazy   opacities within the posterior right lower lobe. Bibasilar subsegmental   atelectasis.    MEDIASTINUM: There are no enlarged mediastinal, hilar or axillary lymph   nodes. The visualized portion of the thyroid gland is unremarkable.    There is a small hiatal hernia.    HEART AND VESSELS: The heart is normal in size. There is no pericardial   effusion.       ABDOMEN/PELVIS:    HEPATOBILIARY: There are subcentimeter hypodensities within liver, too   small to further characterize.     SPLEEN: Unremarkable.    PANCREAS: There is mild pancreatic ductal dilatation, measuring up to 3   mm.    ADRENAL GLANDS: There is bilateral adrenal gland thickening.    KIDNEYS: There are bilateral renal cysts and subcentimeter hypodensities,   too small to further characterize. No evidence of hydronephrosis.    ABDOMINOPELVIC NODES: Unremarkable.    PELVIC ORGANS: There is a Alicea catheter with tip visualized within the   collapsed urinary bladder. There is air within the urinary bladder,   likely from instrumentation.    PERITONEUM/MESENTERY/BOWEL: There are gastric sutures present. There is a   small hiatal hernia. No evidence for bowel obstruction, ascites, or   pneumoperitoneum.     BONES/SOFT TISSUES: There are diffuse degenerative changes. No evidence   of compression fracture. There is right humeral orthopedic hardware.   Partially imaged cervical spine hardware.    IMPRESSION:     No evidence of pulmonary embolism.    Hazy opacities within the posterior right lower lobe; correlate for   aspiration.    4 mm nodule within the right lower lobe. In a high-risk patient, low-dose   CT chest in 12 months is recommended as follow-up.    No evidence of intra-abdominal pathology.                        JEREMY TUCKER M.D., RESIDENT RADIOLOGIST  This document has been electronically signed.  MARIA DRISCOLL M.D., ATTENDING RADIOLOGIST  This document has been electronically signed. Aug 26 2019  4:22PM             (19 @ 15:31)  CT Angio Chest w/ IV Cont:   EXAM:  CT ABDOMEN AND PELVIS IC        EXAM:  CT ANGIO CHEST (W)AW IC            PROCEDURE DATE:  2019            INTERPRETATION:  CLINICAL STATEMENT: Shortness of breath. Clinical   diagnosis of pulmonary embolus.    TECHNIQUE: Contiguous axial CTA images were obtained from the thoracic   inlet to the upper abdomen following administration of 100 cc Optiray 350   intravenous contrast. Contiguous axial venous phase images were then   obtained through the abdomen and pelvis.  Reformatted images in the   coronal and sagittal planes were acquired. Reformatted axial 3-D MIP   images through the chest were acquired.    COMPARISON CT: CT abdomen and pelvis dated 2016      FINDINGS:    CHEST:    PULMONARY ARTERIES: There are no pulmonary emboli.    AIRWAYS/LUNGS/PLEURA: Small tracheal diverticulum. There are opacities in   the trachea and central bronchi compatible with secretions.  Emphysema is   present.  There is no pleural effusion. There is no pneumothorax. 4 mm   nodule withinthe right lower lobe, series 4 image 58. There are hazy   opacities within the posterior right lower lobe. Bibasilar subsegmental   atelectasis.    MEDIASTINUM: There are no enlarged mediastinal, hilar or axillary lymph   nodes. The visualized portion of the thyroid gland is unremarkable.    There is a small hiatal hernia.    HEART AND VESSELS: The heart is normal in size. There is no pericardial   effusion.       ABDOMEN/PELVIS:    HEPATOBILIARY: There are subcentimeter hypodensities within liver, too   small to further characterize.     SPLEEN: Unremarkable.    PANCREAS: There is mild pancreatic ductal dilatation, measuring up to 3   mm.    ADRENAL GLANDS: There is bilateral adrenal gland thickening.    KIDNEYS: There are bilateral renal cysts and subcentimeter hypodensities,   too small to further characterize. No evidence of hydronephrosis.    ABDOMINOPELVIC NODES: Unremarkable.    PELVIC ORGANS: There is a Alicea catheter with tip visualized within the   collapsed urinary bladder. There is air within the urinary bladder,   likely from instrumentation.    PERITONEUM/MESENTERY/BOWEL: There are gastric sutures present. There is a   small hiatal hernia. No evidence for bowel obstruction, ascites, or   pneumoperitoneum.     BONES/SOFT TISSUES: There are diffuse degenerative changes. No evidence   of compression fracture. There is right humeral orthopedic hardware.   Partially imaged cervical spine hardware.    IMPRESSION:     No evidence of pulmonary embolism.    Hazy opacities within the posterior right lower lobe; correlate for   aspiration.    4 mm nodule within the right lower lobe. In a high-risk patient, low-dose   CT chest in 12 months is recommended as follow-up.    No evidence of intra-abdominal pathology.                        JEREMY TUCKER M.D., RESIDENT RADIOLOGIST  This document has been electronically signed.  MARIA DRISCOLL M.D., ATTENDING RADIOLOGIST  This document has been electronically signed. Aug 26 2019  4:22PM             (19 @ 15:17)      CARDIOLOGY TESTING  Transthoracic Echocardiogram:    EXAM:  2-D ECHO (TTE) COMPLETE        PROCEDURE DATE:  2019      INTERPRETATION:  REPORT:    TRANSTHORACIC ECHOCARDIOGRAM REPORT         Patient Name:   TOMY ESPINOSA Accession #: [id number]  Medical Rec #:  [id number]        Height:      63.0in 160.0 cm  YOB: 1940       Weight:      114.0 lb 51.71 kg  Patient Age:    78 years        BSA:         1.52 m²  Patient Gender: M               BP:          115/57 mmHg       Date of Exam:        2019 2:31:27 PM  Referring Physician: [id number] DILLON ALVAREZ  Sonographer:         Dianne Calvo  Reading Physician:   Thor Rubi MD.    Procedure:     2D Echo/Doppler/Color Doppler Complete.  Indications:   I50.9 - Heart Failure, unspecified  Diagnosis:     Heart failure, unspecified - I50.9  Study Details: Technically good study.         Summary:   1. Left ventricular ejection fraction, by visual estimation, is 70 to   75%.   2. Normal left ventricular size and wall thicknesses, with normal   systolic and diastolicfunction.   3. Estimated pulmonary artery systolic pressure is 43.0 mmHg assuming a   right atrial pressure of 3 mmHg, which is consistent with mild pulmonary   hypertension.    PHYSICIAN INTERPRETATION:  Left Ventricle: Normal left ventricular size and wall thicknesses, with   normal systolic and diastolic function. Left ventricular ejection   fraction, by visual estimation, is 70 to 75%.       LV Wall Scoring:  All segments are normal.    Right Ventricle: Normal right ventricular size and function.  Left Atrium: Normal left atrial size.  Right Atrium: Normal right atrial size.  Pericardium: There is no evidence of pericardial effusion.  Mitral Valve: Structurally normal mitral valve, with normal leaflet   excursion. The mitral valve is normal in structure. No evidence of mitral   valve regurgitation is seen.  Tricuspid Valve: Structurally normal tricuspid valve, with normal leaflet   excursion. The tricuspid valve is normal in structure. Trivial tricuspid   regurgitation is visualized. Estimated pulmonary artery systolic pressure   is 43.0 mmHg assuming a right atrial pressure of 3 mmHg, which is   consistent with mild pulmonary hypertension.  Aortic Valve: Normal trileaflet aortic valve with normal opening. The   aortic valve is normal. No evidence of aortic valve regurgitation is seen.  Pulmonic Valve: Structurally normal pulmonic valve, with normal leaflet   excursion. The pulmonic valve is normal. No indication of pulmonic valve   regurgitation.  Aorta: The aortic root and ascending aorta are structurally normal, with   no evidence of dilitation.  Pulmonary Artery: The main pulmonary artery is normal in size.  Venous: The inferior vena cava was normal sized, with respiratory size   variation greater than 50%.       2D AND M-MODE MEASUREMENTS (normal ranges within parentheses):  Left Ventricle:                  Normal   Aorta/Left Atrium:               Normal  IVSd (2D):              1.08 cm (0.7-1.1) AoV Cusp Separation: 1.56 cm   (1.5-2.6)  LVPWd (2D):             1.00 cm (0.7-1.1) Left Atrium (Mmode): 2.65 cm   (1.9-4.0)  LVIDd (2D):             4.39 cm (3.4-5.7) Right Ventricle:  LVIDs (2D):             2.01 cm           RVd (2D):        2.27 cm  LV FS (2D):             54.3 %   (>25%)   TAPSE:           2.50 cm  Relative Wall Thickness  0.46    (<0.42)    SPECTRAL DOPPLER ANALYSIS:  LV DIASTOLIC FUNCTION:  MV Peak E: 0.83 m/s Decel Time: 230 msec  MV Peak A: 0.68 m/s  E/A Ratio: 1.22    LVOT Diam: 1.79 cm    Mitral Valve:  MV P1/2 Time: 66.58 msec  MV Area, PHT: 3.30 cm²    Tricuspid Valve and PA/RV Systolic Pressure: TR Max Velocity: 3.16 m/s RA   Pressure: 3 mmHg RVSP/PASP: 43.0 mmHg    Pulmonic Valve:  PV Max Velocity: 0.86 m/s PV Max PG: 3.0 mmHg PV Mean PG:       [id number] Thor Rubi MD, Electronically signed on 2019 at 5:42:10   PM              *** Final ***                    THOR RUBI MD  This document has been electronically signed. Aug 27 2019  2:31PM             (19 @ 14:31)  12 Lead ECG:   Ventricular Rate 69 BPM    Atrial Rate 69 BPM    P-R Interval 178 ms    QRS Duration 92 ms    Q-T Interval 436 ms    QTC Calculation(Bezet) 467 ms    P Axis 73 degrees    R Axis -70 degrees    T Axis 75 degrees    Diagnosis Line Normal sinus rhythm  Left axis deviation  Abnormal ECG    Confirmed by Michelle Zmaora MD ([id number]) on 2019 2:53:57 PM (19 @ 14:27)      MEDICATIONS  abacavir 600 mG/dolutegravir 50 mG/lamiVUDine 300 mG 1  carbidopa/levodopa  25/100 1  cefTRIAXone   IVPB 1000  chlorhexidine 4% Liquid 1  docusate sodium 100  enoxaparin Injectable 40  pantoprazole    Tablet 40  senna 2  sodium chloride 0.9%. 1000      ANTIBIOTICS:  abacavir 600 mG/dolutegravir 50 mG/lamiVUDine 300 mG 1 Tablet(s) Oral daily  cefTRIAXone   IVPB 1000 milliGRAM(s) IV Intermittent every 24 hours      All available historical records have been reviewed

## 2019-08-29 NOTE — PROGRESS NOTE ADULT - ASSESSMENT
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     # Syncope, likely Parkinson's autonomic dysfxn  - positive orthostatics in ED, rpt orthostatics negative 8/28/2019   - trauma workup neg  - lactate 3.8 -->1.6 resolved   - Tylenol for pain, compazine for nausea   -repeat urinalysis w trace leuk est., switch to cipro 500 x2 days per infectious disease team   -ID following,TSH nml, will start fludro 0.1mg if rest of wkup unrevealing   - CT Chest revealing hazy opacities w/in post right low lobe, poss aspiration,4mm nodule within R lower lobe  - f/u UCx - was never collected, pt already on abx, will treat for presumed cystitis w 2 more days of cipro per infectious disease team   -neuro following: rec MRI brain without and with gladys, MRA neck with gladys, MRA head without gladys(ordered) all negative. pending EEG  - neurosx rec MR C spine NC   - Physiatry rec 4a  -c/w cinemet 25/100,  domperidone 10 mg one tablet three times daily     #Thrombocytopenia, unclear etiology  -not on heparin  - ? rocephin-induced thrombocytopenia- D/C and change to PO cipro 500mg daily x 2 more days per infectious disease team     #suspected cystitis  - still w dysuria, repeat urinalysis w mild +LE, no urine culture collected   -c/w cipro x 2 days     # Hx of Parkinson's  - Neuro folllowing, c/w sinemet 25/100 qhs, will continue to appreciate neuro recs  - fluoxetine 40mg Rx but as per pt not using in 2wks (hold for now)    # Small tracheal diverticulum. There are opacities in the trachea and central bronchi compatible with secretions.  Emphysema is   present  - Speech/Swallow eval rec noted, no overt aspirations     # Asymptomatic HIV (no hx CD4 <200)  - abacavir 600 mG/dolutegravir 50 mG/lamiVUDine 300 mG 1 Tablet(s) Oral daily  - Follows with Dr. Wolf     #R 5th metatarsal fx & head lac, stable  - c/w splint on R foot, pain control    #Cervical disc disease s/p laminectomy and fusions  - done by Dr. Goins in 2018  - C6-7 mod-severe spinal stenosis noted on scans  - consider NXs recall if pt develops sxs    #RLL lung nodule- 4mm nodule, f/u CT scan outpt 1yr    #YEN on CKD2, resolved  #MISC:   - DVT: lovenox  - GI: protonix  - Diet: DASH  - Activity: as tolerated  - from home, full code  dispo pending: MRI C spine , EEG, 4a

## 2019-08-29 NOTE — PROGRESS NOTE ADULT - SUBJECTIVE AND OBJECTIVE BOX
The pt was admitted for, FALL METATARSAL FRACTURE UTI SYNCOPE    S: Denies headaches, dizziness, chest pain, palpitations, shortness of breath.     Vital Signs Last 24 Hrs  T(F): 96.7 (29 Aug 2019 05:24), Max: 99 (28 Aug 2019 14:12)  HR: 59 (28 Aug 2019 14:12) (54 - 59)  BP: 115/51 (28 Aug 2019 14:12) (100/55 - 131/62)  BP(mean): --  RR: 18 (29 Aug 2019 05:24) (18 - 20)  SpO2: 98% (28 Aug 2019 11:11) (96% - 98%)    LABS:                        12.6   4.92  )-----------( 95       ( 29 Aug 2019 05:56 )             37.8         143  |  112<H>  |  17  ----------------------------<  84  4.6   |  18  |  1.4    Ca    8.6      29 Aug 2019 05:56  Mg     1.8             Urinalysis Basic - ( 28 Aug 2019 11:55 )    Color: Light Yellow / Appearance: Clear / S.016 / pH: x  Gluc: x / Ketone: Negative  / Bili: Negative / Urobili: <2 mg/dL   Blood: x / Protein: 30 mg/dL / Nitrite: Negative   Leuk Esterase: Small / RBC: 9 /HPF / WBC 17 /HPF   Sq Epi: x / Non Sq Epi: 0 /HPF / Bacteria: Negative    CARDIAC MARKERS ( 27 Aug 2019 16:35 )  x     / 0.01 ng/mL / x     / x     / x        RADIOLOGY:  < from: CT Head No Cont (19 @ 09:33) >  IMPRESSION:     1.  Periventricular and subcortical white matter chronic small vessel   ischemic changes.    2.  No acute fractures, mass effect, midline shift or hemorrhage.      PHYSICAL EXAM:  GENERAL: No acute distress, well-developed  HEAD:  Atraumatic, Normocephalic  CHEST/LUNG: CTAB; No wheezes, rales, or rhonchi  HEART: bradycardic, regular rhythm; No murmurs, rubs, or gallops  ABDOMEN: Soft, non-tender, non-distended; normal bowel sounds, no organomegaly  EXTREMITIES:  2+ peripheral pulses b/l, No clubbing, cyanosis, or edema  NEUROLOGY: A&O x 3, no focal deficits

## 2019-08-29 NOTE — PROGRESS NOTE ADULT - ASSESSMENT
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     IMPRESSION/RECOMMENDATIONS  #Thrombocytopenia, unclear etiology  - only new med is ceftriaxone which can cause thrombocytopenia- D/C and change to PO cipro 500mg daily x 2 more days  - on lovenox not heparin    #Syncope: +Orthostatics in the ER, possible neurogenic orthostatic hypotension 2/2 Parkinson's, also hx injury to vagal nerve  - TTE EF 70-75%, mild pHTN, EKG wnl  - Neurology consult appreciated: pending EEG  - Neurosurgery consult appreciated: MRI C spine non contrast  - MRIs unremarkable   - PT/OT eval  - If other workup unrevealing would start fludrocortisone 0.1mg (monitor K) for orthostasis  - Bernard stockings    #Bradycardia, monitor  - Thyroid Stimulating Hormone, Serum: 2.00 uIU/mL (08.27.19 @ 16:35)    #Elevated D-dimer, CT Chest neg for PE  - r/o underlying malignancy    #Dysuria, suspect acute cystitis  - UCX not sent UA >50 WBC, repeat 17 WBC  - only new med is ceftriaxone which can cause thrombocytopenia- D/C and change to PO cipro 500mg daily x 2 more days    #Small tracheal diverticulum. There are opacities in the trachea and central bronchi compatible with secretions.  Emphysema is   present  - Speech/Swallow eval appreciated    #YEN, resolved, likely prerenal 2/2 dehydration    #Lactic acidosis, resolved    #Atelectasis on CT- PNA ruled out, no suspicion for GNR PNA    #fx of 5th metatarsal of R 5th toe    #Asymptomatic HIV (no hx CD4 <200)  - abacavir 600 mG/dolutegravir 50 mG/lamiVUDine 300 mG 1 Tablet(s) Oral daily  - Follows with Dr. Wolf    #Parkinson's  - home med carbidopa/levodopa  25/100 1 tab TID  - domperidone 10 mg one tablet three times daily at home    #GERD  - pantoprazole    Tablet 40 milliGRAM(s) Oral before breakfast    #PPX  - enoxaparin Injectable 40 milliGRAM(s) SubCutaneous daily  - senna/colace    #PRN  - meclizine 12.5 milliGRAM(s) Oral three times a day PRN Dizziness  - ondansetron Injectable 4 milliGRAM(s) IV Push three times a day PRN Nausea and/or Vomiting  - prochlorperazine   Injectable 10 milliGRAM(s) IV Push every 8 hours PRN nausea    #Dispo: PT consult    Spectra [id number]

## 2019-08-30 ENCOUNTER — TRANSCRIPTION ENCOUNTER (OUTPATIENT)
Age: 79
End: 2019-08-30

## 2019-08-30 VITALS
SYSTOLIC BLOOD PRESSURE: 146 MMHG | DIASTOLIC BLOOD PRESSURE: 82 MMHG | HEART RATE: 49 BPM | TEMPERATURE: 97 F | RESPIRATION RATE: 18 BRPM

## 2019-08-30 LAB
ANION GAP SERPL CALC-SCNC: 10 MMOL/L — SIGNIFICANT CHANGE UP (ref 7–14)
BASOPHILS # BLD AUTO: 0.04 K/UL — SIGNIFICANT CHANGE UP (ref 0–0.2)
BASOPHILS NFR BLD AUTO: 1.1 % — HIGH (ref 0–1)
BUN SERPL-MCNC: 14 MG/DL — SIGNIFICANT CHANGE UP (ref 10–20)
CALCIUM SERPL-MCNC: 8.2 MG/DL — LOW (ref 8.5–10.1)
CHLORIDE SERPL-SCNC: 114 MMOL/L — HIGH (ref 98–110)
CO2 SERPL-SCNC: 21 MMOL/L — SIGNIFICANT CHANGE UP (ref 17–32)
CREAT SERPL-MCNC: 1.2 MG/DL — SIGNIFICANT CHANGE UP (ref 0.7–1.5)
EOSINOPHIL # BLD AUTO: 0.21 K/UL — SIGNIFICANT CHANGE UP (ref 0–0.7)
EOSINOPHIL NFR BLD AUTO: 5.7 % — SIGNIFICANT CHANGE UP (ref 0–8)
GLUCOSE SERPL-MCNC: 90 MG/DL — SIGNIFICANT CHANGE UP (ref 70–99)
HCT VFR BLD CALC: 35.5 % — LOW (ref 42–52)
HGB BLD-MCNC: 11.9 G/DL — LOW (ref 14–18)
IMM GRANULOCYTES NFR BLD AUTO: 0.5 % — HIGH (ref 0.1–0.3)
LYMPHOCYTES # BLD AUTO: 1.05 K/UL — LOW (ref 1.2–3.4)
LYMPHOCYTES # BLD AUTO: 28.7 % — SIGNIFICANT CHANGE UP (ref 20.5–51.1)
MCHC RBC-ENTMCNC: 30.8 PG — SIGNIFICANT CHANGE UP (ref 27–31)
MCHC RBC-ENTMCNC: 33.5 G/DL — SIGNIFICANT CHANGE UP (ref 32–37)
MCV RBC AUTO: 92 FL — SIGNIFICANT CHANGE UP (ref 80–94)
MONOCYTES # BLD AUTO: 0.3 K/UL — SIGNIFICANT CHANGE UP (ref 0.1–0.6)
MONOCYTES NFR BLD AUTO: 8.2 % — SIGNIFICANT CHANGE UP (ref 1.7–9.3)
NEUTROPHILS # BLD AUTO: 2.04 K/UL — SIGNIFICANT CHANGE UP (ref 1.4–6.5)
NEUTROPHILS NFR BLD AUTO: 55.8 % — SIGNIFICANT CHANGE UP (ref 42.2–75.2)
NRBC # BLD: 0 /100 WBCS — SIGNIFICANT CHANGE UP (ref 0–0)
PLATELET # BLD AUTO: 141 K/UL — SIGNIFICANT CHANGE UP (ref 130–400)
POTASSIUM SERPL-MCNC: 3.7 MMOL/L — SIGNIFICANT CHANGE UP (ref 3.5–5)
POTASSIUM SERPL-SCNC: 3.7 MMOL/L — SIGNIFICANT CHANGE UP (ref 3.5–5)
RBC # BLD: 3.86 M/UL — LOW (ref 4.7–6.1)
RBC # FLD: 13.5 % — SIGNIFICANT CHANGE UP (ref 11.5–14.5)
SODIUM SERPL-SCNC: 145 MMOL/L — SIGNIFICANT CHANGE UP (ref 135–146)
WBC # BLD: 3.66 K/UL — LOW (ref 4.8–10.8)
WBC # FLD AUTO: 3.66 K/UL — LOW (ref 4.8–10.8)

## 2019-08-30 PROCEDURE — 99232 SBSQ HOSP IP/OBS MODERATE 35: CPT

## 2019-08-30 PROCEDURE — 95951: CPT | Mod: 26

## 2019-08-30 PROCEDURE — 73562 X-RAY EXAM OF KNEE 3: CPT | Mod: 26,RT

## 2019-08-30 RX ORDER — FLUDROCORTISONE ACETATE 0.1 MG/1
0.1 TABLET ORAL DAILY
Refills: 0 | Status: DISCONTINUED | OUTPATIENT
Start: 2019-08-30 | End: 2019-08-30

## 2019-08-30 RX ORDER — CARBIDOPA AND LEVODOPA 25; 100 MG/1; MG/1
1 TABLET ORAL
Qty: 0 | Refills: 0 | DISCHARGE
Start: 2019-08-30

## 2019-08-30 RX ORDER — CARBIDOPA AND LEVODOPA 25; 100 MG/1; MG/1
3 TABLET ORAL
Qty: 0 | Refills: 0 | DISCHARGE

## 2019-08-30 RX ORDER — FLUDROCORTISONE ACETATE 0.1 MG/1
1 TABLET ORAL
Qty: 30 | Refills: 0
Start: 2019-08-30 | End: 2019-09-28

## 2019-08-30 RX ORDER — MECLIZINE HCL 12.5 MG
1 TABLET ORAL
Qty: 0 | Refills: 0 | DISCHARGE
Start: 2019-08-30

## 2019-08-30 RX ORDER — MECLIZINE HCL 12.5 MG
1 TABLET ORAL
Qty: 0 | Refills: 0 | DISCHARGE

## 2019-08-30 RX ADMIN — Medication 100 MILLIGRAM(S): at 13:03

## 2019-08-30 RX ADMIN — Medication 100 MILLIGRAM(S): at 06:28

## 2019-08-30 RX ADMIN — ENOXAPARIN SODIUM 40 MILLIGRAM(S): 100 INJECTION SUBCUTANEOUS at 13:02

## 2019-08-30 RX ADMIN — Medication 500 MILLIGRAM(S): at 06:28

## 2019-08-30 RX ADMIN — PANTOPRAZOLE SODIUM 40 MILLIGRAM(S): 20 TABLET, DELAYED RELEASE ORAL at 06:28

## 2019-08-30 RX ADMIN — CARBIDOPA AND LEVODOPA 1 TABLET(S): 25; 100 TABLET ORAL at 13:03

## 2019-08-30 NOTE — PROGRESS NOTE ADULT - ASSESSMENT
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     IMPRESSION/RECOMMENDATIONS  #Syncope: +Orthostatics in the ER, possible neurogenic orthostatic hypotension 2/2 Parkinson's, also hx injury to vagal nerve  - TTE EF 70-75%, mild pHTN, EKG wnl  - Neurology consult appreciated  - Neurosurgery consult appreciated: MRI C spine non contrast, can be done as outpatient  - MRIs unremarkable , EEG no seizures  - PT/OT eval- refused inpatient rehab  - START fludrocortisone 0.1mg (monitor K) for orthostasis, can uptitrate as outpatient  - Bernard stockings    #Thrombocytopenia, resolved  - only new med was ceftriaxone which can cause thrombocytopenia- improved once D/C;ed  - on lovenox not heparin    #Bradycardia, monitor  - Thyroid Stimulating Hormone, Serum: 2.00 uIU/mL (08.27.19 @ 16:35)    #Elevated D-dimer, CT Chest neg for PE  - r/o underlying malignancy    #Dysuria, suspect acute cystitis  - UCX not sent UA >50 WBC, repeat 17 WBC  - only new med is ceftriaxone which can cause thrombocytopenia- D/C and change to PO cipro 500mg daily xend TODAY    #Small tracheal diverticulum. There are opacities in the trachea and central bronchi compatible with secretions.  Emphysema is   present  - Speech/Swallow eval appreciated    #YEN, resolved, likely prerenal 2/2 dehydration    #Lactic acidosis, resolved    #Atelectasis on CT- PNA ruled out, no suspicion for GNR PNA    #fx of 5th metatarsal of R 5th toe    #Asymptomatic HIV (no hx CD4 <200)  - abacavir 600 mG/dolutegravir 50 mG/lamiVUDine 300 mG 1 Tablet(s) Oral daily  - Follows with Dr. Wolf    #Parkinson's  - home med carbidopa/levodopa  25/100 1 tab TID  - domperidone 10 mg one tablet three times daily at home    #GERD  - pantoprazole    Tablet 40 milliGRAM(s) Oral before breakfast    #PPX  - enoxaparin Injectable 40 milliGRAM(s) SubCutaneous daily  - senna/colace    #PRN  - meclizine 12.5 milliGRAM(s) Oral three times a day PRN Dizziness  - ondansetron Injectable 4 milliGRAM(s) IV Push three times a day PRN Nausea and/or Vomiting  - prochlorperazine   Injectable 10 milliGRAM(s) IV Push every 8 hours PRN nausea    #Dispo: PT consult    Spectra [id number]

## 2019-08-30 NOTE — DISCHARGE NOTE NURSING/CASE MANAGEMENT/SOCIAL WORK - PATIENT PORTAL LINK FT
You can access the FollowMyHealth Patient Portal offered by [facility] by registering at the following website: [email or web address]. By joining [employer]’s FollowMyHealth portal, you will also be able to view your health information using other applications (apps) compatible with our system.

## 2019-08-30 NOTE — DISCHARGE NOTE PROVIDER - PROVIDER TOKENS
PROVIDER:[TOKEN:[[id number]],FOLLOWUP:[1 week]],PROVIDER:[TOKEN:[[id number]],FOLLOWUP:[1 week]],PROVIDER:[TOKEN:[[id number]],FOLLOWUP:[2 weeks]] PROVIDER:[TOKEN:[[id number]],FOLLOWUP:[1 week]],PROVIDER:[TOKEN:[[id number]],FOLLOWUP:[1 week]],PROVIDER:[TOKEN:[[id number]],FOLLOWUP:[1-3 days]]

## 2019-08-30 NOTE — PROGRESS NOTE ADULT - SUBJECTIVE AND OBJECTIVE BOX
TOMY ESPINOSA  78y, Male  Allergy: No Known Allergies      CHIEF COMPLAINT: syncope (30 Aug 2019 10:33)      INTERVAL EVENTS/HPI  - No acute events overnight  - EEG no events  - T(F): , Max: 98.3 (19 @ 13:19)  - Denies any worsening symptoms  - Tolerating medication  - WBC Count: 3.66 K/uL (19 @ 05:37)      ROS  General: Denies rigors, nightsweats  HEENT: Denies headache, rhinorrhea, sore throat, eye pain  CV: Denies CP, palpitations  PULM: Denies SOB, wheezing  GI: Denies hematemesis, hematochezia, melena  : Denies discharge, hematuria  MSK: Denies arthralgias, myalgias  SKIN: Denies rash, lesions  NEURO: Denies paresthesias, weakness  PSYCH: Denies depression, anxiety    VITALS:  T(F): 97.1, Max: 98.3 (19 @ 13:19)  HR: 43  BP: 134/62  RR: 18Vital Signs Last 24 Hrs  T(C): 36.2 (30 Aug 2019 04:50), Max: 36.8 (29 Aug 2019 13:19)  T(F): 97.1 (30 Aug 2019 04:50), Max: 98.3 (29 Aug 2019 13:19)  HR: 43 (30 Aug 2019 04:50) (43 - 47)  BP: 134/62 (30 Aug 2019 04:50) (125/59 - 141/71)  BP(mean): --  RR: 18 (29 Aug 2019 13:19) (18 - 18)  SpO2: 98% (30 Aug 2019 00:26) (98% - 98%)    PHYSICAL EXAM:  PHYSICAL EXAM:  Gen: thin M NAD, resting in bed  HEENT: Normocephalic, atraumatic  Neck: supple, no lymphadenopathy  CV: Regular rate & regular rhythm  Lungs: decreased BS at bases, no fremitus  Abdomen: Soft, BS present no suprapubic tenderness, no CVAT   Ext: Warm, well perfused  Neuro: non focal, awake  Skin: no rash, no erythema  Lines: no phlebitis      FH: Non-contributory  Social Hx: Non-contributory    TESTS & MEASUREMENTS:                        11.9   3.66  )-----------( 141      ( 30 Aug 2019 05:37 )             35.5         145  |  114<H>  |  14  ----------------------------<  90  3.7   |  21  |  1.2    Ca    8.2<L>      30 Aug 2019 05:37  Mg     1.8           eGFR if Non African American: 58 mL/min/1.73M2 (19 @ 05:37)  eGFR if African American: 68 mL/min/1.73M2 (19 @ 05:37)      Urinalysis Basic - ( 28 Aug 2019 11:55 )    Color: Light Yellow / Appearance: Clear / S.016 / pH: x  Gluc: x / Ketone: Negative  / Bili: Negative / Urobili: <2 mg/dL   Blood: x / Protein: 30 mg/dL / Nitrite: Negative   Leuk Esterase: Small / RBC: 9 /HPF / WBC 17 /HPF   Sq Epi: x / Non Sq Epi: 0 /HPF / Bacteria: Negative          Lactate, Blood: 1.6 mmol/L (19 @ 06:16)  Lactate, Blood: 1.3 mmol/L (19 @ 20:35)  Blood Gas Venous - Lactate: 3.6 mmoL/L (19 @ 09:07)  Lactate, Blood: 3.8 mmol/L (19 @ 08:25)      INFECTIOUS DISEASES TESTING      RADIOLOGY & ADDITIONAL TESTS:  I have personally reviewed the last Chest xray  CXR      CT      CARDIOLOGY TESTING  Transthoracic Echocardiogram:    EXAM:  2-D ECHO (TTE) COMPLETE        PROCEDURE DATE:  2019      INTERPRETATION:  REPORT:    TRANSTHORACIC ECHOCARDIOGRAM REPORT         Patient Name:   TOMY ESPINOSA Accession #: [id number]  Medical Rec #:  [id number]        Height:      63.0in 160.0 cm  YOB: 1940       Weight:      114.0 lb 51.71 kg  Patient Age:    78 years        BSA:         1.52 m²  Patient Gender: M               BP:          115/57 mmHg       Date of Exam:        2019 2:31:27 PM  Referring Physician: [id number] ALVAREZ  Sonographer:         Dianne Calvo  Reading Physician:   Alee Rubi MD.    Procedure:     2D Echo/Doppler/Color Doppler Complete.  Indications:   I50.9 - Heart Failure, unspecified  Diagnosis:     Heart failure, unspecified - I50.9  Study Details: Technically good study.         Summary:   1. Left ventricular ejection fraction, by visual estimation, is 70 to   75%.   2. Normal left ventricular size and wall thicknesses, with normal   systolic and diastolicfunction.   3. Estimated pulmonary artery systolic pressure is 43.0 mmHg assuming a   right atrial pressure of 3 mmHg, which is consistent with mild pulmonary   hypertension.    PHYSICIAN INTERPRETATION:  Left Ventricle: Normal left ventricular size and wall thicknesses, with   normal systolic and diastolic function. Left ventricular ejection   fraction, by visual estimation, is 70 to 75%.       LV Wall Scoring:  All segments are normal.    Right Ventricle: Normal right ventricular size and function.  Left Atrium: Normal left atrial size.  Right Atrium: Normal right atrial size.  Pericardium: There is no evidence of pericardial effusion.  Mitral Valve: Structurally normal mitral valve, with normal leaflet   excursion. The mitral valve is normal in structure. No evidence of mitral   valve regurgitation is seen.  Tricuspid Valve: Structurally normal tricuspid valve, with normal leaflet   excursion. The tricuspid valve is normal in structure. Trivial tricuspid   regurgitation is visualized. Estimated pulmonary artery systolic pressure   is 43.0 mmHg assuming a right atrial pressure of 3 mmHg, which is   consistent with mild pulmonary hypertension.  Aortic Valve: Normal trileaflet aortic valve with normal opening. The   aortic valve is normal. No evidence of aortic valve regurgitation is seen.  Pulmonic Valve: Structurally normal pulmonic valve, with normal leaflet   excursion. The pulmonic valve is normal. No indication of pulmonic valve   regurgitation.  Aorta: The aortic root and ascending aorta are structurally normal, with   no evidence of dilitation.  Pulmonary Artery: The main pulmonary artery is normal in size.  Venous: The inferior vena cava was normal sized, with respiratory size   variation greater than 50%.       2D AND M-MODE MEASUREMENTS (normal ranges within parentheses):  Left Ventricle:                  Normal   Aorta/Left Atrium:               Normal  IVSd (2D):              1.08 cm (0.7-1.1) AoV Cusp Separation: 1.56 cm   (1.5-2.6)  LVPWd (2D):             1.00 cm (0.7-1.1) Left Atrium (Mmode): 2.65 cm   (1.9-4.0)  LVIDd (2D):             4.39 cm (3.4-5.7) Right Ventricle:  LVIDs (2D):             2.01 cm           RVd (2D):        2.27 cm  LV FS (2D):             54.3 %   (>25%)   TAPSE:           2.50 cm  Relative Wall Thickness  0.46    (<0.42)    SPECTRAL DOPPLER ANALYSIS:  LV DIASTOLIC FUNCTION:  MV Peak E: 0.83 m/s Decel Time: 230 msec  MV Peak A: 0.68 m/s  E/A Ratio: 1.22    LVOT Diam: 1.79 cm    Mitral Valve:  MV P1/2 Time: 66.58 msec  MV Area, PHT: 3.30 cm²    Tricuspid Valve and PA/RV Systolic Pressure: TR Max Velocity: 3.16 m/s RA   Pressure: 3 mmHg RVSP/PASP: 43.0 mmHg    Pulmonic Valve:  PV Max Velocity: 0.86 m/s PV Max PG: 3.0 mmHg PV Mean PG:       [id number] Alee Rubi MD, Electronically signed on 2019 at 5:42:10   PM              *** Final ***                    ALEE RUBI MD  This document has been electronically signed. Aug 27 2019  2:31PM             (19 @ 14:31)  12 Lead ECG:   Ventricular Rate 69 BPM    Atrial Rate 69 BPM    P-R Interval 178 ms    QRS Duration 92 ms    Q-T Interval 436 ms    QTC Calculation(Bezet) 467 ms    P Axis 73 degrees    R Axis -70 degrees    T Axis 75 degrees    Diagnosis Line Normal sinus rhythm  Left axis deviation  Abnormal ECG    Confirmed by Michelle Zamora MD ([id number]) on 2019 2:53:57 PM (19 @ 14:27)      MEDICATIONS  abacavir 600 mG/dolutegravir 50 mG/lamiVUDine 300 mG 1  carbidopa/levodopa  25/100 1  chlorhexidine 4% Liquid 1  ciprofloxacin     Tablet 500  docusate sodium 100  enoxaparin Injectable 40  pantoprazole    Tablet 40  senna 2  sodium chloride 0.9%. 1000      ANTIBIOTICS:  abacavir 600 mG/dolutegravir 50 mG/lamiVUDine 300 mG 1 Tablet(s) Oral daily  ciprofloxacin     Tablet 500 milliGRAM(s) Oral every 12 hours      All available historical records have been reviewed

## 2019-08-30 NOTE — DISCHARGE NOTE PROVIDER - NSDCCPCAREPLAN_GEN_ALL_CORE_FT
PRINCIPAL DISCHARGE DIAGNOSIS  Diagnosis: Encounter for rehabilitation  Assessment and Plan of Treatment: All the workup for your syncope were negative. Please continue taking the medication fludrocortisone for your blood pressure and follow up with your docotors.      SECONDARY DISCHARGE DIAGNOSES  Diagnosis: Syncope  Assessment and Plan of Treatment: All the workup for your syncope were negative. Please move slowly when transitining from sitting  or laying to standing. Please keep well hydrated.    Diagnosis: UTI (urinary tract infection)  Assessment and Plan of Treatment: Take one more dose of the antibiotic.    Diagnosis: Metatarsal fracture  Assessment and Plan of Treatment:     Diagnosis: Fall  Assessment and Plan of Treatment: PRINCIPAL DISCHARGE DIAGNOSIS  Diagnosis: Encounter for rehabilitation  Assessment and Plan of Treatment: All the workup for your syncope were negative. Please continue taking the medication fludrocortisone for your blood pressure and follow up with your docotors.      SECONDARY DISCHARGE DIAGNOSES  Diagnosis: Syncope  Assessment and Plan of Treatment: All the workup for your syncope were negative. Please move slowly when transitining from sitting  or laying to standing. Please keep well hydrated.    Diagnosis: UTI (urinary tract infection)  Assessment and Plan of Treatment:     Diagnosis: Metatarsal fracture  Assessment and Plan of Treatment:     Diagnosis: Fall  Assessment and Plan of Treatment:

## 2019-08-30 NOTE — DISCHARGE NOTE PROVIDER - NSDCFUADDINST_GEN_ALL_CORE_FT
- Please move slowly when transitioning from sitting or laying to standing  - wear the mele stockings during the day only - Please move slowly when transitioning from sitting or laying to standing  - wear the mele stockings during the day only  -your R knee xray results aren't back yet. Your doctor should be able to pull up the results in clinic

## 2019-08-30 NOTE — PROGRESS NOTE ADULT - SUBJECTIVE AND OBJECTIVE BOX
In Emergency Room, orthostasis was found.  Consider treating his orthostatic hypotension to reduce his risk of falls.  He has right knee pain.  He has some pain on end flexion.  I'll order a right knee Xray.  He notices some pain on weight bearing.  He did amb. 75 ft with a walker.  PT and I advised a short inpatient rehab admit.  He refuses and report PT hasn't been so helpful in the past.  He wishes to go home with home PT when stable.  Again I believe it is important to treat his orthostasis.  Parkisnon's disease is of coarse associated with autonomic neuroapthy and orthostasis.

## 2019-08-30 NOTE — PROGRESS NOTE ADULT - SUBJECTIVE AND OBJECTIVE BOX
Neurology Follow up note      Name  TOMY ESPINOSA    HPI:  77y/o M with PMH of HIV on HAART (last CD4 400s), ?Parkinsons, hx of depression, hx of cervical disc disease s/p laminectomy (2018) presents with a syncopal episode. Occurred night before admit - pt was going to get a sandwich and then suddenly blacked out at home. Pt lives alone, he had loss of conciousness for unknown period. Denies any preceding CP, palpitations, flushing, dizziness, lightheadedness. When he woke up he didn't check the time and had difficulty standing up due to pain in R leg which was flexed at knee underneath him and back of head. He was bleeding from the posterior scalp but denied being confused, no incontinence or tongue biting. He was able to get up eventually but did not come to hospital due to transport issues. The morning of admit he had residual lightheadedness and some nausea without vomiting, so came to ED. Denied having fever, chills, SOB, CP, palpitations, abd pain, incontinence of bowel or bladder, numbness/tingling, focal weakness. Endorses for 2mo having inc difficulty urinating and some burning when he does. Baseline constipation.    In ED: patient was a trauma code. T99, HR55, /71, RR16, SpO2 98% on ambient air. Pan-CT scans showed prior known cervical disc disease, R lower lobe 'hazy' opacities, with fx of 5th metatarsal of R 5th toe. No PE was found. D-dimer was 2200s, BNP 500s, UA grossly positive with hematuria, lactate elevate at 3.8. Labs remarkable for Cr 1.5 (baseline ~1-1.2) but otherwise WNL. Orthostatics were positive in ED, 160/72 lying to 133/65 standing. 2L IVF given and pt started on ceftriaxone. Patient admitted for further workup.  Of note pt a poor historian for his meds - doesn't know doses/names. Confirmed his prescriptions with his [facility] but unclear if he is taking these appropriately. (26 Aug 2019 17:40)      Interval History - Pt currently on Veeg> No known siezures so far overnight. Pt appears mildly agitated. Some leads appear disconnected.          Vital Signs Last 24 Hrs  T(C): 36.5 (29 Aug 2019 20:42), Max: 36.8 (29 Aug 2019 13:19)  T(F): 97.7 (29 Aug 2019 20:42), Max: 98.3 (29 Aug 2019 13:19)  HR: 46 (29 Aug 2019 20:42) (46 - 47)  BP: 125/59 (29 Aug 2019 20:42) (125/59 - 141/71)  BP(mean): --  RR: 18 (29 Aug 2019 13:19) (18 - 18)  SpO2: 98% (30 Aug 2019 00:26) (98% - 98%)          Neurological Exam:   Mental status: Awake, alert and oriented x3.   PERRLA EOMI  Speech intact  THOMAS X 4  SENSORY: GROSSLY intact        Medications  abacavir 600 mG/dolutegravir 50 mG/lamiVUDine 300 mG 1 Tablet(s) Oral daily  acetaminophen   Tablet .. 650 milliGRAM(s) Oral every 6 hours PRN  carbidopa/levodopa  25/100 1 Tablet(s) Oral every 8 hours  chlorhexidine 4% Liquid 1 Application(s) Topical <User Schedule>  ciprofloxacin     Tablet 500 milliGRAM(s) Oral every 12 hours  docusate sodium 100 milliGRAM(s) Oral three times a day  enoxaparin Injectable 40 milliGRAM(s) SubCutaneous daily  meclizine 12.5 milliGRAM(s) Oral three times a day PRN  ondansetron Injectable 4 milliGRAM(s) IV Push three times a day PRN  pantoprazole    Tablet 40 milliGRAM(s) Oral before breakfast  senna 2 Tablet(s) Oral at bedtime  sodium chloride 0.9%. 1000 milliLiter(s) IV Continuous <Continuous>      Lab      Radiology

## 2019-08-30 NOTE — DISCHARGE NOTE PROVIDER - HOSPITAL COURSE
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 Pt was started on fludrocortisone 0.1qd. Pt was recommended for 4a rehab, which he refused. Per PMR, rec outpt R knee xray and knee brace. Pt had a positive urinalysis, finished a course of antibiotics. Neurosx rec MRI c spine NC, pt refused, will do outpt. Pt found to have small tracheal diverticulum, with  opacities in the trachea and central bronchi compatible with secretions, emphysema is present, no overt aspirations per speech and swallow eval. Pt had a R 5th metatarsal fx & head lac, stable, refused 4a rehab.     For his cervical disc disease s/p laminectomy and fusions, done by Dr. Goins in 2018, C6-7 mod-severe spinal stenosis noted on scans, neurosx rec cervical neck MR NC (to be done outpt). RLL lung nodule- 4mm nodule, f/u CT scan outpt 1yr. 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 Pt was started on fludrocortisone 0.1qd. Pt was recommended for 4a rehab, which he refused. Per PMR, rec outpt R knee xray and knee brace. Pt had a positive urinalysis, finished a course of antibiotics. Neurosx rec MRI c spine NC, pt refused, will do outpt. Pt found to have small tracheal diverticulum, with  opacities in the trachea and central bronchi compatible with secretions, emphysema is present, no overt aspirations per speech and swallow eval. Pt had a R 5th metatarsal fx & head lac, stable, refused 4a rehab.     For his cervical disc disease s/p laminectomy and fusions, done by Dr. Goins in 2018, C6-7 mod-severe spinal stenosis noted on scans, neurosx rec cervical neck MR NC (to be done outpt). RLL lung nodule- 4mm nodule, f/u CT scan outpt 1yr.

## 2019-08-30 NOTE — DISCHARGE NOTE PROVIDER - CARE PROVIDER_API CALL
Tod Wolf (DO)  Infectious Disease; Internal Medicine  [address] [place] [address]  Phone: [phone]  Fax: [phone]  Follow Up Time: 1 week    Paul Huertas)  Neurology  [address] [place] [address], [address]  [place] [address]  Phone: [phone]  Fax: [phone]  Follow Up Time: 1 week    Jermaine Arvizu)  Surgical Physicians  [address] [place] [address], [address]  [place] [address]  Phone: [phone]  Fax: [phone]  Follow Up Time: 2 weeks Paul Huertas)  Neurology  [address] [place] [address], [address]  [place] [address]  Phone: [phone]  Fax: [phone]  Follow Up Time: 1 week    Jermaine Arvizu)  Surgical Physicians  [address] [place] [address], [address]  [place] [address]  Phone: [phone]  Fax: [phone]  Follow Up Time: 1 week    Brent Gaviria)  [facility]  [address] [place] [address]  Phone: [phone]  Fax: [phone]  Follow Up Time: 1-3 days

## 2019-08-30 NOTE — DISCHARGE NOTE PROVIDER - NSDCFUSCHEDAPPT_GEN_ALL_CORE_FT
TOMY ESPINOSA ; 09/09/2019 ; [facility] [address] TOMY ESPINOSA ; 09/09/2019 ; [facility] [address] TOMY ESPINOSA ; 09/09/2019 ; [facility] [address] TOMY ESPINOSA ; 09/09/2019 ; [facility] [address]

## 2019-08-30 NOTE — DISCHARGE NOTE PROVIDER - CARE PROVIDERS DIRECT ADDRESSES
,DirectAddress_Unknown,[email or web address],DirectAddress_Unknown ,[email or web address],DirectAddress_Unknown,DirectAddress_Unknown

## 2019-08-30 NOTE — PROGRESS NOTE ADULT - PROVIDER SPECIALTY LIST ADULT
Infectious Disease
Internal Medicine
Neurology
Neurology
Neurosurgery
Physiatry
Infectious Disease

## 2019-08-30 NOTE — PROGRESS NOTE ADULT - SUBJECTIVE AND OBJECTIVE BOX
Epilepsy Attending Note:     TOMY ESPINOSA    78y Male  MRN [id number]    Vital Signs Last 24 Hrs  T(C): 36.2 (30 Aug 2019 04:50), Max: 36.8 (29 Aug 2019 13:19)  T(F): 97.1 (30 Aug 2019 04:50), Max: 98.3 (29 Aug 2019 13:19)  HR: 43 (30 Aug 2019 04:50) (43 - 47)  BP: 134/62 (30 Aug 2019 04:50) (125/59 - 141/71)  BP(mean): --  RR: 18 (29 Aug 2019 13:19) (18 - 18)  SpO2: 98% (30 Aug 2019 00:26) (98% - 98%)                          11.9   3.66  )-----------( 141      ( 30 Aug 2019 05:37 )             35.5       08-30    145  |  114<H>  |  14  ----------------------------<  90  3.7   |  21  |  1.2    Ca    8.2<L>      30 Aug 2019 05:37  Mg     1.8     08-29        MEDICATIONS  (STANDING):  abacavir 600 mG/dolutegravir 50 mG/lamiVUDine 300 mG 1 Tablet(s) Oral daily  carbidopa/levodopa  25/100 1 Tablet(s) Oral every 8 hours  chlorhexidine 4% Liquid 1 Application(s) Topical <User Schedule>  ciprofloxacin     Tablet 500 milliGRAM(s) Oral every 12 hours  docusate sodium 100 milliGRAM(s) Oral three times a day  enoxaparin Injectable 40 milliGRAM(s) SubCutaneous daily  pantoprazole    Tablet 40 milliGRAM(s) Oral before breakfast  senna 2 Tablet(s) Oral at bedtime  sodium chloride 0.9%. 1000 milliLiter(s) (75 mL/Hr) IV Continuous <Continuous>    MEDICATIONS  (PRN):  acetaminophen   Tablet .. 650 milliGRAM(s) Oral every 6 hours PRN Mild Pain (1 - 3)  meclizine 12.5 milliGRAM(s) Oral three times a day PRN Dizziness  ondansetron Injectable 4 milliGRAM(s) IV Push three times a day PRN Nausea and/or Vomiting            VEEG in the last 24 hours:    Background-------8-9hz    Focal and generalized slowing------none    Interictal activity-----none    Events---none    Seizures---none    Impression: normal A/D/S V-EEG X 24 hours    Plan - Discussed with the neurology

## 2019-08-30 NOTE — PROGRESS NOTE ADULT - ASSESSMENT
IMP:mpression:  Pt here s/p syncopal episode 2 days ago. Does not recall entire episode. Follows with neurology in [facility] for Parkinson's. Also w/ HIV, follows w/ ID. Was taking Sinemet 25/100 3 tabs TID beginning in June. Course was interrupted at some point and he saw another neurologist who he states taped Sinemet down to 25/100 mg t tab TID which is his current dose. He is also taking some jalil of prescription purchased on the internet from [facility] for Parkinson's sx, we are not sure what medication this is as of now. Daughter is going home to get all meds and supplements. Orthostatic hypotension due to Sinemet and unknown medication from [facility].    Plan:  For now, Sinemet 25/100 TID  MRI brain without and with gladys No evidence of infarct noted   Continue Maintance dose AEDS IMP:mpression:  Pt here s/p syncopal episode 2 days ago. Does not recall entire episode. Follows with neurology in [facility] for Parkinson's. Also w/ HIV, follows w/ ID. Was taking Sinemet 25/100 3 tabs TID beginning in June. Course was interrupted at some point and he saw another neurologist who he states taped Sinemet down to 25/100 mg t tab TID which is his current dose. He is also taking some jalil of prescription purchased on the internet from [facility] for Parkinson's sx, we are not sure what medication this is as of now. Daughter is going home to get all meds and supplements. Orthostatic hypotension due to Sinemet and unknown medication from [facility].    Plan:  For now, Sinemet 25/100 TID  MRI brain without and with gladys No evidence of infarct noted   Continue Maintance dose   FOllow up Veeg read

## 2019-09-04 DIAGNOSIS — T42.8X5A ADVERSE EFFECT OF ANTIPARKINSONISM DRUGS AND OTHER CENTRAL MUSCLE-TONE DEPRESSANTS, INITIAL ENCOUNTER: ICD-10-CM

## 2019-09-04 DIAGNOSIS — R11.0 NAUSEA: ICD-10-CM

## 2019-09-04 DIAGNOSIS — K59.00 CONSTIPATION, UNSPECIFIED: ICD-10-CM

## 2019-09-04 DIAGNOSIS — R94.31 ABNORMAL ELECTROCARDIOGRAM [ECG] [EKG]: ICD-10-CM

## 2019-09-04 DIAGNOSIS — G89.29 OTHER CHRONIC PAIN: ICD-10-CM

## 2019-09-04 DIAGNOSIS — N30.00 ACUTE CYSTITIS WITHOUT HEMATURIA: ICD-10-CM

## 2019-09-04 DIAGNOSIS — R91.1 SOLITARY PULMONARY NODULE: ICD-10-CM

## 2019-09-04 DIAGNOSIS — M48.02 SPINAL STENOSIS, CERVICAL REGION: ICD-10-CM

## 2019-09-04 DIAGNOSIS — Z98.1 ARTHRODESIS STATUS: ICD-10-CM

## 2019-09-04 DIAGNOSIS — J43.9 EMPHYSEMA, UNSPECIFIED: ICD-10-CM

## 2019-09-04 DIAGNOSIS — I82.4Z1 ACUTE EMBOLISM AND THROMBOSIS OF UNSPECIFIED DEEP VEINS OF RIGHT DISTAL LOWER EXTREMITY: ICD-10-CM

## 2019-09-04 DIAGNOSIS — J39.8 OTHER SPECIFIED DISEASES OF UPPER RESPIRATORY TRACT: ICD-10-CM

## 2019-09-04 DIAGNOSIS — R33.9 RETENTION OF URINE, UNSPECIFIED: ICD-10-CM

## 2019-09-04 DIAGNOSIS — J98.11 ATELECTASIS: ICD-10-CM

## 2019-09-04 DIAGNOSIS — S92.351A DISPLACED FRACTURE OF FIFTH METATARSAL BONE, RIGHT FOOT, INITIAL ENCOUNTER FOR CLOSED FRACTURE: ICD-10-CM

## 2019-09-04 DIAGNOSIS — Z87.891 PERSONAL HISTORY OF NICOTINE DEPENDENCE: ICD-10-CM

## 2019-09-04 DIAGNOSIS — N17.9 ACUTE KIDNEY FAILURE, UNSPECIFIED: ICD-10-CM

## 2019-09-04 DIAGNOSIS — Z96.611 PRESENCE OF RIGHT ARTIFICIAL SHOULDER JOINT: ICD-10-CM

## 2019-09-04 DIAGNOSIS — S01.01XA LACERATION WITHOUT FOREIGN BODY OF SCALP, INITIAL ENCOUNTER: ICD-10-CM

## 2019-09-04 DIAGNOSIS — E87.2 ACIDOSIS: ICD-10-CM

## 2019-09-04 DIAGNOSIS — G20 PARKINSON'S DISEASE: ICD-10-CM

## 2019-09-04 DIAGNOSIS — R79.89 OTHER SPECIFIED ABNORMAL FINDINGS OF BLOOD CHEMISTRY: ICD-10-CM

## 2019-09-04 DIAGNOSIS — Z21 ASYMPTOMATIC HUMAN IMMUNODEFICIENCY VIRUS [HIV] INFECTION STATUS: ICD-10-CM

## 2019-09-04 DIAGNOSIS — N18.2 CHRONIC KIDNEY DISEASE, STAGE 2 (MILD): ICD-10-CM

## 2019-09-04 DIAGNOSIS — D69.59 OTHER SECONDARY THROMBOCYTOPENIA: ICD-10-CM

## 2019-09-04 DIAGNOSIS — E86.0 DEHYDRATION: ICD-10-CM

## 2019-09-04 DIAGNOSIS — R00.1 BRADYCARDIA, UNSPECIFIED: ICD-10-CM

## 2019-09-04 DIAGNOSIS — K21.9 GASTRO-ESOPHAGEAL REFLUX DISEASE WITHOUT ESOPHAGITIS: ICD-10-CM

## 2019-09-04 DIAGNOSIS — R55 SYNCOPE AND COLLAPSE: ICD-10-CM

## 2019-09-04 DIAGNOSIS — I95.1 ORTHOSTATIC HYPOTENSION: ICD-10-CM

## 2019-09-04 DIAGNOSIS — T36.95XA ADVERSE EFFECT OF UNSPECIFIED SYSTEMIC ANTIBIOTIC, INITIAL ENCOUNTER: ICD-10-CM

## 2019-09-09 ENCOUNTER — APPOINTMENT (OUTPATIENT)
Dept: NEUROSURGERY | Facility: CLINIC | Age: 79
End: 2019-09-09

## 2021-04-30 ENCOUNTER — APPOINTMENT (OUTPATIENT)
Dept: NEUROLOGY | Facility: CLINIC | Age: 81
End: 2021-04-30

## 2021-04-30 ENCOUNTER — APPOINTMENT (OUTPATIENT)
Dept: NEUROSURGERY | Facility: CLINIC | Age: 81
End: 2021-04-30

## 2021-05-26 ENCOUNTER — APPOINTMENT (OUTPATIENT)
Dept: NEUROSURGERY | Facility: CLINIC | Age: 81
End: 2021-05-26

## 2021-06-30 ENCOUNTER — APPOINTMENT (OUTPATIENT)
Dept: NEUROSURGERY | Facility: CLINIC | Age: 81
End: 2021-06-30
Payer: MEDICARE

## 2021-06-30 VITALS
SYSTOLIC BLOOD PRESSURE: 117 MMHG | TEMPERATURE: 97.5 F | HEIGHT: 63 IN | HEART RATE: 60 BPM | BODY MASS INDEX: 22.15 KG/M2 | DIASTOLIC BLOOD PRESSURE: 71 MMHG | WEIGHT: 125 LBS

## 2021-06-30 DIAGNOSIS — M47.12 OTHER SPONDYLOSIS WITH MYELOPATHY, CERVICAL REGION: ICD-10-CM

## 2021-06-30 PROCEDURE — 99215 OFFICE O/P EST HI 40 MIN: CPT

## 2021-06-30 NOTE — HISTORY OF PRESENT ILLNESS
[de-identified] : 6/30/2021: 79 yo M pmhx of lumbar radiculopathy, HIV, presents as referral from Dr. Arvizu for cervical radiculopathy management. Per her chart notes from 2019, he visited Dr. Arvizu due to complaints of severe stiffness and spasm in b/l LE that radiates down to b/l hamstrings. Imaging of MRI c spine at that time showed worsening stenosis C6-C7. He had a posterior cervical fusion from C3-C6 with Dr. Arvizu three years ago and now complains of worsening gait instability that began immediately since his previous surgery. He has tried epidural spinal injections, physical therapy, as well as acupuncture and has failed to relieve the gait instability. He recently got an MRI c spine in 3/2021 which showed: severe neuroforaminal narrowing due to unconvertebral hypertrophy and hypertrophic facet joint change at level of C4-C5 and C5-C6. He denies changes in sensation of his extremities, weakness in extremities, urinary/bowel incontinence. \par \par \par 3/2019: Follow up note from Dr. Arvizu: "Mr. Pompa is here with new complaints of severe stiffness and spasm in the BLE with pain radiating into the bilateral hamstrings. 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 Repeat C spine MRI shows worsened stenosis at the C6-7 adjacent level. I do not believe this to be the source of his dizziness. 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 Recommended referral to pain management for epidural injections & further decompression of c-spine."

## 2021-06-30 NOTE — HISTORY OF PRESENT ILLNESS
[de-identified] : 6/30/2021: 79 yo M pmhx of lumbar radiculopathy, HIV, presents as referral from Dr. Arvizu for cervical radiculopathy management. Per her chart notes from 2019, he visited Dr. Arvizu due to complaints of severe stiffness and spasm in b/l LE that radiates down to b/l hamstrings. Imaging of MRI c spine at that time showed worsening stenosis C6-C7. He had a posterior cervical fusion from C3-C6 with Dr. Arvizu three years ago and now complains of worsening gait instability that began immediately since his previous surgery. He has tried epidural spinal injections, physical therapy, as well as acupuncture and has failed to relieve the gait instability. He recently got an MRI c spine in 3/2021 which showed: severe neuroforaminal narrowing due to unconvertebral hypertrophy and hypertrophic facet joint change at level of C4-C5 and C5-C6. He denies changes in sensation of his extremities, weakness in extremities, urinary/bowel incontinence. \par \par \par 3/2019: Follow up note from Dr. Arvizu: "Mr. Pompa is here with new complaints of severe stiffness and spasm in the BLE with pain radiating into the bilateral hamstrings. 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 Repeat C spine MRI shows worsened stenosis at the C6-7 adjacent level. I do not believe this to be the source of his dizziness. 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 Recommended referral to pain management for epidural injections & further decompression of c-spine."

## 2021-06-30 NOTE — ASSESSMENT
[FreeTextEntry1] : Assessment: 81 yo M pmhx of lumbar radiculopathy, HIV, presents as referral from Dr. Arvizu for cervical radiculopathy management\par \par \par \par Plan:\par I have discussed the natural history and treatment options for cervical myelopathy with the patient. I explained the indications for observation, conservative management, medical management, physical therapy, pain management approaches and surgery. I explained the different types and surgical approaches including anterior and posterior approaches as well as decompression only procedures and instrumented fusions. I discussed the risks, benefits, possible complications and expected outcome related to each treatment option. The risks of surgery were discussed in detail including but not limited to postoperative infection at the surgical site, hospital acquired pneumonia, hospital acquired urinary tract infection, postoperative meningitis, wound dehiscence, CSF leak, stroke (ischemic and hemorrhagic), postoperative seizures, worsening motor function due to spinal cord or nerve injury, postoperative visual deficit which could be permanent (blindness) when surgery is performed in a prone position, cardiovascular complications (MI, PE, DVT) and I also explained that some of these complications could lead to sepsis, coma or even death. I discussed the fact that some of these complications may require subsequent surgical procedure(s) to correct them.\par \par In the end, I recommend\par \par The patient understands the plan of care and is in agreement.  All questions answered to patient satisfaction.\par

## 2021-06-30 NOTE — DATA REVIEWED
[de-identified] : 3/2021 MRI C-spine non contrast: Severe bilateral neuroforaminal narrowing due to uncovertebral hypertrophy and hypertrophic facet joint change at level of C4-C5 and C5-C6

## 2021-06-30 NOTE — END OF VISIT
[FreeTextEntry3] : I have seen the patient and reviewed the case together with PA and I agree with the final recommendations and plan of care.\par \par Jey Tracey MD\par Neurosurgery\par \par  [Time Spent: ___ minutes] : I have spent [unfilled] minutes of time on the encounter. [>50% of the face to face encounter time was spent on counseling and/or coordination of care for ___] : Greater than 50% of the face to face encounter time was spent on counseling and/or coordination of care for [unfilled]

## 2021-06-30 NOTE — DATA REVIEWED
[de-identified] : 3/2021 MRI C-spine non contrast: Severe bilateral neuroforaminal narrowing due to uncovertebral hypertrophy and hypertrophic facet joint change at level of C4-C5 and C5-C6

## 2021-06-30 NOTE — ASSESSMENT
[FreeTextEntry1] : Assessment: 79 yo M pmhx of lumbar radiculopathy, HIV, presents as referral from Dr. Arvizu for cervical radiculopathy management\par \par \par \par Plan:\par I have discussed the natural history and treatment options for cervical myelopathy with the patient. I explained the indications for observation, conservative management, medical management, physical therapy, pain management approaches and surgery. I explained the different types and surgical approaches including anterior and posterior approaches as well as decompression only procedures and instrumented fusions. I discussed the risks, benefits, possible complications and expected outcome related to each treatment option. The risks of surgery were discussed in detail including but not limited to postoperative infection at the surgical site, hospital acquired pneumonia, hospital acquired urinary tract infection, postoperative meningitis, wound dehiscence, CSF leak, stroke (ischemic and hemorrhagic), postoperative seizures, worsening motor function due to spinal cord or nerve injury, postoperative visual deficit which could be permanent (blindness) when surgery is performed in a prone position, cardiovascular complications (MI, PE, DVT) and I also explained that some of these complications could lead to sepsis, coma or even death. I discussed the fact that some of these complications may require subsequent surgical procedure(s) to correct them.\par \par In the end, I recommend\par \par The patient understands the plan of care and is in agreement.  All questions answered to patient satisfaction.\par

## 2021-06-30 NOTE — PHYSICAL EXAM
[Motor Strength] : muscle strength was normal in all four extremities [Balance] : balance was intact [Abnormal Walk] : normal gait [General Appearance - Alert] : alert [General Appearance - In No Acute Distress] : in no acute distress [Oriented To Time, Place, And Person] : oriented to person, place, and time [Impaired Insight] : insight and judgment were intact [Affect] : the affect was normal [Person] : oriented to person [Place] : oriented to place [Time] : oriented to time [Short Term Intact] : short term memory intact [Remote Intact] : remote memory intact [Span Intact] : the attention span was normal [Concentration Intact] : normal concentrating ability [Fluency] : fluency intact [Comprehension] : comprehension intact [Current Events] : adequate knowledge of current events [Past History] : adequate knowledge of personal past history [Vocabulary] : adequate range of vocabulary [Cranial Nerves Optic (II)] : visual acuity intact bilaterally,  pupils equal round and reactive to light [Cranial Nerves Oculomotor (III)] : extraocular motion intact [Cranial Nerves Trigeminal (V)] : facial sensation intact symmetrically [Cranial Nerves Facial (VII)] : face symmetrical [Cranial Nerves Vestibulocochlear (VIII)] : hearing was intact bilaterally [Cranial Nerves Glossopharyngeal (IX)] : tongue and palate midline [Cranial Nerves Accessory (XI - Cranial And Spinal)] : head turning and shoulder shrug symmetric [Cranial Nerves Hypoglossal (XII)] : there was no tongue deviation with protrusion [Motor Tone] : muscle tone was normal in all four extremities [No Muscle Atrophy] : normal bulk in all four extremities [Sensation Tactile Decrease] : light touch was intact [2+] : Patella left 2+ [FreeTextEntry6] : his RUE extremity is 4+/5 but baseline from Rt shoulder surgery\par all other extremities is 5/5 [FreeTextEntry8] : wide, unsteady gait  [Past-pointing] : there was no past-pointing [Tremor] : no tremor present

## 2021-06-30 NOTE — REASON FOR VISIT
[New Patient Visit] : a new patient visit [Referred By: _________] : Patient was referred by ANGELIA [FreeTextEntry1] : 6/30/2021: 81 yo M pmhx of lumbar radiculopathy, HIV, presents as referral from Dr. Arvizu for cervical radiculopathy management. Per her chart notes from 2019, he visited Dr. Arvizu due to complaints of severe stiffness and spasm in b/l LE that radiates down to b/l hamstrings. Imaging of MRI c spine at that time showed worsening stenosis C6-C7. She recommended he see pain management for epidural spinal injections. He was also given muscle relaxers for the pain. He recently got an MRI c spine in 3/2021 which showed: severe neuroforaminal narrowing due to unconvertebral hypertrophy and hypertrophic facet joint change at level of C4-C5 and C5-C6. He comes to our clinic due to ***\par \par \par 3/2019: Follow up note from Dr. Arvizu: "Mr. Pompa is here with new complaints of severe stiffness and spasm in the BLE with pain radiating into the bilateral hamstrings. 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 Repeat C spine MRI shows worsened stenosis at the C6-7 adjacent level. I do not believe this to be the source of his dizziness. 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 Recommended referral to pain management for epidural injections & further decompression of c-spine."

## 2021-08-24 NOTE — ASU PATIENT PROFILE, ADULT - VISION (WITH CORRECTIVE LENSES IF THE PATIENT USUALLY WEARS THEM):
Reason for visit: gross hematuria      Dx/Hx/Sx: gross hematuria     Records/imaging/labs/orders: UA/UC 8/16 in epic     At Rooming: video visit    Normal vision: sees adequately in most situations; can see medication labels, newsprint

## 2021-11-20 NOTE — ED ADULT TRIAGE NOTE - MEANS OF ARRIVAL
ambulatory
Pt presents to er with complaints of allergic reaction to Amoxcillin, states he was on it for intestinal infection, pt denies chest pain, sob, fevers at this time.

## 2022-02-03 NOTE — ASU PREOP CHECKLIST - VIA
Per ongoing email conversation with mom, regarding getting Rylee an appointment with a medication provider  Explained to mother that it could take up to 2 -3 months to get an appointment with Dr Pravin Stewart  Mom requested Dr Ken Tang, but Dr Ken Tang doesn't see patients under the age of 25  Mom was sent this information in my last email to her  ambulate

## 2022-03-02 NOTE — DISCHARGE NOTE PROVIDER - NSDCHOSPICE_GEN_A_CORE
"Covering for Dr. Nolan; received refill request for amlodipine but noted patient has not seen PCP in > 1 year.  Per chart review, Dr. Nolan noted, "Please call her, have not seen her since 2020.  She needs to schedule an office visit either with me or Tracie.  Once she schedules, I will refill her medication." in reference to refill on amlodipine and she was given a 30 day supply but per chart review doesn't have any appointment scheduled with Dr. Nolan or Tracie Menezes.  Rx refill denied, needs appointment with PCP or Tracie Barajas as indicated by Dr. Nolan previously.  Please notify patient.  "
Care Due:                  Date            Visit Type   Department     Provider  --------------------------------------------------------------------------------                                ESTABLISHED                              PATIENT -    [facility] INTERNAL  Last Visit: 09-      [facility]      MEDICINE       Natalia Nolan  Next Visit: None Scheduled  None         None Found                                                            Last  Test          Frequency    Reason                     Performed    Due Date  --------------------------------------------------------------------------------    Lipid Panel.  12 months..  atorvastatin.............  08- 07-    Powered by [employer]. Reference number: [id number].   3/02/2022 7:31:08 AM CST  
This Rx Request does not qualify for assessment with the OR.    Please review protocol details and the Care Due Message for extra clinical information    Reasons Rx Request may be deferred:  Labs/Vitals overdue  Labs/Vitals abnormal  Patient has been seen in the ED/Hospital since the last PCP visit  Medication is non-delegated  Provider is a non-participating provider   
No

## 2022-05-06 NOTE — PHYSICAL THERAPY INITIAL EVALUATION ADULT - THERAPY FREQUENCY, PT EVAL
Transition of Care Plan to SNF/Rehab    Communication to Patient/Family:  Met with patient and family and they are agreeable to the transition plan. The Plan for Transition of Care is related to the following treatment goals:     CM informed that pt will be d/c on today and will transition to snf: [facility] [address] and [phone] due to bed availability. CM spoke with pt's daughter: Dahiana Mendenhall and other family members, via telephone regarding todays d/c. All parties agreeable to change of facility and today d/c to snf on today. CM informed Dahiana Mendenhall of 2nd  Medicare Letter and FOC (verbal) placed in chart. Pt transport arranged today at 1PM     The Patient and/or patient representative was provided with a choice of provider and agrees  with the discharge plan. Yes [x] No []    A Freedom of choice list was provided with basic dialogue that supports the patient's individualized plan of care/goals and shares the quality data associated with the providers. Yes [x] No []    SNF/Rehab Transition:  Patient has been accepted to [facility] [address] and Rehab SNF/Rehab and meets criteria for admission. Patient will transported by [facility]  and expected to leave at 1P. Communication to SNF/Rehab:  Bedside RN, ONC Nurse , has been notified to update the transition plan to the facility and call report ([phone]). Discharge information has been updated on the AVS. And communicated to facility via [employer], or CC link. Discharge instructions to be fax'd to facility at [facility] #). [] BCPI-A  Patient has been identified as part of the  BCPI-A Program.  For Care Coordination associated with that Bundle Program, please contact   Bundle information has been communication to . Nursing Please include all hard scripts for controlled substances, med rec and dc summary, and AVS in packet.      Reviewed and confirmed with facility, [address] [place] [address], can manage the patient care needs
for the following:     Maxwell Goldsmith with (X) only those applicable:  Medication:  [x]Medications are available at the facility  [x]IV Antibiotics    []Controlled Substance - hard copies available sent. []Weekly Labs    Equipment:  []CPAP/BiPAP  []Wound Vacuum  []Trevino or Urinary Device  [x]PICC/Central Line  []Nebulizer  []Ventilator    Treatment:  []Isolation (for MRSA, VRE, etc.)  []Surgical Drain Management  []Tracheostomy Care  []Dressing Changes  []Dialysis with transportation  []PEG Care  []Oxygen  []Daily Weights for Heart Failure    Dietary:  []Any diet limitations  []Tube Feedings   []Total Parenteral Management (TPN)    Financial Resources:  []Medicaid Application Completed    []UAI Completed and copy given to pt/family  and copy given to pt/family  []A screening has previously been completed. []Level II Completed    [] Private pay individual who will not become   financially eligible for Medicaid within 6 months from admission to a [address] [place] [address] facility. [] Individual refused to have screening conducted. []Medicaid Application Completed    []The screening denied because it was determined individual did not need/did not qualify for nursing facility level of care. [] Out of state residents seeking direct admission to a [address] facility. [] Individuals who are inpatients of an out of state hospital, or in state or out of state veterans/ hospital and seek direct admission to a [address] facility  [] Individuals who are pateints or residents of a state owned/operated facility that is licensed by Department of [employer] [facility] and seek direct admission to [address] [place] [address]  [] A screening not required for enrollment in [facility] as set out in [address] [place] [address] 39-  [] [facility]) staff shall perform screenings of the [facility] clients. Advanced Care Plan:  []Surrogate Decision Maker of Care  []POA  []Communicated Code Status and copy sent.
Other:
n/a
3-5x/week

## 2022-05-27 NOTE — DISCHARGE NOTE ADULT - MEDICATION SUMMARY - MEDICATIONS TO TAKE
Sw received a voicemail from pt's spouse noting openness to any SNF close to home that can meet the pt's needs. Spouse specifically listed [facility],  [address], [facility] placed referrals via [address] [place] [address] to the listed locations . Cha will follow. I will START or STAY ON the medications listed below when I get home from the hospital:    acetaminophen 325 mg oral tablet  -- 2 tab(s) by mouth every 4 hours, As needed, For Temp greater than 38 C (100.4 F) or Mild Pain  -- Indication: For for pain    oxyCODONE-acetaminophen 5 mg-325 mg oral tablet  -- 1 tab(s) by mouth every 4 hours, As Needed -Moderate Pain (4 - 6) MDD:6  -- Indication: For for pain    Aspirin Enteric Coated 81 mg oral delayed release tablet  -- 1 tab(s) by mouth once a day , take to protect against heart attack   -- Swallow whole.  Do not crush.  Take with food or milk.    -- Indication: For for CAD    FLUoxetine 40 mg oral capsule  -- 1 cap(s) by mouth once a day  -- Indication: For for depression    efavirenz/emtricitabine/tenofovir disoproxil fumarate 600 mg-200 mg-300 mg oral tablet  -- 1 tab(s) by mouth once a day (at bedtime)  -- Indication: For for HIV    amantadine 100 mg oral capsule  -- 1 cap(s) by mouth once a day  -- Indication: For for Parkinson's    docusate sodium 100 mg oral capsule  -- 1 cap(s) by mouth 3 times a day  -- Indication: For for constipation    methocarbamol 500 mg oral tablet  -- 1 tab(s) by mouth 3 times a day  -- Indication: For for Muscle spasm    pantoprazole 40 mg oral delayed release tablet  -- 40 milligram(s) by mouth once a day in am before breakfast  -- Indication: For for GERD

## 2022-06-04 NOTE — ED ADULT TRIAGE NOTE - BP NONINVASIVE DIASTOLIC (MM HG)
[facility] End[facility]  Discharge Note  Short Stay    Procedure(s) (LRB):  COLONOSCOPY (N/A)    OUTCOME: Condition has improved and patient is now ready for discharge.    DISPOSITION: Home or Self Care    FINAL DIAGNOSIS:  <principal problem not specified>    FOLLOWUP: In clinic    DISCHARGE INSTRUCTIONS:    Discharge Procedure Orders   Diet Adult Regular     Notify your health care provider if you experience any of the following:  temperature >100.4     Notify your health care provider if you experience any of the following:  severe uncontrolled pain     Notify your health care provider if you experience any of the following:  difficulty breathing or increased cough     Notify your health care provider if you experience any of the following:  persistent dizziness, light-headedness, or visual disturbances     Activity as tolerated         Clinical Reference Documents Added to Patient Instructions       Document    COLONOSCOPY DISCHARGE INSTRUCTIONS (ENGLISH)    MODERATE AND DEEP SEDATION IN ADULTS (ENGLISH)          TIME SPENT ON DISCHARGE: 1 minutes  
69

## 2022-09-15 NOTE — ASU PREOP CHECKLIST - PATIENT SENT TO
endoscopy Cephalexin Pregnancy And Lactation Text: This medication is Pregnancy Category B and considered safe during pregnancy.  It is also excreted in breast milk but can be used safely for shorter doses.

## 2023-11-27 NOTE — DISCHARGE NOTE ADULT - PROVIDER TOKENS
Detail Level: Detailed Patient Specific Counseling (Will Not Stick From Patient To Patient): Triamcinolone ointment BID no more than 15 days in a month TOKEN:'[id number]',TOKEN:'[id number]'

## 2024-05-13 NOTE — ED ADULT NURSE NOTE - NS PRO AD NO ADVANCE DIRECTIVE
"Subjective:      Patient ID: Evan Castro is a 20 y.o. male.    Vitals:  height is 5' 10" (1.778 m) and weight is 61.2 kg (135 lb). His oral temperature is 98.5 °F (36.9 °C). His blood pressure is 112/72 and his pulse is 71. His respiration is 20 and oxygen saturation is 99%.     Chief Complaint: Hand Injury and Facial Injury    20 year old male c/o right hand pain and swelling that occurred yesterday.  He states that he was helping his dad with the engine transmission, and transmission slipped and hit his hand.   He also has 3 abrasions on his face that occurred yesterday.  He states he was trying to put medications on his dog, and the dog was fighting him.     20-year-old male presents to clinic with complaints of right-hand pain and swelling after injury by the transmission of a car on yesterday treating with ice and ibuprofen. Patient also reports abrasions to face by his dog on yesterday treating with topical medication. Denies increase redness, tenderness or drainage from initial scratches. Last tetanus vaccine 10/12/12     Hand Injury   His dominant hand is their right hand. The incident occurred 12 to 24 hours ago. The injury mechanism was a direct blow. The pain is present in the right hand. The quality of the pain is described as aching. The pain does not radiate. The pain is at a severity of 8/10. The pain is severe. The pain has been Constant since the incident. Pertinent negatives include no chest pain, muscle weakness, numbness or tingling. The symptoms are aggravated by movement and palpation. He has tried nothing for the symptoms.       Constitution: Negative for chills and fever.   Cardiovascular:  Negative for chest pain.   Musculoskeletal:  Positive for pain, trauma, joint pain, joint swelling, abnormal ROM of joint and muscle ache.   Skin:  Positive for wound and abrasion.   Neurological:  Negative for numbness and tingling.      Objective:     Physical Exam   Constitutional: He is oriented to " person, place, and time. He appears well-developed. He is cooperative. No distress.   HENT:   Head: Normocephalic and atraumatic.       Nose: Nose normal.   Mouth/Throat: Oropharynx is clear and moist and mucous membranes are normal.   Eyes: Lids are normal. Extraocular movement intact   Neck: Trachea normal and phonation normal. Neck supple.   Cardiovascular: Normal rate and normal pulses.   Pulmonary/Chest: Effort normal.   Abdominal: Normal appearance.   Musculoskeletal:      Right hand: He exhibits tenderness, bony tenderness, deformity and swelling. He exhibits normal range of motion, normal two-point discrimination, normal capillary refill and no laceration. Normal sensation noted. Decreased strength noted.   Neurological: He is alert and oriented to person, place, and time. He has normal strength and normal reflexes. No sensory deficit.   Skin: Skin is warm, dry, intact and not diaphoretic. Abrasions - head:  face        Psychiatric: His speech is normal and behavior is normal. Judgment and thought content normal.   Nursing note and vitals reviewed.      Assessment:     1. Closed displaced fracture of shaft of third metacarpal bone of right hand, initial encounter    2. Closed fracture of right hand, initial encounter    3. Hand injury, right, initial encounter    4. Right hand pain    5. Abrasion of face, initial encounter    6. Localized swelling on right hand    7. Comminuted fracture of bone        Plan:       Closed displaced fracture of shaft of third metacarpal bone of right hand, initial encounter    Closed fracture of right hand, initial encounter  -     SPLINT FOR HOME USE  -     ibuprofen (ADVIL,MOTRIN) 600 MG tablet; Take 1 tablet (600 mg total) by mouth 2 (two) times daily as needed for Pain.  Dispense: 10 tablet; Refill: 0  -     Ambulatory referral/consult to Orthopedics    Hand injury, right, initial encounter  -     XR HAND COMPLETE 3 VIEW RIGHT; Future; Expected date: 05/13/2024  -      SPLINT FOR HOME USE    Right hand pain  -     ibuprofen (ADVIL,MOTRIN) 600 MG tablet; Take 1 tablet (600 mg total) by mouth 2 (two) times daily as needed for Pain.  Dispense: 10 tablet; Refill: 0    Abrasion of face, initial encounter  -     mupirocin (BACTROBAN) 2 % ointment; Apply topically 3 (three) times daily as needed.  Dispense: 22 g; Refill: 0    Localized swelling on right hand    Comminuted fracture of bone      Appt. Scheduled with ortho 05/16/24, 4Less sent message to ortho for earlier appointment if available         XR HAND COMPLETE 3 VIEW RIGHT    Result Date: 5/13/2024  EXAMINATION: XR HAND COMPLETE 3 VIEW RIGHT CLINICAL HISTORY: Unspecified injury of right wrist, hand and finger(s), initial encounter TECHNIQUE: PA, lateral, and oblique views of the right hand were performed. COMPARISON: None FINDINGS: There is a comminuted fracture of the distal 3rd metacarpal, with slight volar angulation of the distal portion of the fracture.  There is a small thin butterfly fragment that appears positioned between the proximal and distal portions of the fracture.  The remainder of the hand appears unremarkable, and no additional fracture is seen.     As above.  This report was flagged in Epic as abnormal. Electronically signed by: Chrystal Enamorado Date:    05/13/2024 Time:    15:36        Reviewed abnormal xray with patient who acknowledged results. Discussed  Diagnosis and treatment plan with patient who verbalized understanding and agrees with plan of care.  Advised on the need to call and schedule a follow-up appointment with Orthopedics regarding fracture. Patient given educational handouts supporting this diagnosis as well.  Hand splint applied, patient tolerated well.  Patient denies any further questions or concerns at this time.  Patient exits exam room in no acute distress.     Patient Instructions   Wear the splint or brace you were given to support your hand. You may need to have surgery or get a  cast after the swelling goes down.  Prop your hand on pillows keeping it above the level of your heart. This may help lessen pain and swelling.  You may want to take medicines like ibuprofen or naproxen for swelling and pain. These are nonsteroidal anti-inflammatory drugs (NSAIDS). You might also have gotten a prescription for stronger pain medicines to take for a short time. If so, be sure to follow the instructions for taking them.  Ice may help you ease pain and swelling.  Place an ice pack or a bag of frozen vegetables wrapped in a towel over the painful part. Never put ice right on the skin. Do not leave the ice on more than 10 to 15 minutes at a time. Use for the first 24 to 48 hours after an injury.  If you smoke, try to quit. Broken bones take longer to heal if you smoke.  You may need someone to help you with dressing, bathing, and eating.  Please return here or go to the Emergency Department for any concerns or worsening of condition.  Please follow up with your primary care doctor or specialist as needed.    If you  smoke, please stop smoking.     No

## 2024-08-14 NOTE — ED ADULT NURSE NOTE - DOES PATIENT HAVE ADVANCE DIRECTIVE
[With Me] : with me [___ Year(s)] : in [unfilled] year(s) [FreeTextEntry1] :  Coronary artery disease: Moderate disease of the mid LAD -- no angina; we discussed ischemic symptoms and indications for revascularization.   Hypercholesterolemia: Marked improvement in LDL with rosuvastatin -- now < 55.  Obesity:  I recommended weight loss.  No

## 2024-12-07 NOTE — ED PROCEDURE NOTE - NS ED FORMED SPLINTS 1
Ulnar Gutter Splint
Poor po intake, reports no appetite. Not able to obtain much more hx from pt other than doesn't like cheese/foods with cheese

## 2025-02-19 NOTE — ED ADULT NURSE NOTE - CINV DISCH MEDS REVIEWED YN
No
PAST SURGICAL HISTORY:  H/O kidney transplant 2014 right    H/O total colectomy     Knee effusion, left 1/2014:  drainage of    S/P biopsy 1/2014:  Needle Biopsy of Kidney    S/P colonoscopy     S/P endoscopy     S/P ureteral stent placement s/p multiple exchanges, last one year ago

## 2025-07-30 NOTE — ED ADULT TRIAGE NOTE - CHIEF COMPLAINT QUOTE
7/30/2025      Reilly Borja  [address], [place] [address]        [facility] GERIATRIC SERVICE  Episodic/Acute/Follow-Up  Meghna MRN: [id number]. Place of Service where encounter took place:  [facility] ([facility]) [[id number]]   Chief Complaint   Patient presents with     RECHECK    HPI: Reilly Borja  is a 76 year old (1949), who is being seen today for an episodic care visit.    TCU Course:  7/23: TCU Admission. (FVL, post left hip repair but then fell at home recovering, underlying Charcot of left foot).    7/24: Admit visit. Dc'd Tramadol.    Abdirizak seen today on routine follow up as he continues to rehab in TCU.     Today, Abdirizak is upset and worried, because he is having some substantial pain in his left ankle.  He reports new redness on the lateral aspect, pain that is not relieved with oxycodone, some occasional zingers, and increased edema from baseline.  He does have on and off swelling, but previously did not need as many diuretics he says.  He is also noting some low blood pressures, dizziness, but denies any chest pain or palpitations.  He has occasional shortness of breath with exercise, but denies a new cough or fever.  He reports a good appetite, and denies any nausea or heartburn.  His bowels and bladder are working well.  He would like to pursue amputation of his left foot due to the amount of pain and poor quality of life that he has.    Past Medical and Surgical History reviewed in Epic today.  MEDICATIONS:  Current Outpatient Medications   Medication Sig Dispense Refill     acetaminophen (TYLENOL) 500 MG tablet Take 500-1,000 mg by mouth every 6 hours as needed for mild pain.       aspirin (ASA) 325 MG EC tablet Take 1 tablet (325 mg) by mouth daily. 30 tablet 0     carvedilol (COREG) 12.5 MG tablet Take 12.5 mg by mouth 2 times daily (with meals). Hold for SBP <100 or HR <55       carvedilol (COREG) 25 MG tablet Take 25 mg by mouth  suture removal "daily with food.       DULoxetine (CYMBALTA) 30 MG capsule Take 30 mg by mouth daily.       empagliflozin (JARDIANCE) 25 MG TABS tablet Take 25 mg by mouth every morning       gabapentin (NEURONTIN) 300 MG capsule Take 600 mg by mouth 2 times daily. 2 capsules (600 mg) morning and afternoon. This is in addition to the bedtime dose of 900 mg.       gabapentin (NEURONTIN) 300 MG capsule Take 900 mg by mouth at bedtime. This is in addition to the morning and afternoon doses.       hydrOXYzine HCl (ATARAX) 25 MG tablet Take 1 tablet (25 mg) by mouth every 6 hours as needed for itching or anxiety (with pain, moderate pain). 30 tablet 0     insulin aspart (NOVOLOG PEN) 100 UNIT/ML pen Inject 10 Units subcutaneously 3 times daily (with meals).       insulin glargine (LANTUS VIAL) 100 UNIT/ML vial Inject 25 Units subcutaneously every evening.       insulin glargine (LANTUS VIAL) 100 UNIT/ML vial Inject 30 Units subcutaneously every morning.       metFORMIN (GLUCOPHAGE) 1000 MG tablet Take 1,000 mg by mouth daily (with breakfast).       oxyCODONE (ROXICODONE) 5 MG tablet Take 1-2 tablets (5-10 mg) by mouth every 4 hours as needed for moderate to severe pain. 40 tablet 0     sacubitril-valsartan (ENTRESTO)  MG per tablet Take 1 tablet by mouth daily.       semaglutide (OZEMPIC, 1 MG/DOSE,) 2 MG/1.5ML pen Inject 1 mg subcutaneously every 7 days. On Sunday       senna-docusate (SENOKOT-S/PERICOLACE) 8.6-50 MG tablet Take 1-2 tablets by mouth 2 times daily as needed for constipation. Take while on oral narcotics to prevent or treat constipation. 30 tablet 0     spironolactone (ALDACTONE) 25 MG tablet Take 25 mg by mouth daily.       torsemide (DEMADEX) 20 MG tablet Take 40 mg by mouth daily.       Objective: /63   Pulse 75   Temp 98  F (36.7  C)   Resp 18   Ht 1.88 m (6' 2\")   Wt 112 kg (247 lb)   SpO2 96%   BMI 31.71 kg/m    BG Trend:    BP Trend:    Exam:  GENERAL APPEARANCE: Alert, in no distress, " cooperative.   RESP: Respiratory effort good, no respiratory distress, Lung sounds clear. On RA.   CV: Auscultation of heart reveals S1, S2, rate and rhythm regular, no murmur, no rub or gallop, Edema 4+ LLE. Peripheral pulses are 2+.  Skin: LLE:    PSYCH: Insight, judgement, and memory are baseline, affect and mood are happy/engaged.    ASSESSMENT/PLAN:  Localized swelling of left foot  Erythema of foot  Left foot pain  Charcot's joint of left foot  Hypotension, unspecified hypotension type  Essential hypertension  Closed fracture of neck of left femur with routine healing, subsequent encounter  Status post total replacement of left hip  Physical deconditioning  Chronic heart failure with preserved ejection fraction (H)  Type 2 diabetes mellitus with complication, with long-term current use of insulin (H)  Acute on chronic. Tenuous.   Provider reviewed records from facility, and interpreted most recent imaging/lab work (LLE US, prior Left foot CT scan, CBC, BMP), and vital signs, each with their own characteristics requiring MDM.  Provider discussed care with nursing, , rehab team, SO/partner Madison, podiatry, and PharmD:  Rehab team reports Bill is walking and WBAT, sometimes struggling to participate secondary to pain.  They are also noting a CPT of 4.8/5.6 and SLUMS of 17/30.   Acting  agent reports patient's next of kin is his partner Madison though they are not  they do reside together.  Given his cognitive impairments, I would be important for and to know that he needs at least daily monitoring if these do not improve with rehab.  Nursing team reports patient has talked about leaving AMA, but they are also noting concerns with hypotension, low blood sugars, new foot redness as described, and they also are requesting an oxycodone refill.  Partner Madison at bedside, and provides some historical context around many surgeries and gave provider direct contact information for ankle  surgeon.  Provider [facility] [place], and left a detailed, de-identified message with direct callback number.  Further discussion with Pharm.D. as noted below.  Charcot + redness/swelling/pain exacerbated:  Noting ultrasound from 7/22 which was clear for any abnormality.  Noting recent course of Keflex.  Given complexity and potential for atypical bug, potential for cellulitis recurrence, provider coordinated care with pharmacist to select agent that would have MDRO coverage.  Noting concern around starting Bactrim with concurrent use of spironolactone and Entresto.  Start Bactrim until ultrasound results help solidify plan.  Closely monitor K level.  Treat pain by scheduling Tylenol and renew Vistaril.  Continuation of PRN order for non-antipsychotic psychotropic medication, is appropriate due to pain and is being reordered today with an end date in 14 days.   Oxycodone refilled.  As mentioned, provider personally reached out to Dr. Marcin Parkinson's office, given there is an osteomyelitis history in the left foot, with recent instrumentation to the left hip due to fracture and new symptoms as above.  Until we hear back, we will continue with current plan.  Obtain venous ultrasound to rule out DVT.  Patient is on prophylactic aspirin post left hip.  Obtain CRP/ESR.  CHF, HTN/Hypotension:  Noting hypotension and occasional dizziness.  Nursing has had to hold diuretics and beta-blockers on and off which can be seen reflected in weights trend.  Decrease Coreg as noted below, and would want to keep diuretics with significant edema.  We note the risk that Jardiance carries with increasing chance of lower limb amputation.  Query cardiology if this should continue.  DMII with fluctuating BG readings:  Mitigate short acting insulin need by increasing Lantus.  Short acting insulin has been held several times, and patient has still been low, reduce scheduled short acting  insulin.  Due to complexity and complaints, provider did discuss potential for hospitalization with this patient.  He would like to see what podiatry says and start evaluation/treatment in-house at this time.  Follow up w/ results, w/in 1 week, or as needed.    Orders:  LLE Venous Ultrasound x1 asap. Dx: LLE swelling.   ESR, CRP x1 on 8/1. Dx: LLE swelling, cellulitis, concern for OM.  RENEW PRN Vistaril x 14 days. Dx: pain.  Tylenol 1000mg PO TID. Dx: pain.  Change PRN Tylenol to 650mg PO Qday PRN. Dx: pain/fever.   Decrease insulin aspart to 6 units subcutaneous TID w/ meals. Dx: DM II.  Increase Lantus to 28 units subcutaneous Qday. Dx: DM II.    Decrease Coreg to 6.25mg PO BID. Dx: CHF/hypotension.   Bactrim DS, 1 tab PO BID x 7 days. Dx: LLE cellulitis.   K level x1 on 8/1 AND 8/4. Dx: risk for hyperkalemia.   Oxycodone refilled.      Electronically signed by:  Dr. Sharda Bautista, APRN CNP DNP      Sincerely,        ANGELO Dumont CNP    Electronically signed